# Patient Record
Sex: FEMALE | Race: WHITE | Employment: UNEMPLOYED | URBAN - METROPOLITAN AREA
[De-identification: names, ages, dates, MRNs, and addresses within clinical notes are randomized per-mention and may not be internally consistent; named-entity substitution may affect disease eponyms.]

---

## 2018-06-08 ENCOUNTER — HOSPITAL ENCOUNTER (EMERGENCY)
Age: 48
Discharge: HOME | End: 2018-06-08
Attending: EMERGENCY MEDICINE
Payer: MEDICAID

## 2018-06-08 ENCOUNTER — APPOINTMENT (OUTPATIENT)
Dept: CT IMAGING | Age: 48
End: 2018-06-08
Payer: MEDICAID

## 2018-06-08 VITALS
DIASTOLIC BLOOD PRESSURE: 78 MMHG | BODY MASS INDEX: 30.24 KG/M2 | SYSTOLIC BLOOD PRESSURE: 114 MMHG | HEART RATE: 76 BPM | TEMPERATURE: 97.3 F | WEIGHT: 150 LBS | RESPIRATION RATE: 18 BRPM | HEIGHT: 59 IN | OXYGEN SATURATION: 98 %

## 2018-06-08 DIAGNOSIS — A59.01 TRICHOMONAL VAGINITIS: Primary | ICD-10-CM

## 2018-06-08 DIAGNOSIS — R10.31 RIGHT LOWER QUADRANT ABDOMINAL PAIN: ICD-10-CM

## 2018-06-08 LAB
-: ABNORMAL
ALBUMIN SERPL-MCNC: 4.2 G/DL (ref 3.5–5.2)
ALBUMIN/GLOBULIN RATIO: 1.3 (ref 1–2.5)
ALP BLD-CCNC: 56 U/L (ref 35–104)
ALT SERPL-CCNC: 17 U/L (ref 5–33)
AMORPHOUS: ABNORMAL
ANION GAP SERPL CALCULATED.3IONS-SCNC: 12 MMOL/L (ref 9–17)
AST SERPL-CCNC: 14 U/L
BACTERIA: ABNORMAL
BILIRUB SERPL-MCNC: 0.33 MG/DL (ref 0.3–1.2)
BILIRUBIN DIRECT: 0.09 MG/DL
BILIRUBIN URINE: NEGATIVE
BILIRUBIN, INDIRECT: 0.24 MG/DL (ref 0–1)
BUN BLDV-MCNC: 13 MG/DL (ref 6–20)
BUN/CREAT BLD: NORMAL (ref 9–20)
CALCIUM SERPL-MCNC: 9.5 MG/DL (ref 8.6–10.4)
CASTS UA: ABNORMAL /LPF (ref 0–2)
CHLORIDE BLD-SCNC: 104 MMOL/L (ref 98–107)
CO2: 24 MMOL/L (ref 20–31)
COLOR: YELLOW
CREAT SERPL-MCNC: 0.75 MG/DL (ref 0.5–0.9)
CRYSTALS, UA: ABNORMAL /HPF
DIRECT EXAM: ABNORMAL
EPITHELIAL CELLS UA: ABNORMAL /HPF (ref 0–5)
GFR AFRICAN AMERICAN: >60 ML/MIN
GFR NON-AFRICAN AMERICAN: >60 ML/MIN
GFR SERPL CREATININE-BSD FRML MDRD: NORMAL ML/MIN/{1.73_M2}
GFR SERPL CREATININE-BSD FRML MDRD: NORMAL ML/MIN/{1.73_M2}
GLOBULIN: NORMAL G/DL (ref 1.5–3.8)
GLUCOSE BLD-MCNC: 92 MG/DL (ref 70–99)
GLUCOSE URINE: NEGATIVE
HCG QUALITATIVE: NEGATIVE
HCT VFR BLD CALC: 46.2 % (ref 36.3–47.1)
HEMOGLOBIN: 15.2 G/DL (ref 11.9–15.1)
KETONES, URINE: NEGATIVE
LEUKOCYTE ESTERASE, URINE: ABNORMAL
LIPASE: 20 U/L (ref 13–60)
Lab: ABNORMAL
MCH RBC QN AUTO: 29.1 PG (ref 25.2–33.5)
MCHC RBC AUTO-ENTMCNC: 32.9 G/DL (ref 28.4–34.8)
MCV RBC AUTO: 88.5 FL (ref 82.6–102.9)
MUCUS: ABNORMAL
NITRITE, URINE: NEGATIVE
NRBC AUTOMATED: 0 PER 100 WBC
OTHER OBSERVATIONS UA: ABNORMAL
PDW BLD-RTO: 12 % (ref 11.8–14.4)
PH UA: 6 (ref 5–8)
PLATELET # BLD: 300 K/UL (ref 138–453)
PMV BLD AUTO: 9.3 FL (ref 8.1–13.5)
POTASSIUM SERPL-SCNC: 4.5 MMOL/L (ref 3.7–5.3)
PROTEIN UA: NEGATIVE
RBC # BLD: 5.22 M/UL (ref 3.95–5.11)
RBC UA: ABNORMAL /HPF (ref 0–2)
RENAL EPITHELIAL, UA: ABNORMAL /HPF
SODIUM BLD-SCNC: 140 MMOL/L (ref 135–144)
SPECIFIC GRAVITY UA: 1.02 (ref 1–1.03)
SPECIMEN DESCRIPTION: ABNORMAL
STATUS: ABNORMAL
TOTAL PROTEIN: 7.4 G/DL (ref 6.4–8.3)
TRICHOMONAS: ABNORMAL
TURBIDITY: CLEAR
URINE HGB: ABNORMAL
UROBILINOGEN, URINE: NORMAL
WBC # BLD: 8 K/UL (ref 3.5–11.3)
WBC UA: ABNORMAL /HPF (ref 0–5)
YEAST: ABNORMAL

## 2018-06-08 PROCEDURE — 81001 URINALYSIS AUTO W/SCOPE: CPT

## 2018-06-08 PROCEDURE — 83690 ASSAY OF LIPASE: CPT

## 2018-06-08 PROCEDURE — 96376 TX/PRO/DX INJ SAME DRUG ADON: CPT

## 2018-06-08 PROCEDURE — 6360000002 HC RX W HCPCS: Performed by: EMERGENCY MEDICINE

## 2018-06-08 PROCEDURE — 87480 CANDIDA DNA DIR PROBE: CPT

## 2018-06-08 PROCEDURE — 96372 THER/PROPH/DIAG INJ SC/IM: CPT

## 2018-06-08 PROCEDURE — 84703 CHORIONIC GONADOTROPIN ASSAY: CPT

## 2018-06-08 PROCEDURE — 6360000004 HC RX CONTRAST MEDICATION: Performed by: EMERGENCY MEDICINE

## 2018-06-08 PROCEDURE — 2580000003 HC RX 258: Performed by: EMERGENCY MEDICINE

## 2018-06-08 PROCEDURE — 74177 CT ABD & PELVIS W/CONTRAST: CPT

## 2018-06-08 PROCEDURE — 87510 GARDNER VAG DNA DIR PROBE: CPT

## 2018-06-08 PROCEDURE — 80076 HEPATIC FUNCTION PANEL: CPT

## 2018-06-08 PROCEDURE — 96375 TX/PRO/DX INJ NEW DRUG ADDON: CPT

## 2018-06-08 PROCEDURE — 80048 BASIC METABOLIC PNL TOTAL CA: CPT

## 2018-06-08 PROCEDURE — 87660 TRICHOMONAS VAGIN DIR PROBE: CPT

## 2018-06-08 PROCEDURE — G0383 LEV 4 HOSP TYPE B ED VISIT: HCPCS

## 2018-06-08 PROCEDURE — 6370000000 HC RX 637 (ALT 250 FOR IP): Performed by: EMERGENCY MEDICINE

## 2018-06-08 PROCEDURE — 87491 CHLMYD TRACH DNA AMP PROBE: CPT

## 2018-06-08 PROCEDURE — 85027 COMPLETE CBC AUTOMATED: CPT

## 2018-06-08 PROCEDURE — 96374 THER/PROPH/DIAG INJ IV PUSH: CPT

## 2018-06-08 PROCEDURE — 87086 URINE CULTURE/COLONY COUNT: CPT

## 2018-06-08 PROCEDURE — 87591 N.GONORRHOEAE DNA AMP PROB: CPT

## 2018-06-08 RX ORDER — 0.9 % SODIUM CHLORIDE 0.9 %
1000 INTRAVENOUS SOLUTION INTRAVENOUS ONCE
Status: COMPLETED | OUTPATIENT
Start: 2018-06-08 | End: 2018-06-08

## 2018-06-08 RX ORDER — FENTANYL CITRATE 50 UG/ML
25 INJECTION, SOLUTION INTRAMUSCULAR; INTRAVENOUS ONCE
Status: COMPLETED | OUTPATIENT
Start: 2018-06-08 | End: 2018-06-08

## 2018-06-08 RX ORDER — METRONIDAZOLE 500 MG/1
2000 TABLET ORAL ONCE
Status: COMPLETED | OUTPATIENT
Start: 2018-06-08 | End: 2018-06-08

## 2018-06-08 RX ORDER — AZITHROMYCIN 250 MG/1
1000 TABLET, FILM COATED ORAL ONCE
Status: COMPLETED | OUTPATIENT
Start: 2018-06-08 | End: 2018-06-08

## 2018-06-08 RX ORDER — CEFTRIAXONE SODIUM 250 MG/1
250 INJECTION, POWDER, FOR SOLUTION INTRAMUSCULAR; INTRAVENOUS ONCE
Status: COMPLETED | OUTPATIENT
Start: 2018-06-08 | End: 2018-06-08

## 2018-06-08 RX ORDER — ONDANSETRON 2 MG/ML
4 INJECTION INTRAMUSCULAR; INTRAVENOUS ONCE
Status: COMPLETED | OUTPATIENT
Start: 2018-06-08 | End: 2018-06-08

## 2018-06-08 RX ADMIN — FENTANYL CITRATE 25 MCG: 50 INJECTION INTRAMUSCULAR; INTRAVENOUS at 13:07

## 2018-06-08 RX ADMIN — FENTANYL CITRATE 50 MCG: 50 INJECTION INTRAMUSCULAR; INTRAVENOUS at 10:36

## 2018-06-08 RX ADMIN — CEFTRIAXONE SODIUM 250 MG: 250 INJECTION, POWDER, FOR SOLUTION INTRAMUSCULAR; INTRAVENOUS at 13:06

## 2018-06-08 RX ADMIN — SODIUM CHLORIDE 1000 ML: 9 INJECTION, SOLUTION INTRAVENOUS at 10:36

## 2018-06-08 RX ADMIN — IOPAMIDOL 75 ML: 755 INJECTION, SOLUTION INTRAVENOUS at 12:38

## 2018-06-08 RX ADMIN — AZITHROMYCIN 1000 MG: 250 TABLET, FILM COATED ORAL at 13:06

## 2018-06-08 RX ADMIN — ONDANSETRON 4 MG: 2 INJECTION INTRAMUSCULAR; INTRAVENOUS at 10:36

## 2018-06-08 RX ADMIN — METRONIDAZOLE 2000 MG: 500 TABLET ORAL at 13:06

## 2018-06-08 ASSESSMENT — ENCOUNTER SYMPTOMS
BLOOD IN STOOL: 0
ABDOMINAL DISTENTION: 0
DIARRHEA: 0
SHORTNESS OF BREATH: 0
CONSTIPATION: 0
VOMITING: 0
NAUSEA: 1
ANAL BLEEDING: 0
ABDOMINAL PAIN: 1
BACK PAIN: 0

## 2018-06-08 ASSESSMENT — PAIN SCALES - GENERAL
PAINLEVEL_OUTOF10: 8
PAINLEVEL_OUTOF10: 9

## 2018-06-09 LAB
CULTURE: NORMAL
CULTURE: NORMAL
Lab: NORMAL
SPECIMEN DESCRIPTION: NORMAL
STATUS: NORMAL

## 2018-06-11 LAB
C TRACH DNA GENITAL QL NAA+PROBE: NEGATIVE
N. GONORRHOEAE DNA: NEGATIVE

## 2019-03-07 ENCOUNTER — HOSPITAL ENCOUNTER (EMERGENCY)
Age: 49
Discharge: HOME | End: 2019-03-07
Attending: EMERGENCY MEDICINE
Payer: COMMERCIAL

## 2019-03-07 VITALS
RESPIRATION RATE: 19 BRPM | HEART RATE: 94 BPM | TEMPERATURE: 97.2 F | SYSTOLIC BLOOD PRESSURE: 136 MMHG | DIASTOLIC BLOOD PRESSURE: 84 MMHG | OXYGEN SATURATION: 99 %

## 2019-03-07 DIAGNOSIS — M54.9 MUSCULOSKELETAL BACK PAIN: Primary | ICD-10-CM

## 2019-03-07 PROCEDURE — 99283 EMERGENCY DEPT VISIT LOW MDM: CPT

## 2019-03-07 PROCEDURE — 6370000000 HC RX 637 (ALT 250 FOR IP): Performed by: INTERNAL MEDICINE

## 2019-03-07 RX ORDER — CYCLOBENZAPRINE HCL 10 MG
5 TABLET ORAL ONCE
Status: COMPLETED | OUTPATIENT
Start: 2019-03-07 | End: 2019-03-07

## 2019-03-07 RX ORDER — CYCLOBENZAPRINE HCL 5 MG
5 TABLET ORAL NIGHTLY PRN
Qty: 10 TABLET | Refills: 0 | Status: SHIPPED | OUTPATIENT
Start: 2019-03-07 | End: 2019-03-17

## 2019-03-07 RX ADMIN — CYCLOBENZAPRINE HYDROCHLORIDE 5 MG: 10 TABLET, FILM COATED ORAL at 12:31

## 2019-03-07 ASSESSMENT — ENCOUNTER SYMPTOMS
DIARRHEA: 0
WHEEZING: 0
VOMITING: 0
VOICE CHANGE: 0
SORE THROAT: 0
ABDOMINAL PAIN: 0
SHORTNESS OF BREATH: 0
BACK PAIN: 1
NAUSEA: 0
CHEST TIGHTNESS: 0
COUGH: 1
CONSTIPATION: 0

## 2019-03-07 ASSESSMENT — PAIN DESCRIPTION - LOCATION: LOCATION: BACK

## 2019-03-07 ASSESSMENT — PAIN DESCRIPTION - DESCRIPTORS: DESCRIPTORS: SHOOTING

## 2019-03-07 ASSESSMENT — PAIN SCALES - GENERAL: PAINLEVEL_OUTOF10: 8

## 2019-03-07 ASSESSMENT — PAIN DESCRIPTION - PAIN TYPE: TYPE: ACUTE PAIN

## 2019-03-19 ENCOUNTER — APPOINTMENT (OUTPATIENT)
Dept: MRI IMAGING | Age: 49
End: 2019-03-19
Payer: COMMERCIAL

## 2019-03-19 ENCOUNTER — HOSPITAL ENCOUNTER (OUTPATIENT)
Age: 49
Setting detail: OBSERVATION
Discharge: HOME | End: 2019-03-20
Attending: EMERGENCY MEDICINE | Admitting: EMERGENCY MEDICINE
Payer: COMMERCIAL

## 2019-03-19 DIAGNOSIS — M48.02 CERVICAL SPINAL STENOSIS: Primary | ICD-10-CM

## 2019-03-19 DIAGNOSIS — R29.898 WEAKNESS OF LEFT FOOT: ICD-10-CM

## 2019-03-19 PROBLEM — M48.00 SPINAL STENOSIS: Status: ACTIVE | Noted: 2019-03-19

## 2019-03-19 PROCEDURE — 6360000004 HC RX CONTRAST MEDICATION: Performed by: STUDENT IN AN ORGANIZED HEALTH CARE EDUCATION/TRAINING PROGRAM

## 2019-03-19 PROCEDURE — 72157 MRI CHEST SPINE W/O & W/DYE: CPT

## 2019-03-19 PROCEDURE — 6370000000 HC RX 637 (ALT 250 FOR IP): Performed by: STUDENT IN AN ORGANIZED HEALTH CARE EDUCATION/TRAINING PROGRAM

## 2019-03-19 PROCEDURE — 72158 MRI LUMBAR SPINE W/O & W/DYE: CPT

## 2019-03-19 PROCEDURE — 99285 EMERGENCY DEPT VISIT HI MDM: CPT

## 2019-03-19 PROCEDURE — 96372 THER/PROPH/DIAG INJ SC/IM: CPT

## 2019-03-19 PROCEDURE — 72141 MRI NECK SPINE W/O DYE: CPT

## 2019-03-19 PROCEDURE — 6360000002 HC RX W HCPCS: Performed by: EMERGENCY MEDICINE

## 2019-03-19 PROCEDURE — G0378 HOSPITAL OBSERVATION PER HR: HCPCS

## 2019-03-19 PROCEDURE — A9576 INJ PROHANCE MULTIPACK: HCPCS | Performed by: STUDENT IN AN ORGANIZED HEALTH CARE EDUCATION/TRAINING PROGRAM

## 2019-03-19 RX ORDER — OXYCODONE HYDROCHLORIDE AND ACETAMINOPHEN 5; 325 MG/1; MG/1
1 TABLET ORAL ONCE
Status: COMPLETED | OUTPATIENT
Start: 2019-03-19 | End: 2019-03-19

## 2019-03-19 RX ORDER — SODIUM CHLORIDE 0.9 % (FLUSH) 0.9 %
10 SYRINGE (ML) INJECTION 2 TIMES DAILY
Status: DISCONTINUED | OUTPATIENT
Start: 2019-03-19 | End: 2019-03-20 | Stop reason: HOSPADM

## 2019-03-19 RX ORDER — ACETAMINOPHEN 500 MG
1000 TABLET ORAL EVERY 6 HOURS PRN
Status: DISCONTINUED | OUTPATIENT
Start: 2019-03-19 | End: 2019-03-20 | Stop reason: HOSPADM

## 2019-03-19 RX ORDER — CYCLOBENZAPRINE HCL 10 MG
10 TABLET ORAL 3 TIMES DAILY PRN
COMMUNITY
End: 2019-03-25 | Stop reason: ALTCHOICE

## 2019-03-19 RX ORDER — MORPHINE SULFATE 4 MG/ML
4 INJECTION, SOLUTION INTRAMUSCULAR; INTRAVENOUS ONCE
Status: COMPLETED | OUTPATIENT
Start: 2019-03-19 | End: 2019-03-19

## 2019-03-19 RX ADMIN — ACETAMINOPHEN 1000 MG: 500 TABLET ORAL at 21:44

## 2019-03-19 RX ADMIN — MORPHINE SULFATE 4 MG: 4 INJECTION INTRAVENOUS at 14:06

## 2019-03-19 RX ADMIN — GADOTERIDOL 14 ML: 279.3 INJECTION, SOLUTION INTRAVENOUS at 19:58

## 2019-03-19 RX ADMIN — OXYCODONE HYDROCHLORIDE AND ACETAMINOPHEN 1 TABLET: 5; 325 TABLET ORAL at 17:02

## 2019-03-19 ASSESSMENT — PAIN DESCRIPTION - PROGRESSION: CLINICAL_PROGRESSION: GRADUALLY IMPROVING

## 2019-03-19 ASSESSMENT — PAIN DESCRIPTION - PAIN TYPE: TYPE: ACUTE PAIN

## 2019-03-19 ASSESSMENT — PAIN DESCRIPTION - DESCRIPTORS: DESCRIPTORS: CONSTANT

## 2019-03-19 ASSESSMENT — PAIN SCALES - GENERAL
PAINLEVEL_OUTOF10: 5
PAINLEVEL_OUTOF10: 8
PAINLEVEL_OUTOF10: 8
PAINLEVEL_OUTOF10: 9

## 2019-03-19 ASSESSMENT — PAIN DESCRIPTION - ORIENTATION: ORIENTATION: LEFT;UPPER

## 2019-03-20 VITALS
DIASTOLIC BLOOD PRESSURE: 59 MMHG | TEMPERATURE: 98.1 F | BODY MASS INDEX: 23.39 KG/M2 | HEIGHT: 59 IN | WEIGHT: 116 LBS | HEART RATE: 75 BPM | SYSTOLIC BLOOD PRESSURE: 96 MMHG | RESPIRATION RATE: 18 BRPM | OXYGEN SATURATION: 95 %

## 2019-03-20 PROBLEM — M48.02 CERVICAL SPINAL STENOSIS: Status: ACTIVE | Noted: 2019-03-19

## 2019-03-20 PROCEDURE — APPSS45 APP SPLIT SHARED TIME 31-45 MINUTES: Performed by: NURSE PRACTITIONER

## 2019-03-20 PROCEDURE — 6370000000 HC RX 637 (ALT 250 FOR IP): Performed by: STUDENT IN AN ORGANIZED HEALTH CARE EDUCATION/TRAINING PROGRAM

## 2019-03-20 PROCEDURE — 96372 THER/PROPH/DIAG INJ SC/IM: CPT

## 2019-03-20 PROCEDURE — 6370000000 HC RX 637 (ALT 250 FOR IP): Performed by: EMERGENCY MEDICINE

## 2019-03-20 PROCEDURE — 96374 THER/PROPH/DIAG INJ IV PUSH: CPT

## 2019-03-20 PROCEDURE — 2580000003 HC RX 258: Performed by: EMERGENCY MEDICINE

## 2019-03-20 PROCEDURE — G0378 HOSPITAL OBSERVATION PER HR: HCPCS

## 2019-03-20 PROCEDURE — 6360000002 HC RX W HCPCS: Performed by: PSYCHIATRY & NEUROLOGY

## 2019-03-20 PROCEDURE — 99219 PR INITIAL OBSERVATION CARE/DAY 50 MINUTES: CPT | Performed by: PSYCHIATRY & NEUROLOGY

## 2019-03-20 PROCEDURE — 6360000002 HC RX W HCPCS: Performed by: EMERGENCY MEDICINE

## 2019-03-20 RX ORDER — CYCLOBENZAPRINE HCL 10 MG
10 TABLET ORAL 3 TIMES DAILY PRN
Status: DISCONTINUED | OUTPATIENT
Start: 2019-03-20 | End: 2019-03-20 | Stop reason: HOSPADM

## 2019-03-20 RX ORDER — METHYLPREDNISOLONE SODIUM SUCCINATE 40 MG/ML
40 INJECTION, POWDER, LYOPHILIZED, FOR SOLUTION INTRAMUSCULAR; INTRAVENOUS DAILY
Status: COMPLETED | OUTPATIENT
Start: 2019-03-20 | End: 2019-03-20

## 2019-03-20 RX ORDER — SODIUM CHLORIDE 9 MG/ML
INJECTION, SOLUTION INTRAVENOUS CONTINUOUS
Status: DISCONTINUED | OUTPATIENT
Start: 2019-03-20 | End: 2019-03-20 | Stop reason: HOSPADM

## 2019-03-20 RX ORDER — METHYLPREDNISOLONE 4 MG/1
TABLET ORAL
Qty: 1 KIT | Refills: 0 | Status: SHIPPED | OUTPATIENT
Start: 2019-03-20 | End: 2019-03-26

## 2019-03-20 RX ORDER — SODIUM CHLORIDE 0.9 % (FLUSH) 0.9 %
10 SYRINGE (ML) INJECTION EVERY 12 HOURS SCHEDULED
Status: DISCONTINUED | OUTPATIENT
Start: 2019-03-20 | End: 2019-03-20 | Stop reason: HOSPADM

## 2019-03-20 RX ORDER — SODIUM CHLORIDE 0.9 % (FLUSH) 0.9 %
10 SYRINGE (ML) INJECTION PRN
Status: DISCONTINUED | OUTPATIENT
Start: 2019-03-20 | End: 2019-03-20 | Stop reason: HOSPADM

## 2019-03-20 RX ORDER — ACETAMINOPHEN 325 MG/1
650 TABLET ORAL EVERY 4 HOURS PRN
Status: DISCONTINUED | OUTPATIENT
Start: 2019-03-20 | End: 2019-03-20 | Stop reason: SDUPTHER

## 2019-03-20 RX ADMIN — CYCLOBENZAPRINE 10 MG: 10 TABLET, FILM COATED ORAL at 08:00

## 2019-03-20 RX ADMIN — ENOXAPARIN SODIUM 40 MG: 40 INJECTION SUBCUTANEOUS at 08:00

## 2019-03-20 RX ADMIN — METHYLPREDNISOLONE SODIUM SUCCINATE 40 MG: 40 INJECTION, POWDER, FOR SOLUTION INTRAMUSCULAR; INTRAVENOUS at 15:04

## 2019-03-20 RX ADMIN — SODIUM CHLORIDE: 9 INJECTION, SOLUTION INTRAVENOUS at 01:21

## 2019-03-20 RX ADMIN — ACETAMINOPHEN 1000 MG: 500 TABLET ORAL at 08:00

## 2019-03-20 ASSESSMENT — PAIN DESCRIPTION - ORIENTATION: ORIENTATION: MID

## 2019-03-20 ASSESSMENT — PAIN DESCRIPTION - PAIN TYPE: TYPE: ACUTE PAIN

## 2019-03-20 ASSESSMENT — PAIN DESCRIPTION - LOCATION: LOCATION: HEAD;NECK

## 2019-03-20 ASSESSMENT — PAIN SCALES - GENERAL
PAINLEVEL_OUTOF10: 8
PAINLEVEL_OUTOF10: 8

## 2019-03-25 ENCOUNTER — HOSPITAL ENCOUNTER (OUTPATIENT)
Age: 49
Setting detail: SPECIMEN
Discharge: HOME | End: 2019-03-25
Payer: COMMERCIAL

## 2019-03-25 ENCOUNTER — OFFICE VISIT (OUTPATIENT)
Dept: FAMILY MEDICINE CLINIC | Age: 49
End: 2019-03-25
Payer: COMMERCIAL

## 2019-03-25 VITALS
TEMPERATURE: 97.7 F | OXYGEN SATURATION: 98 % | HEIGHT: 59 IN | HEART RATE: 76 BPM | WEIGHT: 151.4 LBS | DIASTOLIC BLOOD PRESSURE: 70 MMHG | BODY MASS INDEX: 30.52 KG/M2 | SYSTOLIC BLOOD PRESSURE: 104 MMHG

## 2019-03-25 DIAGNOSIS — Z13.220 SCREENING FOR HYPERLIPIDEMIA: ICD-10-CM

## 2019-03-25 DIAGNOSIS — Z76.89 ENCOUNTER TO ESTABLISH CARE: ICD-10-CM

## 2019-03-25 DIAGNOSIS — R91.1 PULMONARY NODULE: ICD-10-CM

## 2019-03-25 DIAGNOSIS — F17.210 CIGARETTE NICOTINE DEPENDENCE WITHOUT COMPLICATION: ICD-10-CM

## 2019-03-25 DIAGNOSIS — R20.2 NUMBNESS AND TINGLING IN BOTH HANDS: ICD-10-CM

## 2019-03-25 DIAGNOSIS — Z79.899 MEDICATION MANAGEMENT: ICD-10-CM

## 2019-03-25 DIAGNOSIS — R20.0 NUMBNESS AND TINGLING IN BOTH HANDS: ICD-10-CM

## 2019-03-25 DIAGNOSIS — M54.2 NECK PAIN: Primary | ICD-10-CM

## 2019-03-25 DIAGNOSIS — Z23 NEED FOR PNEUMOCOCCAL VACCINATION: ICD-10-CM

## 2019-03-25 LAB
ALBUMIN SERPL-MCNC: 4.6 G/DL (ref 3.5–5.2)
ALBUMIN/GLOBULIN RATIO: 1.7 (ref 1–2.5)
ALP BLD-CCNC: 52 U/L (ref 35–104)
ALT SERPL-CCNC: 14 U/L (ref 5–33)
ANION GAP SERPL CALCULATED.3IONS-SCNC: 19 MMOL/L (ref 9–17)
AST SERPL-CCNC: 13 U/L
BILIRUB SERPL-MCNC: 0.43 MG/DL (ref 0.3–1.2)
BUN BLDV-MCNC: 19 MG/DL (ref 6–20)
BUN/CREAT BLD: ABNORMAL (ref 9–20)
CALCIUM SERPL-MCNC: 9.1 MG/DL (ref 8.6–10.4)
CHLORIDE BLD-SCNC: 104 MMOL/L (ref 98–107)
CHOLESTEROL/HDL RATIO: 4.2
CHOLESTEROL: 180 MG/DL
CO2: 21 MMOL/L (ref 20–31)
CREAT SERPL-MCNC: 0.74 MG/DL (ref 0.5–0.9)
GFR AFRICAN AMERICAN: >60 ML/MIN
GFR NON-AFRICAN AMERICAN: >60 ML/MIN
GFR SERPL CREATININE-BSD FRML MDRD: ABNORMAL ML/MIN/{1.73_M2}
GFR SERPL CREATININE-BSD FRML MDRD: ABNORMAL ML/MIN/{1.73_M2}
GLUCOSE BLD-MCNC: 83 MG/DL (ref 70–99)
HCT VFR BLD CALC: 45.5 % (ref 36.3–47.1)
HDLC SERPL-MCNC: 43 MG/DL
HEMOGLOBIN: 15.4 G/DL (ref 11.9–15.1)
LDL CHOLESTEROL: 110 MG/DL (ref 0–130)
MCH RBC QN AUTO: 30.2 PG (ref 25.2–33.5)
MCHC RBC AUTO-ENTMCNC: 33.8 G/DL (ref 28.4–34.8)
MCV RBC AUTO: 89.2 FL (ref 82.6–102.9)
NRBC AUTOMATED: 0 PER 100 WBC
PDW BLD-RTO: 11.7 % (ref 11.8–14.4)
PLATELET # BLD: 316 K/UL (ref 138–453)
PMV BLD AUTO: 9.8 FL (ref 8.1–13.5)
POTASSIUM SERPL-SCNC: 4.2 MMOL/L (ref 3.7–5.3)
RBC # BLD: 5.1 M/UL (ref 3.95–5.11)
SODIUM BLD-SCNC: 144 MMOL/L (ref 135–144)
TOTAL PROTEIN: 7.3 G/DL (ref 6.4–8.3)
TRIGL SERPL-MCNC: 135 MG/DL
VLDLC SERPL CALC-MCNC: NORMAL MG/DL (ref 1–30)
WBC # BLD: 12 K/UL (ref 3.5–11.3)

## 2019-03-25 PROCEDURE — 4004F PT TOBACCO SCREEN RCVD TLK: CPT | Performed by: NURSE PRACTITIONER

## 2019-03-25 PROCEDURE — 90732 PPSV23 VACC 2 YRS+ SUBQ/IM: CPT | Performed by: NURSE PRACTITIONER

## 2019-03-25 PROCEDURE — G8427 DOCREV CUR MEDS BY ELIG CLIN: HCPCS | Performed by: NURSE PRACTITIONER

## 2019-03-25 PROCEDURE — 90471 IMMUNIZATION ADMIN: CPT | Performed by: NURSE PRACTITIONER

## 2019-03-25 PROCEDURE — G8417 CALC BMI ABV UP PARAM F/U: HCPCS | Performed by: NURSE PRACTITIONER

## 2019-03-25 PROCEDURE — G8484 FLU IMMUNIZE NO ADMIN: HCPCS | Performed by: NURSE PRACTITIONER

## 2019-03-25 PROCEDURE — 99203 OFFICE O/P NEW LOW 30 MIN: CPT | Performed by: NURSE PRACTITIONER

## 2019-03-25 ASSESSMENT — ENCOUNTER SYMPTOMS
COUGH: 0
RESPIRATORY NEGATIVE: 1

## 2021-10-26 ENCOUNTER — HOSPITAL ENCOUNTER (OUTPATIENT)
Age: 51
Setting detail: SPECIMEN
Discharge: HOME | End: 2021-10-26

## 2021-10-27 LAB
CHLAMYDIA BY THIN PREP: NEGATIVE
N. GONORRHOEAE DNA, THIN PREP: NEGATIVE
SPECIMEN DESCRIPTION: NORMAL

## 2021-10-28 LAB
HPV SAMPLE: NORMAL
HPV, GENOTYPE 16: NOT DETECTED
HPV, GENOTYPE 18: NOT DETECTED
HPV, HIGH RISK OTHER: NOT DETECTED
HPV, INTERPRETATION: NORMAL
SPECIMEN DESCRIPTION: NORMAL

## 2021-11-03 LAB — CYTOLOGY REPORT: NORMAL

## 2021-11-23 ENCOUNTER — HOSPITAL ENCOUNTER (OUTPATIENT)
Dept: ULTRASOUND IMAGING | Age: 51
Discharge: HOME | End: 2021-11-23
Payer: MEDICAID

## 2021-11-23 DIAGNOSIS — K42.9 UMBILICAL HERNIA WITHOUT OBSTRUCTION OR GANGRENE: ICD-10-CM

## 2021-11-23 PROCEDURE — 76705 ECHO EXAM OF ABDOMEN: CPT

## 2022-04-20 ENCOUNTER — HOSPITAL ENCOUNTER (OUTPATIENT)
Dept: PHYSICAL THERAPY | Age: 52
Setting detail: THERAPIES SERIES
Discharge: HOME | End: 2022-04-20
Payer: MEDICAID

## 2022-04-20 PROCEDURE — 97163 PT EVAL HIGH COMPLEX 45 MIN: CPT

## 2022-04-20 PROCEDURE — 97110 THERAPEUTIC EXERCISES: CPT

## 2022-04-20 NOTE — PROGRESS NOTES
** PLEASE SIGN, DATE AND TIME CERTIFICATION BELOW AND RETURN TO [facility] OUTPATIENT REHABILITATION (FAX #: [phone]). ATTEST/CO-SIGN IF ACCESSING VIA [employer]. THANK YOU.**    I certify that I have examined the patient below and determined that Physical Medicine and Rehabilitation service is necessary and that I approve the established plan of care for up to 90 days or as specifically noted. Attestation, signature or co-signature of physician indicates approval of certification requirements.    ________________________ ____________ __________  Physician Signature   Date   Time  [address] [facility]  PHYSICAL THERAPY  [x] EVALUATION  [] DAILY NOTE (LAND) [] DAILY NOTE (AQUATIC ) [] PROGRESS NOTE [] DISCHARGE NOTE    [x] [address] [facility]   [] [place] [address]    [] [address] [facility]   [] Criselda Steven    Date: 2022  Patient Name:  Phoebe Mejía  : 1970  MRN: [id number]  CSN: [id number]    Referring Practitioner Radha Bacon MD   Diagnosis Other intervertebral disc degeneration, lumbosacral region [M51.37]  Spondylosis without myelopathy or radiculopathy, lumbosacral region [M47.817]  Other cervical disc degeneration, high cervical region [M50.31]  Other cervical disc degeneration at C4-C5 level [M50.321]  Other cervical disc degeneration at C5-C6 level [M50.322]    Treatment Diagnosis Pain in low back, pain in neck, decreased trunk ROM, decreased cervical ROM, weakness in core, weakness in hips, poor posture, abnormal gait    Date of Evaluation 22    Additional Pertinent History Arthritis, SOB       Functional Outcome Measure Used Modified Oswestry    Functional Outcome Score 1850 (22)       Insurance: Primary: Payor: [employer] /  /  / ,   Secondary:    Authorization Information: INSURANCE THERAPY BENEFIT: No precert until after 21ZE visit.   Visit Limit Based on Precert  AQUATIC THERAPY COVERED: Yes  MODALITIES COVERED:  Yes except for Iontophoresis and Hot/Cold Packs. TELEHEALTH COVERED: Yes   Visit # 1, 1/10 for progress note   Visits Allowed: 30 visits    Recertification Date: 5/65/86   Physician Follow-Up:    Physician Orders:    History of Present Illness: Patient reports that she has had neck and back pain since 2019. Patient reports that she did not have any specific injury. Patient reports that she does have numbness/tingling in her arms that goes into her hands. Patient reports that she does have numbness/tingling in L leg also that goes to her feet/toes. Patient reports that her neck pain radiates in her shoulder and her back pain radiates into her hips. Patient reports pain with walking, standing, bending, carrying. Patient reports that she did have x-ray done recently. SUBJECTIVE: Previous MRI showed: Mild bilateral degenerative neural foraminal stenosis at L4-5. Degenerative disc disease with associated degenerative uncovertebral and facet hypertrophy most pronounced at the C2-3, C3-4, and C4-5 levels. Moderate right foraminal narrowing at C4-5. Patient reports that laying down and massage and heat help her pain. Social/Functional History and Current Status:  Medications and Allergies have been reviewed and are listed on Medical History Questionnaire. Fabi Kerr lives with significant other in a multiple floor home with stairs and a handrail to enter.     Task Previous Current   ADLs  Independent Modified Independent   IADL's Independent Modified Independent   Ambulation Independent Modified Independent   Transfers Independent Modified Independent   Recreation Independent Dependent/Unable walking   Community Integration Independent Independent   Driving Does not drive Does not drive   Work Unemployed  Unemployed       Objective:    GENERAL   Pain 3/10 pain in neck and back; 10/10 pain in neck and back at most in the past week    Palpation Tenderness over suboccipitals, B upper trap, B levator scap, lumbar spinous process, B PSIS, B piriformis L > R. Sensation Light touch intact B UEs, decreased light touch L LE from knee to ankle/feet. Observation    Edema No edema noted    Accessory Motion Normal up and down glides        POSTURE     Comments   Forward Head x    Rounded Shoulders x    Kyphosis     Lordosis     Lateral Shift     Scoliosis         NECK RANGE OF MOTION   Flexion 26   Extension 36   Rotation Right 65   Rotation Left 55   Sidebending Right 29   Sidebending Left 21   Retraction Restricted, pain on R side    Protraction WFL   [facility]  []     UPPER EXTREMITY RANGE OF MOTION    Left Right Comments   Shoulder Flexion      Shoulder Extension      Shoulder Abduction      Shoulder Adduction      Shoulder External Rotation      Shoulder Internal Rotation      Shoulder Range of Motion is [facility]  [x]      Elbow Flexion      Elbow Extension      Elbow Range of Motion is [facility]  [x]     UPPER EXTREMITY STRENGTH    Left Right Comments   Shoulder Flexion 5 5    Shoulder Extension 5 5    Shoulder Abduction 5 5    Shoulder Adduction 5 5    Shoulder External Rotation 5 5    Shoulder Internal Rotation 5 5    []  Shoulder Strength is grossly WFL. Elbow Flexion 5 5    Elbow Extension 5 5    [] Elbow Strength is grossly WFL.  Strength L weaker          SPECIAL TESTS (+/-)    Left Right Comments   Cerv. Compression + +    Cerv. Distraction - - Symptoms the same or worse with distraction    Vertebral Artery - -    Spurling's + +        OBSERVATION   Bed Mobility Mod I    Transfers Mod I    Ambulation Patient ambulates with decreased quirino, decreased step length and height, decreased trunk rotation, no AD.        POSTURE    No Deficit Deficit Comments   Forward Head  x    Rounded Shoulders  x    Kyphosis x     Lordosis x     Lateral Shift x     Scoliosis x     Iliac Crest x     PSIS x     ASIS x     Leg Length Discrp x     Slumped Sitting  x        TRUNK RANGE OF MOTION   Flexion: WFL, pain   Extension: WFL, pain    Lateral Flexion Left: 25% limited   Lateral Flexion Right: 25% limited   Rotation Left: 25% limited   Rotation Right: 25% limited    Trunk Range of Motion is [facility]  []     LOWER EXTREMITY RANGE OF MOTION    Left Right Comments   Hip Flexion knee to chest      Hip Extension      Hip ABDuction      Hip ADDuction      Hip Internal Rotation      Hip External Rotation      Hip Range of Motion is [facility]  [x] except IR and ER ROM      Knee Flexion      Knee Extension      Knee Range of Motion is [facility]  [x]       LOWER EXTREMITY STRENGTH    Left Right Comments   Hip Flexion 4- 4    Hip Abduction 4 4+    Hip Extension 4 4+    Knee Flexion 5 5    Knee Extension 5 5    Ankle DF 5 5    Ankle PF 5 5    LE strength is WFL []      CORE STRENGTH   B SLR TEST:     15 seconds demonstrating decreased core strength       SPECIAL TESTS (+/-)    Left Right Comments   SLR  - -    90/90 Hamstring length 35 deg (+) 28 deg (+)    SKTC + + pain   Slump + -    ERICA + +    FADIR + +    Ela - -    Iron + +    Ely - -          TREATMENT   Precautions:    Pain: 3/10 pain in neck and back     X in shaded column indicates activity completed today   Modalities Parameters/  Location  Notes                     Manual Therapy Time/Technique  Notes                     Exercise/Intervention   Notes   BACK    Patient educated on the following for HEP. Handout provided. Ab brace    x    Piriformis stretch into IR and ER    x    Modified cross body LE stretch    x    HS stretch   x    LTR   x    NECK       Upper trap/levator scap stretch   x    Chin tuck   x    scap retraction    x             Specific Interventions Next Treatment: NECK: upper trap/levator scap/pec stretch, chin tucks, scapular retraction, posture/cervical stability exercises; BACK: core strength, piriformis/HS stretching/hip flexor stretch, hip IR and ER ROM, modalities as needed.      Activity/Treatment Tolerance:  [x]  Patient tolerated treatment well  []  Patient limited by fatigue  []  Patient limited by pain   []  Patient limited by medical complications  []  Other:     Assessment: 46year old female presents with neck and back pain secondary to disc degeneration. Patient has pain in neck, pain in back, decreased trunk ROM, decreased cervical ROM, weakness in core, weakness in hips, gait deviations, tightness throughout LEs and neck and poor posture that limits her ability to perform daily tasks. Patient would benefit from skilled PT to improve pain, ROM, tissue extensibility, strength, gait and posture to allow improved functional mobility. Patient educated on benefit of PT and PT POC with patient in agreement. Body Structures/Functions/Activity Limitations: impaired activity tolerance, impaired ROM, impaired strength, pain, abnormal gait and abnormal posture  Prognosis: good      Goals    Patient Goal: improve pain     Short Term Goals: 4 weeks  1. Patient will report decrease in pain to 5/10 at most to allow ease of ADLs and household tasks. 2. Patient will improve B cervical lateral flexion AROM to 35 degrees to allow decreased pain with household tasks. 3. Patient will improve B cervical rotation AROM to 70 degrees to allow decreased pain with turning head during driving. 4. Patient will improve trunk AROM to [facility] to allow ease of bending and lifting tasks. 5. Patient will improve B hip strength to 5/5 to allow ease of walking and daily tasks. 6. Patient will perform B SLR for 25 seconds to improve core strength needed for ease of standing tasks. 7. Patient will improve 90/90 hamstring length to 20 degrees B to allow decreased pain with standing/walking. Long Term Goals: 8 weeks  1. Patient will improve Modified Oswestry score from 18/50 to 9/50 to allow decrease in disability and improved functional mobility.   2. Patient will be independent with HEP in order to prevent re-injury and improve functional abilities. Patient Education:   [x]  HEP/Education Completed: Plan of Care, Goals, benefit of PT, HEP handout    [employer] Access Code: [email or web address]  []  No new Education completed  []  Reviewed Prior HEP      [x]  Patient verbalized and/or demonstrated understanding of education provided. []  Patient unable to verbalize and/or demonstrate understanding of education provided. Will continue education. []  Barriers to learning:     PLAN:  Treatment Recommendations: Strengthening, Range of Motion, Gait Training, Stair Training, Neuromuscular Re-education, Manual Therapy - Soft Tissue Mobilization, Manual Therapy - Joint Manipulation, Pain Management, Home Exercise Program, Patient Education, Integrative Dry Needling, Aquatics and Modalities    [x]  Plan of care initiated. Plan to see patient 2 times per week for 8 weeks to address the treatment planned outlined above.   []  Continue with current plan of care  []  Modify plan of care as follows:    []  Hold pending physician visit  []  Discharge    Time In 1305   Time Out 1350   Timed Code Minutes: 8 min   Total Treatment Time: 45 min     Electronically Signed by: Ashley Tobar, PT

## 2022-04-25 ENCOUNTER — HOSPITAL ENCOUNTER (OUTPATIENT)
Dept: PHYSICAL THERAPY | Age: 52
Setting detail: THERAPIES SERIES
Discharge: HOME | End: 2022-04-25
Payer: MEDICAID

## 2022-04-25 PROCEDURE — 97110 THERAPEUTIC EXERCISES: CPT

## 2022-04-25 NOTE — PROGRESS NOTES
[place]  PHYSICAL THERAPY  [] EVALUATION  [x] DAILY NOTE (LAND) [] DAILY NOTE (AQUATIC ) [] PROGRESS NOTE [] DISCHARGE NOTE    [x] OUTPATIENT REHABILITATION [facility]   [] [place] [address]    [] [facility]   [] Laura Carrera    Date: 2022  Patient Name:  Jay Jay Carolina  : 1970  MRN: [id number]  CSN: [id number]    Referring Practitioner Shayne Epps MD   Diagnosis Other intervertebral disc degeneration, lumbosacral region [M51.37]  Spondylosis without myelopathy or radiculopathy, lumbosacral region [M47.817]  Other cervical disc degeneration, high cervical region [M50.31]  Other cervical disc degeneration at C4-C5 level [M50.321]  Other cervical disc degeneration at C5-C6 level [M50.322]    Treatment Diagnosis Pain in low back, pain in neck, decreased trunk ROM, decreased cervical ROM, weakness in core, weakness in hips, poor posture, abnormal gait    Date of Evaluation 22    Additional Pertinent History Arthritis, SOB       Functional Outcome Measure Used Modified Oswestry    Functional Outcome Score  (22)       Insurance: Primary: Payor: [employer] /  /  / ,   Secondary:    Authorization Information: INSURANCE THERAPY BENEFIT: No precert until after 74XZ visit. Visit Limit Based on Precert  AQUATIC THERAPY COVERED:   Yes  MODALITIES COVERED:  Yes except for Iontophoresis and Hot/Cold Packs. TELEHEALTH COVERED: Yes   Visit # 2, 2/10 for progress note   Visits Allowed: 30 visits    Recertification Date:    Physician Follow-Up:    Physician Orders:    History of Present Illness: Patient reports that she has had neck and back pain since 2019. Patient reports that she did not have any specific injury. Patient reports that she does have numbness/tingling in her arms that goes into her hands. Patient reports that she does have numbness/tingling in L leg also that goes to her feet/toes.  Patient reports that her neck pain radiates in her shoulder and her back pain radiates into her hips. Patient reports pain with walking, standing, bending, carrying. Patient reports that she did have x-ray done recently. SUBJECTIVE: Patient states she is doing home exercises twice a day and does feel like it is helping. States she is more painful and sore today but she feels like the pain is related to the weather. TREATMENT   Precautions:    Pain: 4/10 Neck (R>L) and Back (L>R)     X in shaded column indicates activity completed today   Modalities Parameters/  Location  Notes                     Manual Therapy Time/Technique  Notes                     Exercise/Intervention   Notes   BACK       Ab brace  10x 5 sec  X    Ab brace with marching 10x  X    Piriformis stretch into IR and ER  3x 20 sec  X    Modified cross body LE stretch  3x 20 sec  X    HS stretch 3x 20 sec  X    LTR 5x 10 sec  X           NECK       Upper trap/levator scap stretch 3x 20 sec  X    Chin tuck 10x 3 sec  X    Scap retraction  10x 5 sec  X    Posterior shoulder rolls 10x  X           Educated on pillow support and sleeping positions   X             Specific Interventions Next Treatment: NECK: upper trap/levator scap/pec stretch, chin tucks, scapular retraction, posture/cervical stability exercises; BACK: core strength, piriformis/HS stretching/hip flexor stretch, hip IR and ER ROM, modalities as needed. Activity/Treatment Tolerance:  [x]  Patient tolerated treatment well []  Patient limited by fatigue  []  Patient limited by pain   []  Patient limited by medical complications  []  Other:     Assessment: Review of HEP exercises today with patient tolerating fairly well. Cued patient for abdominal bracing technique and correct breathing. No change noted in pain levels with pain rated 4/10 at completion of therapy session. Patient needed occasional cues for gentle stretching.      Body Structures/Functions/Activity Limitations: impaired activity tolerance, impaired ROM, impaired strength, pain, abnormal gait and abnormal posture  Prognosis: good      Goals    Patient Goal: improve pain     Short Term Goals: 4 weeks  1. Patient will report decrease in pain to 5/10 at most to allow ease of ADLs and household tasks. 2. Patient will improve B cervical lateral flexion AROM to 35 degrees to allow decreased pain with household tasks. 3. Patient will improve B cervical rotation AROM to 70 degrees to allow decreased pain with turning head during driving. 4. Patient will improve trunk AROM to [facility] to allow ease of bending and lifting tasks. 5. Patient will improve B hip strength to 5/5 to allow ease of walking and daily tasks. 6. Patient will perform B SLR for 25 seconds to improve core strength needed for ease of standing tasks. 7. Patient will improve 90/90 hamstring length to 20 degrees B to allow decreased pain with standing/walking. Long Term Goals: 8 weeks  1. Patient will improve Modified Oswestry score from 18/50 to 9/50 to allow decrease in disability and improved functional mobility. 2. Patient will be independent with HEP in order to prevent re-injury and improve functional abilities. Patient Education:   [x]  HEP/Education Completed: Reviewed HEP, monitor pain, use of heat, Abdominal bracing with daily activities   [employer] Access Code: [email or web address]  []  No new Education completed  []  Reviewed Prior HEP      [x]  Patient verbalized and/or demonstrated understanding of education provided. []  Patient unable to verbalize and/or demonstrate understanding of education provided. Will continue education.   []  Barriers to learning:     PLAN:  Treatment Recommendations: Strengthening, Range of Motion, Gait Training, Stair Training, Neuromuscular Re-education, Manual Therapy - Soft Tissue Mobilization, Manual Therapy - Joint Manipulation, Pain Management, Home Exercise Program, Patient Education, Integrative Dry Needling, Aquatics and Modalities    []  Plan of care initiated. Plan to see patient 2 times per week for 8 weeks to address the treatment planned outlined above.   [x]  Continue with current plan of care  []  Modify plan of care as follows:    []  Hold pending physician visit  []  Discharge    Time In 0915   Time Out 0945   Timed Code Minutes: 30 min   Total Treatment Time: 30 min     Electronically Signed by: Marah Parra PTA

## 2022-04-27 ENCOUNTER — HOSPITAL ENCOUNTER (OUTPATIENT)
Dept: PHYSICAL THERAPY | Age: 52
Setting detail: THERAPIES SERIES
Discharge: HOME | End: 2022-04-27
Payer: MEDICAID

## 2022-04-27 PROCEDURE — 97110 THERAPEUTIC EXERCISES: CPT

## 2022-04-27 NOTE — PROGRESS NOTES
[address] [facility]  PHYSICAL THERAPY  [] EVALUATION  [x] DAILY NOTE (LAND) [] DAILY NOTE (AQUATIC ) [] PROGRESS NOTE [] DISCHARGE NOTE    [x] OUTPATIENT REHABILITATION [facility]   [] [place] [address]    [] [facility]   [] [facility]    Date: 2022  Patient Name:  Lory Tinajero  : 1970  MRN: [id number]  CSN: [id number]    Referring Practitioner Skinny Mcgarry MD   Diagnosis Other intervertebral disc degeneration, lumbosacral region [M51.37]  Spondylosis without myelopathy or radiculopathy, lumbosacral region [M47.817]  Other cervical disc degeneration, high cervical region [M50.31]  Other cervical disc degeneration at C4-C5 level [M50.321]  Other cervical disc degeneration at C5-C6 level [M50.322]    Treatment Diagnosis Pain in low back, pain in neck, decreased trunk ROM, decreased cervical ROM, weakness in core, weakness in hips, poor posture, abnormal gait    Date of Evaluation 22    Additional Pertinent History Arthritis, SOB       Functional Outcome Measure Used Modified Oswestry    Functional Outcome Score 18/50 (22)       Insurance: Primary: Payor: [employer]p /  /  / ,   Secondary:    Authorization Information: INSURANCE THERAPY BENEFIT: No precert until after 69ZH visit. Visit Limit Based on Precert  AQUATIC THERAPY COVERED:   Yes  MODALITIES COVERED:  Yes except for Iontophoresis and Hot/Cold Packs. TELEHEALTH COVERED: Yes   Visit # 3, 3/10 for progress note   Visits Allowed: 30 visits    Recertification Date:    Physician Follow-Up:    Physician Orders:    History of Present Illness: Patient reports that she has had neck and back pain since 2019. Patient reports that she did not have any specific injury. Patient reports that she does have numbness/tingling in her arms that goes into her hands. Patient reports that she does have numbness/tingling in L leg also that goes to her feet/toes.  Patient reports that her neck pain radiates in her shoulder and her back pain radiates into her hips. Patient reports pain with walking, standing, bending, carrying. Patient reports that she did have x-ray done recently. SUBJECTIVE: Patient states she is more sore today due to the exercises. Rates current pain 5/10 in both neck and back. Reports compliance with HEP. TREATMENT   Precautions:    Pain: 5/10 Neck (R>L) and Back (L>R)     X in shaded column indicates activity completed today   Modalities Parameters/  Location  Notes                     Manual Therapy Time/Technique  Notes                     Exercise/Intervention   Notes   BACK       Ab brace  10x 5 sec  X    Ab brace with marching 10x  X    Ab brace with ball and band 10x 5 sec  x Added today   Piriformis stretch into IR and ER  3x 20 sec  X    Modified cross body LE stretch  3x 20 sec  X    HS stretch supine with towel behind knee 3x 20 sec  X    LTR 5x 10 sec  X           NECK       Upper trap/levator scap stretch 3x 20 sec  X    Dive stretch 3x 20 sec  x    Chin tuck 10x 3 sec  X    Scap retraction  10x 5 sec  X    Posterior shoulder rolls 10x  X                                Educated on pillow support and sleeping positions       Educated on postural awareness   x      Specific Interventions Next Treatment: NECK: upper trap/levator scap/pec stretch, chin tucks, scapular retraction, posture/cervical stability exercises; BACK: core strength, piriformis/HS stretching/hip flexor stretch, hip IR and ER ROM, modalities as needed. Activity/Treatment Tolerance:  [x]  Patient tolerated treatment well []  Patient limited by fatigue  []  Patient limited by pain   []  Patient limited by medical complications  []  Other:     Assessment: Patient completed exercises as listed above working on flexibility, core stability and strength. Added ab brace with ball and band and dive stretch. Also discussed postural awareness with patient.  Provided cues for abdominal bracing technique with proper breathing. Will continue to progress as tolerated by patient. Goals    Patient Goal: improve pain     Short Term Goals: 4 weeks  1. Patient will report decrease in pain to 5/10 at most to allow ease of ADLs and household tasks. 2. Patient will improve B cervical lateral flexion AROM to 35 degrees to allow decreased pain with household tasks. 3. Patient will improve B cervical rotation AROM to 70 degrees to allow decreased pain with turning head during driving. 4. Patient will improve trunk AROM to [facility] to allow ease of bending and lifting tasks. 5. Patient will improve B hip strength to 5/5 to allow ease of walking and daily tasks. 6. Patient will perform B SLR for 25 seconds to improve core strength needed for ease of standing tasks. 7. Patient will improve 90/90 hamstring length to 20 degrees B to allow decreased pain with standing/walking. Long Term Goals: 8 weeks  1. Patient will improve Modified Oswestry score from 18/50 to 9/50 to allow decrease in disability and improved functional mobility. 2. Patient will be independent with HEP in order to prevent re-injury and improve functional abilities. Patient Education:    [x]  HEP/Education Completed: Patient given update HO for HEP. [employer] Access Code: [email or web address]  []  No new Education completed  []  Reviewed Prior HEP      [x]  Patient verbalized and/or demonstrated understanding of education provided. []  Patient unable to verbalize and/or demonstrate understanding of education provided. Will continue education. []  Barriers to learning:     PLAN:  Treatment Recommendations: Strengthening, Range of Motion, Gait Training, Stair Training, Neuromuscular Re-education, Manual Therapy - Soft Tissue Mobilization, Manual Therapy - Joint Manipulation, Pain Management, Home Exercise Program, Patient Education, Integrative Dry Needling, Aquatics and Modalities    []  Plan of care initiated.   Plan to see patient 2 times per week for 8 weeks to address the treatment planned outlined above.   [x]  Continue with current plan of care  []  Modify plan of care as follows:    []  Hold pending physician visit  []  Discharge    Time In 1135   Time Out 1215   Timed Code Minutes: 40 min   Total Treatment Time: 40 min     Electronically Signed by: Caroline Dueñas, PTA

## 2022-05-02 ENCOUNTER — HOSPITAL ENCOUNTER (OUTPATIENT)
Dept: PHYSICAL THERAPY | Age: 52
Setting detail: THERAPIES SERIES
Discharge: HOME | End: 2022-05-02
Payer: MEDICAID

## 2022-05-02 PROCEDURE — 97110 THERAPEUTIC EXERCISES: CPT

## 2022-05-02 NOTE — PROGRESS NOTES
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.817]  Other cervical disc degeneration, high cervical region [M50.31]  Other cervical disc degeneration at C4-C5 level [M50.321]  Other cervical disc degeneration at C5-C6 level [M50.322]    Treatment Diagnosis Pain in low back, pain in neck, decreased trunk ROM, decreased cervical ROM, weakness in core, weakness in hips, poor posture, abnormal gait    Date of Evaluation 22    Additional Pertinent History Arthritis, SOB       Functional Outcome Measure Used Modified Oswestry    Functional Outcome Score 18/50 (22)       Insurance: Primary: Payor: Mary Grace Rodríguez /  /  / ,   Secondary:    Authorization Information: INSURANCE THERAPY BENEFIT: No precert until after 30FX visit. Visit Limit Based on Precert  AQUATIC THERAPY COVERED:   Yes  MODALITIES COVERED:  Yes except for Iontophoresis and Hot/Cold Packs. TELEHEALTH COVERED: Yes   Visit # 4, 4/10 for progress note   Visits Allowed: 30 visits    Recertification Date:    Physician Follow-Up:    Physician Orders:    History of Present Illness: Patient reports that she has had neck and back pain since 2019. Patient reports that she did not have any specific injury. Patient reports that she does have numbness/tingling in her arms that goes into her hands. Patient reports that she does have numbness/tingling in L leg also that goes to her feet/toes.  Patient reports that her neck pain radiates in her shoulder and her back pain radiates into her hips. Patient reports pain with walking, standing, bending, carrying. Patient reports that she did have x-ray done recently. SUBJECTIVE: Patient reports that she was getting headaches and she had to stop doing the exercises for her neck for a couple days. Patient was not sure if she was doing the exercises too much. Patient reports that therapy does help on some days. Patient did have hip cramping with theraband exercise also. Patient reports that she has 2/10 pain in back and neck upon arrival.     TREATMENT   Precautions:    Pain: 2/10 Neck (R>L) and Back (L>R)     X in shaded column indicates activity completed today   Modalities Parameters/  Location  Notes                     Manual Therapy Time/Technique  Notes                     Exercise/Intervention   Notes   BACK       Ab brace  15x 5 sec  X    Ab brace with marching 15x  X    Ab brace with hip adduction ball squeeze and hip abduction with band unilateral  15x 5 sec  x Hip abduction one leg at a time due to cramping with bilateral.   Piriformis stretch into IR and ER  3x 20 sec  X    Modified cross body LE stretch  3x 20 sec  X    HS stretch supine with towel behind knee 3x 20 sec  X    LTR 5x 10 sec  X    EOB hip flexor stretch  3 x 20 sec  X           NECK       Upper trap/levator scap stretch 3x 20 sec  X    Dive stretch 3x 20 sec  x    Chin tuck 10x 3 sec  X    Scap retraction  10x 5 sec  X    Posterior shoulder rolls 10x  X                                Educated on pillow support and sleeping positions       Educated on postural awareness         Specific Interventions Next Treatment: NECK: upper trap/levator scap/pec stretch, chin tucks, scapular retraction, posture/cervical stability exercises; BACK: core strength, piriformis/HS stretching/hip flexor stretch, hip IR and ER ROM, modalities as needed.      Activity/Treatment Tolerance:  [x]  Patient tolerated treatment well  []  Patient limited by fatigue  []  Patient limited by pain   []  Patient limited by medical complications  []  Other:     Assessment: Patient did have some mild tingling and numbness in R arm after neck exercises today but it does resolve quickly after the exercise is stopped. Patient requiring verbal and visual instruction for exercise technique. Patient educated on avoiding holding her breath with abdominal bracing. Patient reports pain about the same at 2/10 after the session. Will progress patient as able to improve functional mobility. Goals    Patient Goal: improve pain     Short Term Goals: 4 weeks  1. Patient will report decrease in pain to 5/10 at most to allow ease of ADLs and household tasks. 2. Patient will improve B cervical lateral flexion AROM to 35 degrees to allow decreased pain with household tasks. 3. Patient will improve B cervical rotation AROM to 70 degrees to allow decreased pain with turning head during driving. 4. Patient will improve trunk AROM to [facility] to allow ease of bending and lifting tasks. 5. Patient will improve B hip strength to 5/5 to allow ease of walking and daily tasks. 6. Patient will perform B SLR for 25 seconds to improve core strength needed for ease of standing tasks. 7. Patient will improve 90/90 hamstring length to 20 degrees B to allow decreased pain with standing/walking. Long Term Goals: 8 weeks  1. Patient will improve Modified Oswestry score from 18/50 to 9/50 to allow decrease in disability and improved functional mobility. 2. Patient will be independent with HEP in order to prevent re-injury and improve functional abilities.     Patient Education:   [x]  HEP/Education Completed: continue HEP, correct exercise technique, avoid holding her breath with abdominal bracing  [email or web address] Access Code: [email or web address]  []  No new Education completed  []  Reviewed Prior HEP      [x]  Patient verbalized and/or demonstrated understanding of education provided. []  Patient unable to verbalize and/or demonstrate understanding of education provided. Will continue education. []  Barriers to learning:     PLAN:  Treatment Recommendations: Strengthening, Range of Motion, Gait Training, Stair Training, Neuromuscular Re-education, Manual Therapy - Soft Tissue Mobilization, Manual Therapy - Joint Manipulation, Pain Management, Home Exercise Program, Patient Education, Integrative Dry Needling, Aquatics and Modalities    []  Plan of care initiated. Plan to see patient 2 times per week for 8 weeks to address the treatment planned outlined above.   [x]  Continue with current plan of care  []  Modify plan of care as follows:    []  Hold pending physician visit  []  Discharge    Time In 1001   Time Out 10   Timed Code Minutes: 40 min   Total Treatment Time: 40 min     Electronically Signed by: Francesco Tolentino PT

## 2022-05-04 ENCOUNTER — APPOINTMENT (OUTPATIENT)
Dept: PHYSICAL THERAPY | Age: 52
End: 2022-05-04
Payer: MEDICAID

## 2022-05-09 ENCOUNTER — HOSPITAL ENCOUNTER (OUTPATIENT)
Dept: PHYSICAL THERAPY | Age: 52
Setting detail: THERAPIES SERIES
Discharge: HOME | End: 2022-05-09
Payer: MEDICAID

## 2022-05-09 PROCEDURE — 97110 THERAPEUTIC EXERCISES: CPT

## 2022-05-09 NOTE — PROGRESS NOTES
[address] [facility]  PHYSICAL THERAPY  [] EVALUATION  [x] DAILY NOTE (LAND) [] DAILY NOTE (AQUATIC ) [] PROGRESS NOTE [] DISCHARGE NOTE    [x] OUTPATIENT REHABILITATION [facility]   [] [place] [address]    [] [facility]   [] Guru Damon    Date: 2022  Patient Name:  Reji Walters  : 1970  MRN: [id number]  CSN: [id number]    Referring Practitioner Richardson Raines MD   Diagnosis Other intervertebral disc degeneration, lumbosacral region [M51.37]  Spondylosis without myelopathy or radiculopathy, lumbosacral region [M47.817]  Other cervical disc degeneration, high cervical region [M50.31]  Other cervical disc degeneration at C4-C5 level [M50.321]  Other cervical disc degeneration at C5-C6 level [M50.322]    Treatment Diagnosis Pain in low back, pain in neck, decreased trunk ROM, decreased cervical ROM, weakness in core, weakness in hips, poor posture, abnormal gait    Date of Evaluation 22    Additional Pertinent History Arthritis, SOB       Functional Outcome Measure Used Modified Oswestry    Functional Outcome Score 18/50 (22)       Insurance: Primary: Payor: [employer] /  /  / ,   Secondary:    Authorization Information: INSURANCE THERAPY BENEFIT: No precert until after 91BW visit. Visit Limit Based on Precert  AQUATIC THERAPY COVERED:   Yes  MODALITIES COVERED:  Yes except for Iontophoresis and Hot/Cold Packs. TELEHEALTH COVERED: Yes   Visit # 5, 5/10 for progress note   Visits Allowed: 30 visits    Recertification Date:    Physician Follow-Up:    Physician Orders:    History of Present Illness: Patient reports that she has had neck and back pain since 2019. Patient reports that she did not have any specific injury. Patient reports that she does have numbness/tingling in her arms that goes into her hands. Patient reports that she does have numbness/tingling in L leg also that goes to her feet/toes.  Patient reports that her neck pain radiates in her shoulder and her back pain radiates into her hips. Patient reports pain with walking, standing, bending, carrying. Patient reports that she did have x-ray done recently. MCKENZIE SIFUENTESECTIVE: Patient states she has more pain and rates current pain 5/10 in back and neck upon arrival. Patient states she is the only one able at home to do housework and carry groceries. Has more pain performing these ADL's. Reports the exercises have been helping to give some relief. TREATMENT   Precautions:    Pain: 5/10 Neck (R>L) and Back (L>R)     X in shaded column indicates activity completed today   Modalities Parameters/  Location  Notes   MHP to lower back  x With exercise               Manual Therapy Time/Technique  Notes                     Exercise/Intervention   Notes   BACK       Ab brace  15x 5 sec  X    Ab brace with marching 15x  X    Ab brace with hip adduction ball squeeze and hip abduction with band unilateral  15x 5 sec  x Hip abduction one leg at a time due to cramping with bilateral.   Piriformis stretch into IR and ER  3x 20 sec  X    Modified cross body LE stretch  3x 20 sec      HS stretch supine with towel behind knee 3x 20 sec  X    LTR 5x 10 sec  X    EOB hip flexor stretch  3 x 20 sec  X           NECK       Upper trap/levator scap stretch 3x 20 sec  X    Dive stretch 3x 20 sec  x    Chin tuck 10x 3 sec  X    Scap retraction  10x 5 sec  X    Posterior shoulder rolls 10x  X                                Educated on pillow support and sleeping positions       Educated on postural awareness         Specific Interventions Next Treatment: NECK: upper trap/levator scap/pec stretch, chin tucks, scapular retraction, posture/cervical stability exercises; BACK: core strength, piriformis/HS stretching/hip flexor stretch, hip IR and ER ROM, modalities as needed.      Activity/Treatment Tolerance:  [x]  Patient tolerated treatment well  []  Patient limited by fatigue  []  Patient limited by pain   []  Patient limited by medical complications  []  Other:     Assessment: Patient completed exercises as listed above working on flexibility, core stability and strength. Added MHP to lower back during treatment. No progressions due to increased pain today and focused on stretches. Provided cues for proper technique. Will continue to progress to improve functional mobility. Goals    Patient Goal: improve pain     Short Term Goals: 4 weeks  1. Patient will report decrease in pain to 5/10 at most to allow ease of ADLs and household tasks. 2. Patient will improve B cervical lateral flexion AROM to 35 degrees to allow decreased pain with household tasks. 3. Patient will improve B cervical rotation AROM to 70 degrees to allow decreased pain with turning head during driving. 4. Patient will improve trunk AROM to [facility] to allow ease of bending and lifting tasks. 5. Patient will improve B hip strength to 5/5 to allow ease of walking and daily tasks. 6. Patient will perform B SLR for 25 seconds to improve core strength needed for ease of standing tasks. 7. Patient will improve 90/90 hamstring length to 20 degrees B to allow decreased pain with standing/walking. Long Term Goals: 8 weeks  1. Patient will improve Modified Oswestry score from 18/50 to 9/50 to allow decrease in disability and improved functional mobility. 2. Patient will be independent with HEP in order to prevent re-injury and improve functional abilities. Patient Education:   [x]  HEP/Education Completed: continue HEP, correct exercise technique, avoid holding her breath with abdominal bracing  [email or web address] Access Code: [email or web address]  []  No new Education completed  []  Reviewed Prior HEP      [x]  Patient verbalized and/or demonstrated understanding of education provided. []  Patient unable to verbalize and/or demonstrate understanding of education provided. Will continue education.   []  Barriers to learning:     PLAN:  Treatment Recommendations: Strengthening, Range of Motion, Gait Training, Stair Training, Neuromuscular Re-education, Manual Therapy - Soft Tissue Mobilization, Manual Therapy - Joint Manipulation, Pain Management, Home Exercise Program, Patient Education, Integrative Dry Needling, Aquatics and Modalities    []  Plan of care initiated. Plan to see patient 2 times per week for 8 weeks to address the treatment planned outlined above.   [x]  Continue with current plan of care  []  Modify plan of care as follows:    []  Hold pending physician visit  []  Discharge    Time In 1045   Time Out 1125   Timed Code Minutes: 40 min   Total Treatment Time: 40 min     Electronically Signed by: Manny Berumen PTA

## 2022-05-16 ENCOUNTER — HOSPITAL ENCOUNTER (OUTPATIENT)
Dept: PHYSICAL THERAPY | Age: 52
Setting detail: THERAPIES SERIES
Discharge: HOME | End: 2022-05-16
Payer: MEDICAID

## 2022-05-16 PROCEDURE — 97032 APPL MODALITY 1+ESTIM EA 15: CPT

## 2022-05-16 PROCEDURE — 97110 THERAPEUTIC EXERCISES: CPT

## 2022-05-16 NOTE — PROGRESS NOTES
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]  Other cervical disc degeneration, high cervical region [M50.31]  Other cervical disc degeneration at C4-C5 level [M50.321]  Other cervical disc degeneration at C5-C6 level [M50.322]    Treatment Diagnosis Pain in low back, pain in neck, decreased trunk ROM, decreased cervical ROM, weakness in core, weakness in hips, poor posture, abnormal gait    Date of Evaluation 22    Additional Pertinent History Arthritis, SOB       Functional Outcome Measure Used Modified Oswestry    Functional Outcome Score 18/50 (22)       Insurance: Primary: Payor: [employer] /  /  / ,   Secondary:    Authorization Information: INSURANCE THERAPY BENEFIT: No precert until after 00KF visit. Visit Limit Based on Precert  AQUATIC THERAPY COVERED:   Yes  MODALITIES COVERED:  Yes except for Iontophoresis and Hot/Cold Packs. TELEHEALTH COVERED: Yes   Visit # 6, 6/10 for progress note   Visits Allowed: 30 visits    Recertification Date:    Physician Follow-Up:    Physician Orders:    History of Present Illness: Patient reports that she has had neck and back pain since 2019. Patient reports that she did not have any specific injury. Patient reports that she does have numbness/tingling in her arms that goes into her hands. Patient reports that she does have numbness/tingling in L leg also that goes to her feet/toes.  Patient reports that her neck pain radiates in her shoulder and her back pain radiates into her hips. Patient reports pain with walking, standing, bending, carrying. Patient reports that she did have x-ray done recently. MCKENZIE SIFUENTESECTIVE:  Patient reports she felt a little better after last therapy session but pain increased because of her 10 minute walk home. Patient has been trying to use some heat at home and working on exercises once a day. TREATMENT   Precautions:    Pain: 7/10 Neck (R>L) and Lower Back (L>R), Bilateral hips    X in shaded column indicates activity completed today   Modalities Parameters/  Location  Notes   IFC to lower back with MHP to neck and lower back 4 electrodes, IFC, Intensity 7.5, 20 minutes X Concurrent with exercise               Manual Therapy Time/Technique  Notes                     Exercise/Intervention   Notes   BACK       Ab brace  15x 5 sec  X    Ab brace with marching 15x  X    Ab brace with hip adduction ball squeeze   15x 5 sec  X    and hip abduction with band unilateral 15x 3 sec  X    Piriformis stretch into IR and ER  3x 20 sec  X    Modified cross body LE stretch  3x 20 sec      HS stretch supine with towel behind knee 3x 20 sec  X    LTR 5x 10 sec  X    EOB hip flexor stretch  3x 20 sec             NECK       Upper trap/levator scap stretch 3x 20 sec  X    Dive stretch 3x 20 sec  X    Chin tuck 10x 3 sec  X    Scap retraction  10x 5 sec  X    Posterior shoulder rolls 10x  X                                Educated on pillow support and sleeping positions       Educated on postural awareness         Specific Interventions Next Treatment: NECK: upper trap/levator scap/pec stretch, chin tucks, scapular retraction, posture/cervical stability exercises; BACK: core strength, piriformis/HS stretching/hip flexor stretch, hip IR and ER ROM, modalities as needed.      Activity/Treatment Tolerance:  [x]  Patient tolerated treatment well []  Patient limited by fatigue  []  Patient limited by pain   []  Patient limited by medical complications  []  Other:     Assessment: Trial of IFC e-stim with moist heat during exercises to possibly get longer term relief. Patient tolerated activities well. Pain in lower back decreased to 3/10 and not hip pain after e-stim and exercises. Patient felt a \"pop\" in her neck when completing levator stretch and noted increased tingling in bilateral hands. Goals    Patient Goal: improve pain     Short Term Goals: 4 weeks  1. Patient will report decrease in pain to 5/10 at most to allow ease of ADLs and household tasks. 2. Patient will improve B cervical lateral flexion AROM to 35 degrees to allow decreased pain with household tasks. 3. Patient will improve B cervical rotation AROM to 70 degrees to allow decreased pain with turning head during driving. 4. Patient will improve trunk AROM to [facility] to allow ease of bending and lifting tasks. 5. Patient will improve B hip strength to 5/5 to allow ease of walking and daily tasks. 6. Patient will perform B SLR for 25 seconds to improve core strength needed for ease of standing tasks. 7. Patient will improve 90/90 hamstring length to 20 degrees B to allow decreased pain with standing/walking. Long Term Goals: 8 weeks  1. Patient will improve Modified Oswestry score from 18/50 to 9/50 to allow decrease in disability and improved functional mobility. 2. Patient will be independent with HEP in order to prevent re-injury and improve functional abilities. Patient Education:   [x]  HEP/Education Completed: IFC E-stim, use of heat, Continue HEP, correct exercise technique, avoid holding her breath with abdominal bracing   [employer] Access Code: [email or web address]  []  No new Education completed  []  Reviewed Prior HEP      [x]  Patient verbalized and/or demonstrated understanding of education provided. []  Patient unable to verbalize and/or demonstrate understanding of education provided. Will continue education.   []  Barriers to learning:     PLAN:  Treatment Recommendations: Strengthening, Range of Motion, Gait Training, Stair Training, Neuromuscular Re-education, Manual Therapy - Soft Tissue Mobilization, Manual Therapy - Joint Manipulation, Pain Management, Home Exercise Program, Patient Education, Integrative Dry Needling, Aquatics and Modalities    []  Plan of care initiated. Plan to see patient 2 times per week for 8 weeks to address the treatment planned outlined above.   [x]  Continue with current plan of care  []  Modify plan of care as follows:    []  Hold pending physician visit  []  Discharge    Time In 1000   Time Out 1045   Timed Code Minutes: 45 min   Total Treatment Time: 45 min     Electronically Signed by: Nuris Narvaez PTA

## 2022-05-18 ENCOUNTER — HOSPITAL ENCOUNTER (OUTPATIENT)
Dept: PHYSICAL THERAPY | Age: 52
Setting detail: THERAPIES SERIES
Discharge: HOME | End: 2022-05-18
Payer: MEDICAID

## 2022-05-18 PROCEDURE — 97110 THERAPEUTIC EXERCISES: CPT

## 2022-05-18 NOTE — DISCHARGE SUMMARY
[facility]  PHYSICAL THERAPY  [] EVALUATION  [] DAILY NOTE (LAND) [] DAILY NOTE (AQUATIC ) [] PROGRESS NOTE [x] DISCHARGE NOTE    [x] OUTPATIENT REHABILITATION [facility]   [] [place] [address]    [] [facility]   [] Eugene Rivera    Date: 2022  Patient Name:  Charanjit Montanez  : 1970  MRN: [id number]  CSN: [id number]    Referring Practitioner Deisy Crabtree MD   Diagnosis Other intervertebral disc degeneration, lumbosacral region [M51.37]  Spondylosis without myelopathy or radiculopathy, lumbosacral region [M47.817]  Other cervical disc degeneration, high cervical region [M50.31]  Other cervical disc degeneration at C4-C5 level [M50.321]  Other cervical disc degeneration at C5-C6 level [M50.322]    Treatment Diagnosis Pain in low back, pain in neck, decreased trunk ROM, decreased cervical ROM, weakness in core, weakness in hips, poor posture, abnormal gait    Date of Evaluation 22    Additional Pertinent History Arthritis, SOB       Functional Outcome Measure Used Modified Oswestry    Functional Outcome Score 18/50 (22) 18/50 (22)      Insurance: Primary: Payor: Gamal Baez /  /  / ,   Secondary:    Authorization Information: INSURANCE THERAPY BENEFIT: No precert until after 25RG visit. Visit Limit Based on Precert  AQUATIC THERAPY COVERED:   Yes  MODALITIES COVERED:  Yes except for Iontophoresis and Hot/Cold Packs. TELEHEALTH COVERED: Yes   Visit # 7, 0/10 for progress note   Visits Allowed: 30 visits    Recertification Date: 3/55/94   Physician Follow-Up:    Physician Orders:    History of Present Illness: Patient reports that she has had neck and back pain since 2019. Patient reports that she did not have any specific injury. Patient reports that she does have numbness/tingling in her arms that goes into her hands. Patient reports that she does have numbness/tingling in L leg also that goes to her feet/toes. Patient reports that her neck pain radiates in her shoulder and her back pain radiates into her hips. Patient reports pain with walking, standing, bending, carrying. Patient reports that she did have x-ray done recently. MCKENZIE SIFUENTESECTIVE:  Patient reports that she is not sure if therapy is helping. Patient reports her pain is still there. Patient reports that she has to walk here and back from her house. Patient reports that her hips are worse with her having to walk to appointments. Patient reports 5/10 pain in back, hips, and neck upon arrival. Patient reports 7/10 pain at most in the past week. TREATMENT   Precautions:    Pain: 5/10 Neck (R>L) and Lower Back (L>R), Bilateral hips    X in shaded column indicates activity completed today   Modalities Parameters/  Location  Notes   IFC to lower back with MHP to neck and lower back 4 electrodes to low back L side, IFC, Intensity 10, 25 minutes X Concurrent with exercise. Skin intact pre and post treatment. No adverse response to treatment.                Manual Therapy Time/Technique  Notes                     Exercise/Intervention   Notes   BACK       Ab brace  15x 5 sec  X    Ab brace with marching 15x  X    Ab brace with hip adduction ball squeeze   15x 5 sec  X    hip abduction with band unilateral 15x 3 sec  X No band today   Piriformis stretch into IR and ER  3x 20 sec  X    Modified cross body LE stretch  3x 20 sec      HS stretch supine with towel behind knee 3x 20 sec  X    LTR 5x 10 sec  X    EOB hip flexor stretch  3x 20 sec             NECK       Upper trap/levator scap stretch 3x 20 sec      Dive stretch 3x 20 sec      Chin tuck 10x 3 sec      Scap retraction  10x 5 sec      Posterior shoulder rolls 10x                    Re-assessment measurements   x    Review of HEP   x    Educated on pillow support and sleeping positions       Educated on postural awareness         Specific Interventions Next Treatment: NECK: upper trap/levator scap/pec stretch, chin tucks, scapular retraction, posture/cervical stability exercises; BACK: core strength, piriformis/HS stretching/hip flexor stretch, hip IR and ER ROM, modalities as needed. Activity/Treatment Tolerance:  [x]  Patient tolerated treatment well  []  Patient limited by fatigue  []  Patient limited by pain   []  Patient limited by medical complications  []  Other:     Assessment: Patient seen for progress report. Patient has been seen for 7 visits at this time. Patient has made some progress toward goals. Patient improved cervical lateral flexion from 21-29 deg to 26-35 deg. Patients cervical rotation AROM was about the same. Patient improved core strength with B SLR 38 seconds today compared to 15 seconds at eval. Patient improved B hip strength as seen below. Patient improved 90/90 hamstring length to 15-20 deg. Patient improved trunk rotation AROM. Patient scoring the same on Modified Oswestry at 18/50. Patient does continue to have pain in neck and back, decreased trunk and cervical ROM, weakness in hips that limits her ability to perform daily tasks. Patient would benefit from continuing to perform HEP to maintain gains from therapy. Patient would like to follow up with MD at this time with possible additional testing due to her continued high levels of pain. Patient requesting to be discharged to conserve visits if she were to have a surgery. Goals    Patient Goal: improve pain     Short Term Goals: 4 weeks  1. Patient will report decrease in pain to 5/10 at most to allow ease of ADLs and household tasks. GOAL NOT MET: Patient reports 5/10 pain in back, hips, and neck upon arrival. Patient reports 7/10 pain at most in the past week. .  Discontinue Goal  2. Patient will improve B cervical lateral flexion AROM to 35 degrees to allow decreased pain with household tasks. GOAL NOT MET: R 35 deg L 26 deg. Discontinue Goal  3.  Patient will improve B cervical rotation AROM to 70 degrees to allow decreased pain with turning head during driving. GOAL NOT MET: R 60 deg and L 55 deg. Discontinue Goal  4. Patient will improve trunk AROM to [facility] to allow ease of bending and lifting tasks. GOAL NOT MET: Trunk flexion WFL, extension 25% limited, B lateral flexion 25% limited, B rotation WFLs. Discontinue Goal  5. Patient will improve B hip strength to 5/5 to allow ease of walking and daily tasks. GOAL NOT MET: R hip flexion 4+/5, L hip flexion 4/5; L hip extension 4+/5, R hip extension 4/5, B hip abduction 4+/5. Discontinue Goal  6. Patient will perform B SLR for 25 seconds to improve core strength needed for ease of standing tasks. GOAL MET:  B SLR 38 seconds . Discontinue Goal  7. Patient will improve 90/90 hamstring length to 20 degrees B to allow decreased pain with standing/walking. GOAL MET:  L 17 deg and R 20 deg. Discontinue Goal    Long Term Goals: 8 weeks  1. Patient will improve Modified Oswestry score from 18/50 to 9/50 to allow decrease in disability and improved functional mobility. GOAL NOT MET: BRYCE 18/50. Discontinue Goal  2. Patient will be independent with HEP in order to prevent re-injury and improve functional abilities. GOAL MET:  Patient is performing HEP. Discontinue Goal    Patient Education:   [x]  HEP/Education Completed: continue HEP   [facility] [email or web address]  []  No new Education completed  [x]  Reviewed Prior HEP      [x]  Patient verbalized and/or demonstrated understanding of education provided. []  Patient unable to verbalize and/or demonstrate understanding of education provided. Will continue education.   []  Barriers to learning:     PLAN:  Treatment Recommendations: Strengthening, Range of Motion, Gait Training, Stair Training, Neuromuscular Re-education, Manual Therapy - Soft Tissue Mobilization, Manual Therapy - Joint Manipulation, Pain Management, Home Exercise Program, Patient Education, Integrative Dry Needling, Aquatics and Modalities    []  Plan of care initiated. Plan to see patient 2 times per week for 8 weeks to address the treatment planned outlined above.   []  Continue with current plan of care  []  Modify plan of care as follows:    []  Hold pending physician visit  [x]  Discharge    Time In 1016   Time Out 1100   Timed Code Minutes: 44 min   Total Treatment Time: 44 min     Electronically Signed by: Yoana Gamble PT

## 2022-08-26 ENCOUNTER — HOSPITAL ENCOUNTER (OUTPATIENT)
Dept: MRI IMAGING | Age: 52
Discharge: HOME | End: 2022-08-26
Payer: MEDICAID

## 2022-08-26 DIAGNOSIS — M50.322 OTHER CERVICAL DISC DEGENERATION AT C5-C6 LEVEL: ICD-10-CM

## 2022-08-26 DIAGNOSIS — M70.61 TROCHANTERIC BURSITIS OF RIGHT HIP: ICD-10-CM

## 2022-08-26 DIAGNOSIS — M70.62 TROCHANTERIC BURSITIS OF LEFT HIP: ICD-10-CM

## 2022-08-26 DIAGNOSIS — M50.31 OTHER CERVICAL DISC DEGENERATION, HIGH CERVICAL REGION: ICD-10-CM

## 2022-08-26 DIAGNOSIS — M47.817 FACET ARTHROPATHY, LUMBOSACRAL: ICD-10-CM

## 2022-08-26 DIAGNOSIS — M51.37 OTHER INTERVERTEBRAL DISC DEGENERATION, LUMBOSACRAL REGION: ICD-10-CM

## 2022-08-26 DIAGNOSIS — M50.321 OTHER CERVICAL DISC DEGENERATION AT C4-C5 LEVEL: ICD-10-CM

## 2022-08-26 PROCEDURE — 72148 MRI LUMBAR SPINE W/O DYE: CPT

## 2022-08-26 PROCEDURE — 72141 MRI NECK SPINE W/O DYE: CPT

## 2022-09-20 ENCOUNTER — HOSPITAL ENCOUNTER (OUTPATIENT)
Age: 52
Setting detail: SPECIMEN
Discharge: HOME | End: 2022-09-20

## 2022-09-20 LAB
ABSOLUTE EOS #: 0.15 K/UL (ref 0–0.44)
ABSOLUTE IMMATURE GRANULOCYTE: 0.03 K/UL (ref 0–0.3)
ABSOLUTE LYMPH #: 3.01 K/UL (ref 1.1–3.7)
ABSOLUTE MONO #: 0.72 K/UL (ref 0.1–1.2)
ALBUMIN SERPL-MCNC: 4.5 G/DL (ref 3.5–5.2)
ALBUMIN/GLOBULIN RATIO: 1.5 (ref 1–2.5)
ALP BLD-CCNC: 67 U/L (ref 35–104)
ALT SERPL-CCNC: 21 U/L (ref 5–33)
ANION GAP SERPL CALCULATED.3IONS-SCNC: 11 MMOL/L (ref 9–17)
AST SERPL-CCNC: 19 U/L
BASOPHILS # BLD: 1 % (ref 0–2)
BASOPHILS ABSOLUTE: 0.08 K/UL (ref 0–0.2)
BILIRUB SERPL-MCNC: 0.3 MG/DL (ref 0.3–1.2)
BUN BLDV-MCNC: 15 MG/DL (ref 6–20)
CALCIUM SERPL-MCNC: 9.9 MG/DL (ref 8.6–10.4)
CHLORIDE BLD-SCNC: 104 MMOL/L (ref 98–107)
CHOLESTEROL/HDL RATIO: 5.3
CHOLESTEROL: 185 MG/DL
CO2: 28 MMOL/L (ref 20–31)
CREAT SERPL-MCNC: 0.9 MG/DL (ref 0.5–0.9)
EOSINOPHILS RELATIVE PERCENT: 2 % (ref 1–4)
GFR AFRICAN AMERICAN: >60 ML/MIN
GFR NON-AFRICAN AMERICAN: >60 ML/MIN
GFR SERPL CREATININE-BSD FRML MDRD: NORMAL ML/MIN/{1.73_M2}
GLUCOSE BLD-MCNC: 83 MG/DL (ref 70–99)
HCT VFR BLD CALC: 49.8 % (ref 36.3–47.1)
HDLC SERPL-MCNC: 35 MG/DL
HEMOGLOBIN: 15.7 G/DL (ref 11.9–15.1)
IMMATURE GRANULOCYTES: 0 %
LDL CHOLESTEROL: 89 MG/DL (ref 0–130)
LYMPHOCYTES # BLD: 32 % (ref 24–43)
MCH RBC QN AUTO: 28.3 PG (ref 25.2–33.5)
MCHC RBC AUTO-ENTMCNC: 31.5 G/DL (ref 28.4–34.8)
MCV RBC AUTO: 89.7 FL (ref 82.6–102.9)
MONOCYTES # BLD: 8 % (ref 3–12)
NRBC AUTOMATED: 0 PER 100 WBC
PDW BLD-RTO: 12.2 % (ref 11.8–14.4)
PLATELET # BLD: 312 K/UL (ref 138–453)
PMV BLD AUTO: 9.6 FL (ref 8.1–13.5)
POTASSIUM SERPL-SCNC: 4.2 MMOL/L (ref 3.7–5.3)
RBC # BLD: 5.55 M/UL (ref 3.95–5.11)
SEG NEUTROPHILS: 57 % (ref 36–65)
SEGMENTED NEUTROPHILS ABSOLUTE COUNT: 5.42 K/UL (ref 1.5–8.1)
SODIUM BLD-SCNC: 143 MMOL/L (ref 135–144)
TOTAL PROTEIN: 7.6 G/DL (ref 6.4–8.3)
TRIGL SERPL-MCNC: 303 MG/DL
TSH SERPL DL<=0.05 MIU/L-ACNC: 1.53 UIU/ML (ref 0.3–5)
WBC # BLD: 9.4 K/UL (ref 3.5–11.3)

## 2022-09-22 ENCOUNTER — HOSPITAL ENCOUNTER (OUTPATIENT)
Dept: MRI IMAGING | Age: 52
Discharge: HOME | End: 2022-09-22

## 2022-09-22 ENCOUNTER — HOSPITAL ENCOUNTER (OUTPATIENT)
Dept: CT IMAGING | Age: 52
Discharge: HOME | End: 2022-09-22

## 2022-09-22 ENCOUNTER — HOSPITAL ENCOUNTER (OUTPATIENT)
Dept: GENERAL RADIOLOGY | Age: 52
Discharge: HOME | End: 2022-09-22

## 2022-09-22 DIAGNOSIS — Z00.6 EXAMINATION FOR NORMAL COMPARISON FOR CLINICAL RESEARCH: ICD-10-CM

## 2022-09-28 ENCOUNTER — OFFICE VISIT (OUTPATIENT)
Dept: PULMONOLOGY | Age: 52
End: 2022-09-28
Payer: MEDICAID

## 2022-09-28 VITALS
WEIGHT: 167 LBS | BODY MASS INDEX: 33.67 KG/M2 | TEMPERATURE: 97.4 F | OXYGEN SATURATION: 98 % | HEIGHT: 59 IN | SYSTOLIC BLOOD PRESSURE: 126 MMHG | HEART RATE: 77 BPM | DIASTOLIC BLOOD PRESSURE: 70 MMHG

## 2022-09-28 DIAGNOSIS — R91.1 NODULE OF LOWER LOBE OF RIGHT LUNG: ICD-10-CM

## 2022-09-28 DIAGNOSIS — Z87.891 PERSONAL HISTORY OF TOBACCO USE, PRESENTING HAZARDS TO HEALTH: Primary | ICD-10-CM

## 2022-09-28 PROCEDURE — G8419 CALC BMI OUT NRM PARAM NOF/U: HCPCS | Performed by: INTERNAL MEDICINE

## 2022-09-28 PROCEDURE — G8427 DOCREV CUR MEDS BY ELIG CLIN: HCPCS | Performed by: INTERNAL MEDICINE

## 2022-09-28 PROCEDURE — 4004F PT TOBACCO SCREEN RCVD TLK: CPT | Performed by: INTERNAL MEDICINE

## 2022-09-28 PROCEDURE — 99204 OFFICE O/P NEW MOD 45 MIN: CPT | Performed by: INTERNAL MEDICINE

## 2022-09-28 PROCEDURE — 3017F COLORECTAL CA SCREEN DOC REV: CPT | Performed by: INTERNAL MEDICINE

## 2022-09-28 RX ORDER — PRAZOSIN HYDROCHLORIDE 1 MG/1
1 CAPSULE ORAL NIGHTLY
COMMUNITY

## 2022-09-28 RX ORDER — CITALOPRAM 10 MG/1
10 TABLET ORAL DAILY
COMMUNITY

## 2022-09-28 RX ORDER — BUPRENORPHINE AND NALOXONE 8; 2 MG/1; MG/1
1 FILM, SOLUBLE BUCCAL; SUBLINGUAL DAILY
COMMUNITY

## 2022-09-28 NOTE — PROGRESS NOTES
Neck Circumference -   15 inches   Mallampati - 4    Lung Nodule Screening     [x] Qualifies    [] Does not qualify   [] Declined    [] Completed

## 2022-09-28 NOTE — PATIENT INSTRUCTIONS
Recommendations/Plan:  -Patient educated to quit smoking as soon as possible. Patient verbalizes understanding of adverse consequences of tobacco smoking.  -The patient advised to obtain CTA scan of chest films from [facility] performed on 2 July 2018 and a CT and submit to [facility] radiology department to load into PACS system for future comparison.  -Patient advised to obtain her CT scan of her abdomen pelvis official radiological report performed in 2018 from [facility] in [facility], [address] [place] [address] along with the films on a CD to submit to [facility] radiological department to load into PACS system for future comparisons. Patient verbalized understanding.  - Schedule patient for CT scan of chest with IV contrast in 1 to 2 weeks to evaluate her lung fields to follow her ?right lower lobe lung nodule noted on her a CT scan of chest performed in 2018 at [facility] in [address]. The radiological report or films after the above CT scan of chest were not available for me to review or confirm this diagnosis of right lower lobe lung nodule.   -Schedule patient for full pulmonary function tests before clinic visit.  -Santi Reeves was advised to make early appointment with my clinic if she develops any constitutional symptoms including loss of weight, poor appetite or hemoptysis. She verbalizes under standing.  - Schedule patient for follow up with my clinic in 1 to 2weeks with recommended test/s CT scan of chest with IV contrast and full PFTs. Patient advised to make early appointment if needed. - Patient educated about my impression and plan. Patient verbalizes understanding.

## 2022-09-29 ENCOUNTER — HOSPITAL ENCOUNTER (OUTPATIENT)
Dept: GENERAL RADIOLOGY | Age: 52
Discharge: HOME | End: 2022-09-29

## 2022-09-29 ENCOUNTER — HOSPITAL ENCOUNTER (OUTPATIENT)
Dept: CT IMAGING | Age: 52
Discharge: HOME | End: 2022-09-29

## 2022-09-29 DIAGNOSIS — Z00.6 ENCOUNTER FOR EXAMINATION FOR NORMAL COMPARISON OR CONTROL IN CLINICAL RESEARCH PROGRAM: ICD-10-CM

## 2022-10-06 ENCOUNTER — OFFICE VISIT (OUTPATIENT)
Dept: PHYSICAL MEDICINE AND REHAB | Age: 52
End: 2022-10-06
Payer: MEDICAID

## 2022-10-06 VITALS
SYSTOLIC BLOOD PRESSURE: 118 MMHG | HEIGHT: 59 IN | BODY MASS INDEX: 33.67 KG/M2 | HEART RATE: 76 BPM | DIASTOLIC BLOOD PRESSURE: 72 MMHG | WEIGHT: 167 LBS

## 2022-10-06 DIAGNOSIS — M48.061 SPINAL STENOSIS OF LUMBAR REGION WITHOUT NEUROGENIC CLAUDICATION: ICD-10-CM

## 2022-10-06 DIAGNOSIS — M46.1 SI (SACROILIAC) JOINT INFLAMMATION (HCC): ICD-10-CM

## 2022-10-06 DIAGNOSIS — M47.816 SPONDYLOSIS OF LUMBAR REGION WITHOUT MYELOPATHY OR RADICULOPATHY: Primary | ICD-10-CM

## 2022-10-06 DIAGNOSIS — G89.4 CHRONIC PAIN SYNDROME: ICD-10-CM

## 2022-10-06 PROCEDURE — G8484 FLU IMMUNIZE NO ADMIN: HCPCS | Performed by: NURSE PRACTITIONER

## 2022-10-06 PROCEDURE — G8427 DOCREV CUR MEDS BY ELIG CLIN: HCPCS | Performed by: NURSE PRACTITIONER

## 2022-10-06 PROCEDURE — G8417 CALC BMI ABV UP PARAM F/U: HCPCS | Performed by: NURSE PRACTITIONER

## 2022-10-06 PROCEDURE — 99244 OFF/OP CNSLTJ NEW/EST MOD 40: CPT | Performed by: NURSE PRACTITIONER

## 2022-10-06 ASSESSMENT — ENCOUNTER SYMPTOMS
SINUS PRESSURE: 0
CHEST TIGHTNESS: 0
COUGH: 0
VOMITING: 0
CONSTIPATION: 0
EYE PAIN: 0
ABDOMINAL PAIN: 0
SORE THROAT: 0
COLOR CHANGE: 0
WHEEZING: 0
SHORTNESS OF BREATH: 0
BACK PAIN: 1
NAUSEA: 0
PHOTOPHOBIA: 0
DIARRHEA: 0
RHINORRHEA: 0

## 2022-10-06 NOTE — PROGRESS NOTES
HPI:     ChiefComplaint: Lumbar back pain, Left leg pain, and SI pain    New pt here for c.o low back pain mostly all axial  prior but more recently  pain radiates down back of  buttocks thigh into Left Si and LLE  stops above knee. She has had low back pain for  a few years. She works as  and [employer] work. She has had a fall but many years prior to  pain starting. Denies any other injuries falls or trauma. She went to see Dr Glendy Rhodes this year in Spring 2022 due to increased low back pain. She had PT no relief. She  denies any prior lumbar surgery or injections in the past.     She  has h/o of opoid abuse, she is on Suboxone with [facility] in [facility] on [address]       She has constant low back mostly axial facet mediated aching pain 80-90% of the time with walking standing bending, the pain increases  to more severe  sharp stabbing with increased activity and long term walking standing cleaning cooking  and working daily ADLs . She has some  pinching  numbness tingling pain  in left SI  left buttock down LLE at times. 10-20%  She has back stiffness with spasms. She has LLE weakness at times with long term standing walking. She has limited ROM, antalgic gait. She denies any bowel or baldder dysfunction. Patient pain increases with bending, lifting, twisting , reaching, pushing, pulling, walking, standing, stairs, getting up and down, and housework or working at job. Treatments tried PT/HEP, ICE/HEAT, NSAIDS, narcotics, muscle relaxer, OTC rubs creams patches, and massage or TPI  Pain description sharp, shooting, stabbing/jabbing, burning, aching, numbness/tingling, and pins and needles  Pain rating  scale 1-10 highest  7  lowest  3  average   5    Radiology:  FINDINGS:   There is anatomic vertebral body height and alignment. There are couple of foci of hyperintense T1 and T2 signal in the T12 and L4 vertebral bodies as evidence for hemangiomas.  Marrow signal is otherwise within normal limits. The conus terminates in a    normal fashion at the L1 level. The cauda equina is normal in appearance without nerve root thickening or clumping identified. Paraspinal soft tissues are unremarkable. At T12-L1 through L3-4 intervertebral discs are normal in appearance. There is no significant spinal canal or neuroforaminal stenosis. At L4-5 there is a minimal disc bulge which contributes to mild spinal canal stenosis and mild bilateral foraminal stenosis in association with facet hypertrophy and ligament flavum thickening. At L5-S1 there is no significant spinal canal or neuroforaminal stenosis. Impression    Mild degenerative changes at L4-5 where a minimal disc bulge and facet hypertrophy and ligamentum flavum thickening cause mild spinal canal stenosis and mild bilateral neural foraminal stenosis. FINDINGS:   There is anatomic vertebral body height and alignment. Marrow signal is within normal limits. The craniocervical junction appears intact. The cervical spinal cord is normal in caliber without focal area of abnormal T2 signal. Paraspinal soft tissues are    unremarkable. At C2-3 there is no significant spinal canal or neuroforaminal stenosis. At C3-4 there is mild uncovertebral joint degenerative change on the right which minimally narrows the right lateral recess. No significant neural foraminal stenosis is present. At C4-5 there is no significant spinal canal stenosis. There is mild left and moderate right neuroforaminal stenosis in association with uncovertebral joint degenerative change. At C5-6 there is no significant spinal canal or neuroforaminal stenosis. At C6-7 there is a minimal disc bulge which contributes to mild spinal canal stenosis in association with posterior ligament flavum thickening. There is mild bilateral foraminal stenosis in association with mild uncovertebral joint degenerative change.    At C7-T1 there is no significant spinal canal or neuroforaminal stenosis. Impression    Overall mild degenerative changes of the cervical spine more focally pronounced at C4-5 where there is no significant neural canal stenosis but mild left and moderate right neural foraminal stenosis in association with uncovertebral joint degenerative    change. The patient is allergic to ibuprofen and naproxen. Subjective:      Review of Systems   Constitutional:  Positive for activity change. Negative for appetite change, chills, diaphoresis, fatigue, fever and unexpected weight change. HENT:  Negative for congestion, ear pain, hearing loss, mouth sores, nosebleeds, rhinorrhea, sinus pressure and sore throat. Eyes:  Negative for photophobia, pain and visual disturbance. Respiratory:  Negative for cough, chest tightness, shortness of breath and wheezing. Pending PFT  Nodule on right lung   Cardiovascular:  Negative for chest pain and palpitations. Gastrointestinal:  Negative for abdominal pain, constipation, diarrhea, nausea and vomiting. Endocrine: Negative for cold intolerance, heat intolerance, polydipsia, polyphagia and polyuria. Genitourinary:  Negative for decreased urine volume, difficulty urinating, frequency and hematuria. Musculoskeletal:  Positive for arthralgias, back pain, gait problem and myalgias. Negative for joint swelling, neck pain and neck stiffness. Skin:  Negative for color change and rash. Allergic/Immunologic: Negative for food allergies and immunocompromised state. Neurological:  Negative for dizziness, tremors, seizures, syncope, facial asymmetry, speech difficulty, weakness, light-headedness, numbness and headaches. Hematological:  Does not bruise/bleed easily. Psychiatric/Behavioral:  Negative for agitation, behavioral problems, confusion, decreased concentration, dysphoric mood, hallucinations, self-injury, sleep disturbance and suicidal ideas. The patient is nervous/anxious.  The patient is not hyperactive. Depression      Objective:     Vitals:    10/06/22 0944   BP: 118/72   Site: Left Upper Arm   Position: Sitting   Cuff Size: Large Adult   Pulse: 76   Weight: 167 lb (75.8 kg)   Height: 4' 11\" (1.499 m)       Physical Exam  Vitals and nursing note reviewed. Constitutional:       General: She is not in acute distress. Appearance: She is well-developed. She is not diaphoretic. HENT:      Head: Normocephalic and atraumatic. Right Ear: External ear normal.      Left Ear: External ear normal.      Nose: Nose normal.      Mouth/Throat:      Pharynx: No oropharyngeal exudate. Eyes:      General: No scleral icterus. Right eye: No discharge. Left eye: No discharge. Conjunctiva/sclera: Conjunctivae normal.      Pupils: Pupils are equal, round, and reactive to light. Neck:      Thyroid: No thyromegaly. Cardiovascular:      Rate and Rhythm: Normal rate and regular rhythm. Heart sounds: Normal heart sounds. No murmur heard. No friction rub. No gallop. Pulmonary:      Effort: Pulmonary effort is normal. No respiratory distress. Breath sounds: Normal breath sounds. No wheezing or rales. Chest:      Chest wall: No tenderness. Abdominal:      General: Bowel sounds are normal. There is no distension. Palpations: Abdomen is soft. Tenderness: There is no abdominal tenderness. There is no guarding or rebound. Musculoskeletal:         General: Tenderness present. Right shoulder: Normal.      Left shoulder: Normal.      Cervical back: Neck supple. Spasms, tenderness and bony tenderness present. No edema, erythema or rigidity. Pain with movement present. No muscular tenderness. Decreased range of motion. Lumbar back: Spasms, tenderness and bony tenderness present. Decreased range of motion. Back:       Right hip: Bony tenderness present. Left hip: Bony tenderness present. Decreased strength.       Right knee: Normal.      Left knee: Normal.   Skin:     General: Skin is warm. Coloration: Skin is not pale. Findings: No erythema or rash. Neurological:      Mental Status: She is alert and oriented to person, place, and time. She is not disoriented. Cranial Nerves: No cranial nerve deficit. Sensory: No sensory deficit. Motor: Weakness present. No atrophy or abnormal muscle tone. Coordination: Coordination normal.      Gait: Gait abnormal.      Deep Tendon Reflexes: Babinski sign absent on the right side. Reflex Scores:       Tricep reflexes are 1+ on the right side and 1+ on the left side. Bicep reflexes are 1+ on the right side and 1+ on the left side. Brachioradialis reflexes are 1+ on the right side and 1+ on the left side. Patellar reflexes are 1+ on the right side and 1+ on the left side. Achilles reflexes are 1+ on the right side and 1+ on the left side. Psychiatric:         Attention and Perception: Attention normal. She is attentive. Mood and Affect: Mood and affect normal. Mood is not anxious or depressed. Affect is not labile, blunt, angry or inappropriate. Speech: Speech normal. She is communicative. Speech is not rapid and pressured, delayed, slurred or tangential.         Behavior: Behavior normal. Behavior is not agitated, slowed, aggressive, withdrawn, hyperactive or combative. Behavior is cooperative. Thought Content: Thought content normal. Thought content is not paranoid or delusional. Thought content does not include homicidal or suicidal ideation. Thought content does not include homicidal or suicidal plan. Cognition and Memory: Cognition and memory normal. Memory is not impaired. She does not exhibit impaired recent memory or impaired remote memory. Judgment: Judgment normal. Judgment is not impulsive or inappropriate.       Comments: H/o depression      ERICA  Patricks test  positive  Yeoman's or Gaenslen's  positive  Kemps  positive  Spurlings  positive  Walker'sna         Assessment:     1. Spondylosis of lumbar region without myelopathy or radiculopathy    2. Spinal stenosis of lumbar region without neurogenic claudication    3. SI (sacroiliac) joint inflammation (HCC)    4. Chronic pain syndrome            Plan:      Patient read and signed orientation and opioid agreement if prescribing  OARRS reviewed. Current MED: 240 on Suboxone   Patient was not offered naloxone for home. Discussed long term side effects of medications, tolerance, dependency and addiction. UDS possibly preformed today if needed  Patient told can not receive any pain medications from any other source. No evidence of abuse, diversion or aberrant behavior. Medications and/or procedures to improve function and quality of life- patient understanding with this and that may not be pain free  Discussed possible weaning of medication dosing dependent on treatment/procedure results. Discussed with patient about safe storage of medications at home  Testing, Labs or Radiology Reviewed: Lumbar Cervical MRI   Procedures: Lumbar Facet MBB#1 @ L4-5,5-S1 bilateral   Discussed with patient about risks with procedure including infection, reaction to medication, increased pain, or bleeding. Medications:pt follows [facility]  If patient is on blood thinners will need approval to hold: yes or no:N/A  Does patient have implanted device Stimulator, AICD or Pacemaker etc? N/A          Meds. Prescribed:   No orders of the defined types were placed in this encounter. Return for Lumbar Facet MBB @L4-5, 5-S1 bilateral #1, Follow up after procedure.          Electronically signed by NATASHA Mueller CNP on 10/6/2022 at 10:40 AM

## 2022-10-06 NOTE — PROGRESS NOTES
[facility] [address]  Dept: [phone]  Dept Fax: [phone]: [phone]    Visit Date: 10/6/2022    Herve Mock is a 46 y.o. female who is referred for pain management evaluation and treatment per Dr. Yogesh Cardoso. CAGE and CAGE-AID Questions   1. In the last three months, have you felt you should cut down or stop drinking or using drugs? Yes []        No [x]     2. In the last three months, has anyone annoyed you or gotten on your nerves by telling you to cut down or stop drinking or using drugs? Yes []        No [x]     3. In the last three months, have you felt guilty or bad about how much you drink or use drugs? Yes []        No [x]     4. In the last three months, have you been waking up wanting to have an alcoholic drink or use drugs? Yes []        No [x]        Opioid Risk Tool:  Clinician Form       1. Family History of Substance Abuse: Female Male    Alcohol   []1   []3    Illegal drugs   []2   []3    Prescription drugs     []4   []4   2. Personal History of Substance Abuse:          Alcohol   []3   []3    Illegal drugs   []4   []4    Prescription drugs     [x]5   []5   3. Age (caro box if between 12 and 39):     []1   []1   4. History of Preadolescent Sexual Abuse:     []3   []0   5. Psychological Disease:      Attention deficit disorder, obsessive-compulsive disorder, bipolar, schizophrenia   []2   []2      Depression     [x]1   []1    Scoring Totals 6      Total Score  Low Risk  Moderate Risk  High Risk   Risk Category   0 - 3   4 - 7   8 or Above      Patient states symptoms interfere with:  A.  General Activity:  yes   B. Mood: yes    C. Walking Ability:   yes   D. Normal Work (Includes both work outside the home and housework):   yes    E.  Relations with Other People:  yes   F. Sleep:   yes   G.  Enjoyment of Life:  yes

## 2022-10-10 NOTE — PROGRESS NOTES
[facility]  Pulmonary medicine clinic follow up note. Patient: Anderson Cheney  : 1970      Chief complaint/Alutiiq:     Anderson Cheney is a 46 y.o. old female came for further evaluation regarding her 9 mm Right minor fissure pulmonary nodule noted on her CT scan of abdomen pelvis with contrast performed at [facility] in 2018 after having CT scan of chest with contrast and full PFTs before clinic visit. She was initially referred from NATASHA Díaz NP. She underwent chest CT scan on: Patient told me that she had a right lower lobe lung nodule diagnosed incidentally on a CT scan of abdomen and pelvis performed at [facility] in [place]. Patient never had a follow-up regarding this lung nodule. However, patient told me that she was advised to have a follow-up of this nodule. The above CT was performed at: [facility] in [place]. On today's questioning:  She admits to occasional cough with no expectoration. She denies hemoptysis. She denies fever or chills. She denies recent hospitalizations or emergency room visits. She is not using any inhalers     She denies recent loss of weight or appetite changes. She denies recent decline in functional status.     She was ever diagnosed with lung cancer:NO  She ever diagnosed with any extrapulmonary cancers I.e Breast,Colon etc:NO  She was ever diagnosed with COVID19 infection/Pneumonia:NO  She was ever diagnosed with Pneumonia:NO    She was ever diagnosed with following pulmonary diseases:  Bronchial asthma: NO  COPD:NO  Pulmonary fibrosis:NO  Sarcoidosis:NO  Pulmonary embolism: NO  History of DVT in the past: NO     Pulmonary hypertension:NO  Pleural effusion/s in the past:NO    She ever diagnosed with pulmonary tuberculosis in the past:NO  She ever recently exposed to any patients with tuberculosis:NO  She ever recently travelled to Citizen of [place] places of tuberculosis:NO  She ever tested for PPD: Negative in -she underwent PPD testing at a hospital facility in [place], [place]. She ever diagnosed with COVID-19 infection in the past:No.    She never received COVID-19 vaccines so far. She ever diagnosed with connective tissue diseases including Systemic lupus Erythematosus, Rheumatoid arthritis etc:NO  Any of her first-degree family relative ever diagnosed with Lung cancer: Her father  of lung cancer at the age of 54 years. Her uncle was diagnosed with lung cancer at the age of 45. years and he  from it. She ever exposed to radon, or uranium in the past: NO    She is currently using any oxygen supplementation at rest, exercise or during sleep/at night time:NO    She ever had a Colonoscopy performed: Never had colonoscopy so far. She is undergoing Cologuard screening by her family physician. She ever had a Mammogram performed: She is still waiting to have her first mammogram.  So far she never had a mammograms. Her previously scheduled mammogram was canceled due to family emergency. 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                  Social History     Tobacco Use    Smoking status: Every Day     Packs/day: 1.50     Years: 38.00     Pack years: 57.00     Types: Cigarettes     Start date: 1984    Smokeless tobacco: Never    Tobacco comments:     22 pts been down to have a back since 10/22   Substance Use Topics    Alcohol use: No    Drug use: No     She gave a history of chronic tobacco smoking for 38 years. She started smoking at the age of 15 years. She used to smoke 1 to 2 packs of cigarettes per day in the past occasionally. On average she smoked 1 pack of cigarettes per day. She still smoking 0.5 pack of cigarettes per day.       History of recreational or IV drug use in the past:NO  History of Alcohol use: No. History of exposure to coal mines/coal dust: NO  History of exposure to foundry dust/welding: NO  History of exposure to quarry/silica/sandblasting: NO  History of exposure to asbestos/working with breaks/ships: She was exposed to black mold and asbestos at her workplace in the past.  History of exposure to farm dust: NO  History of recent travel to long distances: NO  History of exposure to birds, pigeons, or chickens in the past:NO  Pet animals at home:Yes  Dogs: 0  Cats: 1    History of pulmonary embolism in the past: No            History of DVT in the past:No                        Review of Systems:   General/Constitutional: she gained 3lbs of weight from the last visit. No fever or chills. HENT: Negative. Eyes: Negative. Upper respiratory tract: Occasional nasal stuffiness with post nasal drip. She is currently not using any nasal spray  Lower respiratory tract/ lungs: See HPI  Cardiovascular: No palpitations or chest pain. Gastrointestinal: Occasional nausea with no vomiting. Neurological: No focal neurologiacal weakness. Extremities: No edema. Musculoskeletal: No complaints. Genitourinary: No complaints. Hematological: Negative. Psychiatric/Behavioral: Negative. Skin: No itching.       Current Medications:      Past Medical History:   Diagnosis Date    Abnormal Pap smear of cervix     Chronic back pain     Endometriosis     Hip pain     MRSA (methicillin resistant staph aureus) culture positive 5/21/2014    urine    Obesity 4/16/2015       Past Surgical History:   Procedure Laterality Date    BUNIONECTOMY      HEMORRHOID SURGERY      LEEP      PILONIDAL CYST DRAINAGE      TONSILLECTOMY      TUBAL LIGATION         Allergies   Allergen Reactions    Ibuprofen     Naproxen Rash       Current Outpatient Medications   Medication Sig Dispense Refill    citalopram (CELEXA) 10 MG tablet Take 10 mg by mouth daily      prazosin (MINIPRESS) 1 MG capsule Take 1 mg by mouth nightly buprenorphine-naloxone (SUBOXONE) 8-2 MG FILM SL film Place 1 Film under the tongue daily. No current facility-administered medications for this visit. Family History   Problem Relation Age of Onset    Lung Cancer Father     Arthritis Neg Hx     Asthma Neg Hx     Birth Defects Neg Hx     Cancer Neg Hx     Depression Neg Hx     Diabetes Neg Hx     Early Death Neg Hx     Hearing Loss Neg Hx     Heart Disease Neg Hx     High Blood Pressure Neg Hx     High Cholesterol Neg Hx     Kidney Disease Neg Hx     Learning Disabilities Neg Hx     Mental Illness Neg Hx     Mental Retardation Neg Hx     Miscarriages / Stillbirths Neg Hx     Stroke Neg Hx     Substance Abuse Neg Hx     Vision Loss Neg Hx     Other Neg Hx            Physical Exam     VITALS:  /68 (Site: Left Upper Arm, Position: Sitting, Cuff Size: Medium Adult)   Pulse 80   Temp 97.6 °F (36.4 °C)   Ht 4' 11\" (1.499 m)   Wt 170 lb 6.4 oz (77.3 kg)   SpO2 97% Comment: room air at rest  BMI 34.42 kg/m²   Nursing note and vitals reviewed. Constitutional: Patient appears moderately built and moderately nourished. No distress. Patient is oriented to person, place, and time. HENT:   Head: Normocephalic and atraumatic. Right Ear: External ear normal.   Left Ear: External ear normal.   Mouth/Throat: Oropharynx is clear and moist.  No oral thrush. Eyes: Conjunctivae are normal. Pupils are equal, round, and reactive to light. No scleral icterus. Neck: Neck supple. No JVD present. No tracheal deviation present. Cardiovascular: Normal rate, regular rhythm, normal heart sounds. No murmur heard. Pulmonary/Chest: Effort normal and breath sounds normal. No stridor. No respiratory distress. No wheezes. No rales. Patient exhibits no tenderness. Abdominal: Soft. Patient exhibits no distension. No tenderness. Musculoskeletal: Normal range of motion. Extremities: Patient exhibits no edema and no tenderness.    Lymphadenopathy:  No cervical adenopathy. Neurological: Patient is alert and oriented to person, place, and time. Skin: Skin is warm and dry. Patient is not diaphoretic. Psychiatric: Patient  has a normal mood and affect. Patient behavior is normal.         Diagnostic Data:    Radiological Data:    CT angiogram of chest with IV contrast performed on 2 July 2018          No radiological films were available for me to review. CT scan of abdomen pelvis with contrast performed on 15 April 2021: This was performed at [facility].                                  No radiological films were available for me to review. CT scan of abdomen pelvis with contrast performed at [facility] on 8 June 2018 in [place]- this report was obtained from patient smart phone through [id number] Official report:  Right minor fissure pulmonary nodule measuring up to 9 mm  Recommend follow-up per guidelines  Normal appendix  Small left ovarian/left adnexal cyst measuring 1.1 cm  Diffuse fatty infiltration of the liver  No hydronephrosis  Small midline fat-containing periumbilical hernia  Mild scattered chronic diverticulosis. Official radiological films were not available for me to review. CT Chest with Contrast: Performed on 26 October 2022. PROCEDURE: CT CHEST W CONTRAST   CLINICAL INFORMATION: Personal history of tobacco use, presenting hazards to health, Nodule of lower lobe of right lung   COMPARISON: CT chest 7/2/2018   1. A few stable sub-6 mm pulmonary nodules are seen bilaterally. Pulmonary function tests: Performed on 25 October 2022            Assessment:  -9 mm Right minor fissure pulmonary nodule noted on her CT scan of abdomen pelvis with contrast performed at [facility] in 2018-stable for the last 4 years consistent with right minor fissure infra fissural lymph node per radiologist  -Chronic tobacco smoking for 38 years. On average she smoked 1 pack of cigarettes per day.   She is still smoking 0.5 pack of cigarettes per day. -History of endometriosis  -PTSD on treatment medications. -Depression on treatment with medication. Recommendations/Plan:  -Patient educated to quit smoking as soon as possible. Patient verbalizes understanding of adverse consequences of tobacco smoking.  -Send Serum for Antinuclear antibody (CARMEN), Rheumatoid factor (RA)  -Fungal serologies  -Urine for Histoplasma Antigen  -Send C-ANCA and P-ANCA levels. -Send serum for Quantiferon gold test to evaluate for ? Latent TB infection.  -Will schedule patient for CT scan of chest without IV contrast in 2 years from now to follow her 5mm lung nodule in the right lower lobe due to her history of tobacco smoking. She did not want to go for a screening annual low-dose CT scan of chest-she is worried that her insurance may not benefit for the low-dose CT scan of chest.    -Cathi Carter was advised to make early appointment with my clinic if she develops any constitutional symptoms including loss of weight, poor appetite or hemoptysis. She verbalizes under standing.  - Schedule patient for follow up with my clinic in 2 to 3 months to go over the above lab work and in 2 years with recommended test/s CT scan of chest without IV contrast and spirometry before clinic visit. Patient advised to make early appointment if needed. - Patient educated about my impression and plan. Patient verbalizes understanding.      -I personally reviewed updated the Past medical hx, Past surgical hx,Social hx, Family hx, Medications, Allergies in the discrete data section of the patient chart along with labs, Pulmonary medicine,Sleep medicine related, Pathological, Microbiological and Radiological investigations.

## 2022-10-19 ENCOUNTER — TELEPHONE (OUTPATIENT)
Dept: PHYSICAL MEDICINE AND REHAB | Age: 52
End: 2022-10-19

## 2022-10-25 ENCOUNTER — HOSPITAL ENCOUNTER (OUTPATIENT)
Dept: PULMONOLOGY | Age: 52
Discharge: HOME | End: 2022-10-25
Payer: MEDICAID

## 2022-10-25 ENCOUNTER — HOSPITAL ENCOUNTER (OUTPATIENT)
Dept: CT IMAGING | Age: 52
Discharge: HOME | End: 2022-10-25
Payer: MEDICAID

## 2022-10-25 DIAGNOSIS — R91.1 NODULE OF LOWER LOBE OF RIGHT LUNG: ICD-10-CM

## 2022-10-25 DIAGNOSIS — Z87.891 PERSONAL HISTORY OF TOBACCO USE, PRESENTING HAZARDS TO HEALTH: ICD-10-CM

## 2022-10-25 PROCEDURE — 94726 PLETHYSMOGRAPHY LUNG VOLUMES: CPT

## 2022-10-25 PROCEDURE — 71260 CT THORAX DX C+: CPT

## 2022-10-25 PROCEDURE — 6360000004 HC RX CONTRAST MEDICATION: Performed by: INTERNAL MEDICINE

## 2022-10-25 PROCEDURE — 94010 BREATHING CAPACITY TEST: CPT

## 2022-10-25 PROCEDURE — 94729 DIFFUSING CAPACITY: CPT

## 2022-10-25 RX ADMIN — IOPAMIDOL 80 ML: 755 INJECTION, SOLUTION INTRAVENOUS at 13:45

## 2022-11-09 ENCOUNTER — NURSE ONLY (OUTPATIENT)
Dept: LAB | Age: 52
End: 2022-11-09

## 2022-11-09 ENCOUNTER — OFFICE VISIT (OUTPATIENT)
Dept: PULMONOLOGY | Age: 52
End: 2022-11-09
Payer: MEDICAID

## 2022-11-09 VITALS
TEMPERATURE: 97.6 F | HEIGHT: 59 IN | BODY MASS INDEX: 34.35 KG/M2 | DIASTOLIC BLOOD PRESSURE: 68 MMHG | WEIGHT: 170.4 LBS | OXYGEN SATURATION: 97 % | HEART RATE: 80 BPM | SYSTOLIC BLOOD PRESSURE: 108 MMHG

## 2022-11-09 DIAGNOSIS — Z87.891 PERSONAL HISTORY OF TOBACCO USE, PRESENTING HAZARDS TO HEALTH: ICD-10-CM

## 2022-11-09 DIAGNOSIS — R91.8 MULTIPLE LUNG NODULES ON CT: ICD-10-CM

## 2022-11-09 DIAGNOSIS — R91.1 NODULE OF LOWER LOBE OF RIGHT LUNG: ICD-10-CM

## 2022-11-09 DIAGNOSIS — Z87.891 PERSONAL HISTORY OF TOBACCO USE, PRESENTING HAZARDS TO HEALTH: Primary | ICD-10-CM

## 2022-11-09 LAB — RHEUMATOID FACTOR: < 10 IU/ML (ref 0–13)

## 2022-11-09 PROCEDURE — G8417 CALC BMI ABV UP PARAM F/U: HCPCS | Performed by: INTERNAL MEDICINE

## 2022-11-09 PROCEDURE — 4004F PT TOBACCO SCREEN RCVD TLK: CPT | Performed by: INTERNAL MEDICINE

## 2022-11-09 PROCEDURE — 3017F COLORECTAL CA SCREEN DOC REV: CPT | Performed by: INTERNAL MEDICINE

## 2022-11-09 PROCEDURE — G8427 DOCREV CUR MEDS BY ELIG CLIN: HCPCS | Performed by: INTERNAL MEDICINE

## 2022-11-09 PROCEDURE — 99214 OFFICE O/P EST MOD 30 MIN: CPT | Performed by: INTERNAL MEDICINE

## 2022-11-09 PROCEDURE — G8484 FLU IMMUNIZE NO ADMIN: HCPCS | Performed by: INTERNAL MEDICINE

## 2022-11-09 NOTE — PATIENT INSTRUCTIONS
Recommendations/Plan:  -Patient educated to quit smoking as soon as possible. Patient verbalizes understanding of adverse consequences of tobacco smoking.  -Send Serum for Antinuclear antibody (CARMEN), Rheumatoid factor (RA)  -Fungal serologies  -Urine for Histoplasma Antigen  -Send C-ANCA and P-ANCA levels. -Send serum for Quantiferon gold test to evaluate for ? Latent TB infection.  -Will schedule patient for CT scan of chest without IV contrast in 2 years from now to follow her 5mm lung nodule in the right lower lobe due to her history of tobacco smoking. She did not want to go for a screening annual low-dose CT scan of chest-she is worried that her insurance may not benefit for the low-dose CT scan of chest.    -Jo Ann Espinal was advised to make early appointment with my clinic if she develops any constitutional symptoms including loss of weight, poor appetite or hemoptysis. She verbalizes under standing.  - Schedule patient for follow up with my clinic in 2 to 3 months to go over the above lab work and in 2 years with recommended test/s CT scan of chest without IV contrast and spirometry before clinic visit. Patient advised to make early appointment if needed. - Patient educated about my impression and plan. Patient verbalizes understanding.

## 2022-11-11 LAB
ANA SCREEN: NORMAL
HISTOPLASMA ANTIGEN URINE: NOT DETECTED
HISTOPLASMA ANTIGEN URINE: NOT DETECTED NG/ML

## 2022-11-12 LAB
QUANTI TB GOLD PLUS: NEGATIVE
QUANTI TB1 MINUS NIL: 0 IU/ML (ref 0–0.34)
QUANTI TB2 MINUS NIL: 0.01 IU/ML (ref 0–0.34)
QUANTIFERON MITOGEN MINUS NIL: > 10 IU/ML
QUANTIFERON NIL: 0.03 IU/ML

## 2022-11-13 LAB
ANCA IFA PATTERN: NORMAL
ANCA IFA TITER: NORMAL
ASPERGILLUS ANTIBODY CF: NORMAL
BLASTOMYCES ANTIBODY CF: 0.6 IV
COCCIDIODES AB CF: NORMAL
HISTOPLASMA ANTIBODY MYCELIAL CF: NORMAL
HISTOPLASMA ANTIBODY YEAST CF: NORMAL

## 2022-11-14 NOTE — PROGRESS NOTES
[facility]  Pulmonary medicine clinic initial consult note. Patient: Loreto Watson  : 1970      Chief complaint/Cachil DeHe:     Loreto Watson is a 46 y.o. old female came for further evaluation regarding her lung nodule/s with referral from NATASHA Salcedo NP. She underwent chest CT scan on: Patient told me that she had a right lower lobe lung nodule diagnosed incidentally on a CT scan of abdomen and pelvis performed at [facility] in [place]. Patient never had a follow-up regarding this lung nodule. However, patient told me that she was advised to have a follow-up of this nodule. The above CT was performed at: [facility] in [place]. She was ever seen by a pulmonologist in the past:NO  She was ever diagnosed with lung nodule in the past: Yes  She was ever diagnosed with lung cancer:NO  She ever diagnosed with any extrapulmonary cancers I.e Breast,Colon etc:NO  She was ever diagnosed with COVID19 infection/Pneumonia:NO  She was ever diagnosed with Pneumonia:NO    She was ever diagnosed with following pulmonary diseases:  Bronchial asthma: NO  COPD:NO  Pulmonary fibrosis:NO  Sarcoidosis:NO  Pulmonary embolism: NO  History of DVT in the past: NO     Pulmonary hypertension:NO  Pleural effusion/s in the past:NO    She ever diagnosed with pulmonary tuberculosis in the past:NO  She ever recently exposed to any patients with tuberculosis:NO  She ever recently travelled to endemic places of tuberculosis:NO  She ever tested for PPD: Negative in -she underwent PPD testing at a hospital facility in [place], [place]. She ever diagnosed with COVID-19 infection in the past:No.    She never received COVID-19 vaccines so far.     She ever diagnosed with connective tissue diseases including Systemic lupus Erythematosus, Rheumatoid arthritis etc:NO  Any of her first-degree family relative ever diagnosed with Spoke to pt. Pt stated she was going to call back to clinic to scheduled. Attempted to contact pt to scheduled, no answer, Vmail with callback # provided    Review of Systems:   General/Constitutional: She gained approximately 20 pounds of weight in the last 1 year with a normal appetite. No fever or chills. HENT: Negative. Eyes: Negative. Upper respiratory tract: Occasional nasal stuffiness with post nasal drip. Lower respiratory tract/ lungs: Occasional cough with light yellow sputum production. No hemoptysis. Cardiovascular: No palpitations or chest pain. Gastrointestinal: Occasional nausea with no vomiting. Neurological: No focal neurologiacal weakness. Extremities: No edema. Musculoskeletal: No complaints. Genitourinary: No complaints. Hematological: Negative. Psychiatric/Behavioral: Negative. Skin: No itching. Current Medications:      Past Medical History:   Diagnosis Date    Abnormal Pap smear of cervix     Chronic back pain     Endometriosis     Hip pain     MRSA (methicillin resistant staph aureus) culture positive 5/21/2014    urine    Obesity 4/16/2015       Past Surgical History:   Procedure Laterality Date    BUNIONECTOMY      HEMORRHOID SURGERY      LEEP      PILONIDAL CYST DRAINAGE      TONSILLECTOMY      TUBAL LIGATION         Allergies   Allergen Reactions    Ibuprofen     Naproxen Rash       Current Outpatient Medications   Medication Sig Dispense Refill    citalopram (CELEXA) 10 MG tablet Take 10 mg by mouth daily      prazosin (MINIPRESS) 1 MG capsule Take 1 mg by mouth nightly      buprenorphine-naloxone (SUBOXONE) 8-2 MG FILM SL film Place 1 Film under the tongue daily. No current facility-administered medications for this visit.        Family History   Problem Relation Age of Onset    Lung Cancer Father     Arthritis Neg Hx     Asthma Neg Hx     Birth Defects Neg Hx     Cancer Neg Hx     Depression Neg Hx     Diabetes Neg Hx     Early Death Neg Hx     Hearing Loss Neg Hx     Heart Disease Neg Hx     High Blood Pressure Neg Hx     High Cholesterol Neg Hx     Kidney Disease Neg Hx     Learning Disabilities Neg Hx     Mental Illness Neg Hx     Mental Retardation Neg Hx     Miscarriages / Stillbirths Neg Hx     Stroke Neg Hx     Substance Abuse Neg Hx     Vision Loss Neg Hx     Other Neg Hx            Physical Exam     VITALS:  /70   Pulse 77   Temp 97.4 °F (36.3 °C)   Ht 4' 11\" (1.499 m)   Wt 167 lb (75.8 kg)   SpO2 98% Comment: r/a  BMI 33.73 kg/m²   Nursing note and vitals reviewed. Constitutional: Patient appears moderately built and moderately nourished. No distress. Patient is oriented to person, place, and time. HENT:   Head: Normocephalic and atraumatic. Right Ear: External ear normal.   Left Ear: External ear normal.   Mouth/Throat: Oropharynx is clear and moist.  No oral thrush. Eyes: Conjunctivae are normal. Pupils are equal, round, and reactive to light. No scleral icterus. Neck: Neck supple. No JVD present. No tracheal deviation present. Cardiovascular: Normal rate, regular rhythm, normal heart sounds. No murmur heard. Pulmonary/Chest: Effort normal and breath sounds normal. No stridor. No respiratory distress. No wheezes. No rales. Patient exhibits no tenderness. Abdominal: Soft. Patient exhibits no distension. No tenderness. Musculoskeletal: Normal range of motion. Extremities: Patient exhibits no edema and no tenderness. Lymphadenopathy:  No cervical adenopathy. Neurological: Patient is alert and oriented to person, place, and time. Skin: Skin is warm and dry. Patient is not diaphoretic. Psychiatric: Patient  has a normal mood and affect. Patient behavior is normal.       Diagnostic Data:    Radiological Data:    CT angiogram of chest with IV contrast performed on 2 July 2018          No radiological films were available for me to review. CT scan of abdomen pelvis with contrast performed on 15 April 2021: This was performed at [facility].                                  No radiological films were available for me to review.     CT scan of abdomen pelvis with contrast performed at [facility] on 8 June 2018 in [place]- this report was obtained from patient smart phone through [address] Official report:  Right minor fissure pulmonary nodule measuring up to 9 mm  Recommend follow-up per guidelines  Normal appendix  Small left ovarian/left adnexal cyst measuring 1.1 cm  Diffuse fatty infiltration of the liver  No hydronephrosis  Small midline fat-containing periumbilical hernia  Mild scattered chronic diverticulosis. Official radiological films were not available for me to review. Pulmonary function tests:  None in epic      Assessment:  -9 mm Right minor fissure pulmonary nodule noted on her CT scan of abdomen pelvis with contrast performed at [facility] in 2018-need follow-up.  -Chronic tobacco smoking for 38 years. She started smoking at the age of 15 years. She used to smoke 1 to 2 packs of cigarettes per day in the past occasionally. On average she smoked 1 pack of cigarettes per day. She still smoking 0.5 to 1 pack of cigarettes per day. -History of endometriosis  -PTSD on treatment medications. -Depression on treatment with medication. Recommendations/Plan:  -Patient educated to quit smoking as soon as possible. Patient verbalizes understanding of adverse consequences of tobacco smoking.  -The patient advised to obtain CTA scan of chest films from [facility] performed on 2 July 2018 and a CT and submit to [address] [id number] radiology department to load into PACS system for future comparison.  -Patient advised to obtain her CT scan of her abdomen pelvis official radiological report performed in 2018 from [facility] in [address] along with the films on a CD to submit to [facility] radiological department to load into PACS system for future comparisons.   Patient verbalized understanding.  - Schedule patient for CT scan of chest with IV contrast in 1 to 2 weeks to evaluate her lung fields to follow her ?right lower lobe lung nodule noted on her a CT scan of chest performed in 2018 at [facility] in [place]. The radiological report or films after the above CT scan of chest were not available for me to review or confirm this diagnosis of right lower lobe lung nodule.   -Schedule patient for full pulmonary function tests before clinic visit.  -Leisa Plummer was advised to make early appointment with my clinic if she develops any constitutional symptoms including loss of weight, poor appetite or hemoptysis. She verbalizes under standing.  - Schedule patient for follow up with my clinic in 1 to 2weeks with recommended test/s CT scan of chest with IV contrast and full PFTs. Patient advised to make early appointment if needed. - Patient educated about my impression and plan. Patient verbalizes understanding.      -I personally reviewed updated the Past medical hx, Past surgical hx,Social hx, Family hx, Medications, Allergies in the discrete data section of the patient chart along with labs, Pulmonary medicine,Sleep medicine related, Pathological, Microbiological and Radiological investigations.

## 2022-11-16 ENCOUNTER — NURSE ONLY (OUTPATIENT)
Dept: LAB | Age: 52
End: 2022-11-16

## 2022-11-22 ENCOUNTER — TELEPHONE (OUTPATIENT)
Dept: PHYSICAL MEDICINE AND REHAB | Age: 52
End: 2022-11-22

## 2022-11-23 NOTE — TELEPHONE ENCOUNTER
Pt. Called the office. She went to [facility] for an appointment. Dr. Justin Yin ()  is concerned about her being on the Suboxone and the sedation. She wont fill her Suboxone until we verify that we are not giving any pain medications with the anesthesia.   Please advise, Thanks

## 2022-11-23 NOTE — TELEPHONE ENCOUNTER
Spoke with Harpreet Barajas RN-[facility]. Informed of procedure and that Dr. Tonia Moreno will only be using Propofol for procedure. Verbalized understanding and she will reach out to pt.

## 2022-12-01 ENCOUNTER — PREP FOR PROCEDURE (OUTPATIENT)
Dept: PHYSICAL MEDICINE AND REHAB | Age: 52
End: 2022-12-01

## 2022-12-02 NOTE — DISCHARGE INSTRUCTIONS
ANESTHESIA INSTRUCTIONS FOLLOWING SURGERY        Since you may experience some intermittent light-headedness for the next several hours, we suggest you plan on bed rest or quiet relaxation this evening. You must have a friend or relative stay with you tonight. Because of the sedation you have received, it is recommended that you do not drive a motor vehicle, operate any kind of machinery, or sign any contractual agreement for 24 hours following the procedure. You should not take alcoholic beverages tonight and only take sleeping medication that has been specifically prescribed for you by your physician. Call office [phone] if you have:  Temperature greater than 100.4  Persistent nausea and vomiting  Severe uncontrolled pain  Redness, tenderness, or signs of infection (pain, swelling, redness, odor or green/yellow discharge around the site)  Difficulty breathing, headache or visual disturbances  Hives  Persistent dizziness or light-headedness  Extreme fatigue  Any other questions or concerns you may have after discharge    In an emergency, call 911 or go to an Emergency Department at a nearby hospital    It is important to bring a complete, current list of your medications to any medical appointments or hospitalizations. REMINDER:   Carry a list of your medications and allergies with you at all times  Call your pharmacy at least 1 week in advance to refill prescriptions    Diet: Resume your usual diet. Good nutrition promotes healing. Increase fluid intake. Activity: Rest for 24 hrs then resume normal activity. HOME MEDICATIONS:      If on blood thinning products such as; Aspirin, NSAIDS, Plavix, Coumadin, Xarelto, Fish Oil, Multi-Vitamins or Herbal Supplements restart in 24 hours      Restart Metformin in 48 hours if you had procedure with dye. Restart Metformin in 24 hours if no dye used during procedure.         Education Materials Received: {yes/no:901759}  Belongings Returned: {yes/no:825385}          I understand and acknowledge receipt of the above instructions. Patient or Guardian Signature                                                         Date/Time                                                                                                                                            Physician's or R.N.'s Signature                                                                  Date/Time      The discharge instructions have been reviewed with the patient and/or Guardian. Patient and/or Guardian signed and retained a printed copy. Call office [phone] if you have:  Temperature greater than 100.4  Persistent nausea and vomiting  Severe uncontrolled pain  Redness, tenderness, or signs of infection (pain, swelling, redness, odor or green/yellow discharge around the site)  Difficulty breathing, headache or visual disturbances  Hives  Persistent dizziness or light-headedness  Extreme fatigue  Any other questions or concerns you may have after discharge    In an emergency, call 911 or go to an Emergency Department at a nearby hospital    It is important to bring a complete, current list of your medications to any medical appointments or hospitalizations. REMINDER:   Carry a list of your medications and allergies with you at all times  Call your pharmacy at least 1 week in advance to refill prescriptions    Diet: Resume your usual diet. Good nutrition promotes healing. Increase fluid intake. Activity: Rest for 24 hrs then resume normal activity. Education Materials Received: {yes/no:813010}  Belongings Returned: {yes/no:139610}          I understand and acknowledge receipt of the above instructions. Patient or Guardian Signature                                                         Date/Time                                                                                                                                            Physician's or R.N.'s Signature                                                                  Date/Time      The discharge instructions have been reviewed with the patient and/or Guardian. Patient and/or Guardian signed and retained a printed copy.

## 2022-12-06 ENCOUNTER — ANESTHESIA (OUTPATIENT)
Dept: OPERATING ROOM | Age: 52
End: 2022-12-06
Payer: MEDICAID

## 2022-12-06 ENCOUNTER — ANESTHESIA EVENT (OUTPATIENT)
Dept: OPERATING ROOM | Age: 52
End: 2022-12-06
Payer: MEDICAID

## 2022-12-06 ENCOUNTER — HOSPITAL ENCOUNTER (OUTPATIENT)
Age: 52
Setting detail: OUTPATIENT SURGERY
Discharge: HOME | End: 2022-12-06
Attending: PAIN MEDICINE | Admitting: PAIN MEDICINE
Payer: MEDICAID

## 2022-12-06 ENCOUNTER — APPOINTMENT (OUTPATIENT)
Dept: GENERAL RADIOLOGY | Age: 52
End: 2022-12-06
Attending: PAIN MEDICINE
Payer: MEDICAID

## 2022-12-06 VITALS
BODY MASS INDEX: 34.31 KG/M2 | HEIGHT: 59 IN | HEART RATE: 79 BPM | TEMPERATURE: 96.9 F | SYSTOLIC BLOOD PRESSURE: 107 MMHG | RESPIRATION RATE: 16 BRPM | DIASTOLIC BLOOD PRESSURE: 50 MMHG | WEIGHT: 170.2 LBS | OXYGEN SATURATION: 93 %

## 2022-12-06 PROBLEM — M47.816 LUMBAR SPONDYLOSIS: Status: ACTIVE | Noted: 2022-12-06

## 2022-12-06 PROCEDURE — 2500000003 HC RX 250 WO HCPCS: Performed by: PAIN MEDICINE

## 2022-12-06 PROCEDURE — 3600000057 HC PAIN LEVEL 4 ADDL 15 MIN: Performed by: PAIN MEDICINE

## 2022-12-06 PROCEDURE — 3209999900 FLUORO FOR SURGICAL PROCEDURES

## 2022-12-06 PROCEDURE — 6360000002 HC RX W HCPCS: Performed by: NURSE ANESTHETIST, CERTIFIED REGISTERED

## 2022-12-06 PROCEDURE — 64494 INJ PARAVERT F JNT L/S 2 LEV: CPT | Performed by: PAIN MEDICINE

## 2022-12-06 PROCEDURE — 3700000000 HC ANESTHESIA ATTENDED CARE: Performed by: PAIN MEDICINE

## 2022-12-06 PROCEDURE — 6360000002 HC RX W HCPCS: Performed by: PAIN MEDICINE

## 2022-12-06 PROCEDURE — 3700000001 HC ADD 15 MINUTES (ANESTHESIA): Performed by: PAIN MEDICINE

## 2022-12-06 PROCEDURE — 7100000010 HC PHASE II RECOVERY - FIRST 15 MIN: Performed by: PAIN MEDICINE

## 2022-12-06 PROCEDURE — 7100000011 HC PHASE II RECOVERY - ADDTL 15 MIN: Performed by: PAIN MEDICINE

## 2022-12-06 PROCEDURE — 2709999900 HC NON-CHARGEABLE SUPPLY: Performed by: PAIN MEDICINE

## 2022-12-06 PROCEDURE — 64493 INJ PARAVERT F JNT L/S 1 LEV: CPT | Performed by: PAIN MEDICINE

## 2022-12-06 PROCEDURE — 3600000056 HC PAIN LEVEL 4 BASE: Performed by: PAIN MEDICINE

## 2022-12-06 PROCEDURE — 2500000003 HC RX 250 WO HCPCS: Performed by: NURSE ANESTHETIST, CERTIFIED REGISTERED

## 2022-12-06 RX ORDER — PROPOFOL 10 MG/ML
INJECTION, EMULSION INTRAVENOUS PRN
Status: DISCONTINUED | OUTPATIENT
Start: 2022-12-06 | End: 2022-12-06 | Stop reason: SDUPTHER

## 2022-12-06 RX ORDER — LIDOCAINE HYDROCHLORIDE 10 MG/ML
INJECTION, SOLUTION INFILTRATION; PERINEURAL PRN
Status: DISCONTINUED | OUTPATIENT
Start: 2022-12-06 | End: 2022-12-06 | Stop reason: ALTCHOICE

## 2022-12-06 RX ORDER — BUPIVACAINE HYDROCHLORIDE 2.5 MG/ML
INJECTION, SOLUTION EPIDURAL; INFILTRATION; INTRACAUDAL PRN
Status: DISCONTINUED | OUTPATIENT
Start: 2022-12-06 | End: 2022-12-06 | Stop reason: ALTCHOICE

## 2022-12-06 RX ORDER — LIDOCAINE HYDROCHLORIDE 10 MG/ML
INJECTION, SOLUTION INFILTRATION; PERINEURAL PRN
Status: DISCONTINUED | OUTPATIENT
Start: 2022-12-06 | End: 2022-12-06 | Stop reason: SDUPTHER

## 2022-12-06 RX ORDER — METHYLPREDNISOLONE ACETATE 80 MG/ML
INJECTION, SUSPENSION INTRA-ARTICULAR; INTRALESIONAL; INTRAMUSCULAR; SOFT TISSUE PRN
Status: DISCONTINUED | OUTPATIENT
Start: 2022-12-06 | End: 2022-12-06 | Stop reason: ALTCHOICE

## 2022-12-06 RX ADMIN — LIDOCAINE HYDROCHLORIDE 50 MG: 10 INJECTION, SOLUTION INFILTRATION; PERINEURAL at 08:19

## 2022-12-06 RX ADMIN — PROPOFOL 50 MG: 10 INJECTION, EMULSION INTRAVENOUS at 08:19

## 2022-12-06 RX ADMIN — PROPOFOL 50 MG: 10 INJECTION, EMULSION INTRAVENOUS at 08:24

## 2022-12-06 ASSESSMENT — PAIN - FUNCTIONAL ASSESSMENT: PAIN_FUNCTIONAL_ASSESSMENT: 0-10

## 2022-12-06 ASSESSMENT — PAIN DESCRIPTION - DESCRIPTORS: DESCRIPTORS: ACHING

## 2022-12-06 ASSESSMENT — PAIN SCALES - GENERAL: PAINLEVEL_OUTOF10: 0

## 2022-12-06 NOTE — ANESTHESIA PRE PROCEDURE
Department of Anesthesiology  Preprocedure Note       Name:  Juana Machuca   Age:  46 y.o.  :  1970                                          MRN:  [id number]         Date:  2022      Surgeon: Eliud Sauer):  Radha Lloyd MD    Procedure: Procedure(s):  Lumbar Facet Medial branch block Lumbar 4-5, 5-Sacral 1 bilateral    Medications prior to admission:   Prior to Admission medications    Medication Sig Start Date End Date Taking? Authorizing Provider   citalopram (CELEXA) 10 MG tablet Take 10 mg by mouth daily    Historical Provider, MD   prazosin (MINIPRESS) 1 MG capsule Take 1 mg by mouth nightly    Historical Provider, MD   buprenorphine-naloxone (SUBOXONE) 8-2 MG FILM SL film Place 1 Film under the tongue daily. Historical Provider, MD       Current medications:    No current facility-administered medications for this encounter. Allergies:     Allergies   Allergen Reactions    Ibuprofen     Naproxen Rash       Problem List:    Patient Active Problem List   Diagnosis Code    History of MRSA infection Z86.14    Abscess L02.91    Abnormal Pap smear of cervix R87.619    MRSA (methicillin resistant Staphylococcus aureus) infection UTI A49.02    Hidradenitis suppurativa L73.2    Pain in right hip M25.551    Obesity E66.9    Cervical spinal stenosis M48.02    Intractable episodic tension-type headache G44.211       Past Medical History:        Diagnosis Date    Abnormal Pap smear of cervix     Chronic back pain     Endometriosis     Hip pain     MRSA (methicillin resistant staph aureus) culture positive 2014    urine    Obesity 2015       Past Surgical History:        Procedure Laterality Date    BUNIONECTOMY      HEMORRHOID SURGERY      LEEP      PILONIDAL CYST DRAINAGE      TONSILLECTOMY      TUBAL LIGATION         Social History:    Social History     Tobacco Use    Smoking status: Every Day     Packs/day: 0.50     Years: 38.00     Pack years: 19.00 Types: Cigarettes     Start date: 1/1/1984    Smokeless tobacco: Never   Substance Use Topics    Alcohol use: No                                Ready to quit: Not Answered  Counseling given: Not Answered      Vital Signs (Current):   Vitals:    12/06/22 0701   BP: 88/63   Pulse: 77   Resp: 16   Temp: 97 °F (36.1 °C)   TempSrc: Temporal   SpO2: 95%   Weight: 170 lb 3.2 oz (77.2 kg)   Height: 4' 11\" (1.499 m)                                              BP Readings from Last 3 Encounters:   12/06/22 88/63   11/09/22 108/68   10/06/22 118/72       NPO Status: Time of last liquid consumption: 1900                        Time of last solid consumption: 1900                        Date of last liquid consumption: 12/05/22                        Date of last solid food consumption: 12/05/22    BMI:   Wt Readings from Last 3 Encounters:   12/06/22 170 lb 3.2 oz (77.2 kg)   11/09/22 170 lb 6.4 oz (77.3 kg)   10/06/22 167 lb (75.8 kg)     Body mass index is 34.38 kg/m². CBC:   Lab Results   Component Value Date/Time    WBC 9.4 09/20/2022 04:22 PM    RBC 5.55 09/20/2022 04:22 PM    HGB 15.7 09/20/2022 04:22 PM    HCT 49.8 09/20/2022 04:22 PM    MCV 89.7 09/20/2022 04:22 PM    RDW 12.2 09/20/2022 04:22 PM     09/20/2022 04:22 PM       CMP:   Lab Results   Component Value Date/Time     09/20/2022 04:22 PM    K 4.2 09/20/2022 04:22 PM     09/20/2022 04:22 PM    CO2 28 09/20/2022 04:22 PM    BUN 15 09/20/2022 04:22 PM    CREATININE 0.90 09/20/2022 04:22 PM    GFRAA >60 09/20/2022 04:22 PM    LABGLOM >60 09/20/2022 04:22 PM    LABGLOM 77 03/07/2017 03:12 PM    GLUCOSE 83 09/20/2022 04:22 PM    PROT 7.6 09/20/2022 04:22 PM    CALCIUM 9.9 09/20/2022 04:22 PM    BILITOT 0.3 09/20/2022 04:22 PM    ALKPHOS 67 09/20/2022 04:22 PM    AST 19 09/20/2022 04:22 PM    ALT 21 09/20/2022 04:22 PM       POC Tests: No results for input(s): POCGLU, POCNA, POCK, POCCL, POCBUN, POCHEMO, POCHCT in the last 72 hours.     Coags: Lab Results   Component Value Date/Time    PROTIME 9.4 06/05/2014 12:06 PM    INR 0.9 06/05/2014 12:06 PM    APTT 24.4 06/05/2014 12:06 PM       HCG (If Applicable):   Lab Results   Component Value Date    PREGTESTUR negative 10/16/2015    HCG NEGATIVE 10/16/2015        ABGs: No results found for: PHART, PO2ART, EFC2YGW, WKL9HFL, BEART, T8NCCDII     Type & Screen (If Applicable):  No results found for: LABABO, LABRH    Drug/Infectious Status (If Applicable):  No results found for: HIV, HEPCAB    COVID-19 Screening (If Applicable): No results found for: COVID19        Anesthesia Evaluation    Airway: Mallampati: II          Dental:          Pulmonary:       (-) COPD                           Cardiovascular:                      Neuro/Psych:               GI/Hepatic/Renal:             Endo/Other:                     Abdominal:             Vascular: Other Findings:           Anesthesia Plan      MAC     ASA 2             Anesthetic plan and risks discussed with patient.                         Shruti Hernandez MD   12/6/2022

## 2022-12-06 NOTE — PROGRESS NOTES
0931 Patient to phase 2 from surgery. Report from [facility]. Patient resting comfortably in bed. Opens eyes to name. Vitals assessed stable on room air. Will leave pulse ox on until patient wakes up more. 1713 Patient alert, oriented, appropriate. Denies any pain at this time. 6340 IV taken out and patient getting dressed at this time. Patient states that her friend who came with her will have to call a cab for both of them to go. Azra Luna, friend, calling a cab and will let us know when it is here. 2890 Patient sitting in chair, waiting for . Ice pack given for patient comfort. 3451 Patient taken to discharge doors via wheelchair. Discharged with AVS and all belongings.

## 2022-12-06 NOTE — H&P
[facility]  History and Physical Update    Pt Name: Karen Zambrano  MRN: [id number]  YOB: 1970  Date of evaluation: 12/6/2022      I have examined the patient and reviewed the H&P/Consult and there are no changes to the patient or plans.         Electronically signed by Connie Palma MD on 12/6/2022 at 8:15 AM

## 2022-12-06 NOTE — H&P
H&P    New pt here for c.o low back pain mostly all axial  prior but more recently  pain radiates down back of  buttocks thigh into Left Si and LLE  stops above knee. She has had low back pain for  a few years. She works as  and [address] work. She has had a fall but many years prior to  pain starting. Denies any other injuries falls or trauma. She went to see Dr Asim Mullen this year in Spring 2022 due to increased low back pain. She had PT no relief. She  denies any prior lumbar surgery or injections in the past.      She  has h/o of opoid abuse, she is on Suboxone with [facility] in [place]         She has constant low back mostly axial facet mediated aching pain 80-90% of the time with walking standing bending, the pain increases  to more severe  sharp stabbing with increased activity and long term walking standing cleaning cooking  and working daily ADLs . She has some  pinching  numbness tingling pain  in left SI  left buttock down LLE at times. 10-20%  She has back stiffness with spasms. She has LLE weakness at times with long term standing walking. She has limited ROM, antalgic gait. She denies any bowel or baldder dysfunction. Patient pain increases with bending, lifting, twisting , reaching, pushing, pulling, walking, standing, stairs, getting up and down, and housework or working at job. Treatments tried PT/HEP, ICE/HEAT, NSAIDS, narcotics, muscle relaxer, OTC rubs creams patches, and massage or TPI  Pain description sharp, shooting, stabbing/jabbing, burning, aching, numbness/tingling, and pins and needles  Pain rating  scale 1-10 highest  7  lowest  3  average   5     Radiology:  FINDINGS:   There is anatomic vertebral body height and alignment. There are couple of foci of hyperintense T1 and T2 signal in the T12 and L4 vertebral bodies as evidence for hemangiomas. Marrow signal is otherwise within normal limits.  The conus terminates in a    normal fashion at the L1 level. The cauda equina is normal in appearance without nerve root thickening or clumping identified. Paraspinal soft tissues are unremarkable. At T12-L1 through L3-4 intervertebral discs are normal in appearance. There is no significant spinal canal or neuroforaminal stenosis. At L4-5 there is a minimal disc bulge which contributes to mild spinal canal stenosis and mild bilateral foraminal stenosis in association with facet hypertrophy and ligament flavum thickening. At L5-S1 there is no significant spinal canal or neuroforaminal stenosis. Impression    Mild degenerative changes at L4-5 where a minimal disc bulge and facet hypertrophy and ligamentum flavum thickening cause mild spinal canal stenosis and mild bilateral neural foraminal stenosis. FINDINGS:   There is anatomic vertebral body height and alignment. Marrow signal is within normal limits. The craniocervical junction appears intact. The cervical spinal cord is normal in caliber without focal area of abnormal T2 signal. Paraspinal soft tissues are    unremarkable. At C2-3 there is no significant spinal canal or neuroforaminal stenosis. At C3-4 there is mild uncovertebral joint degenerative change on the right which minimally narrows the right lateral recess. No significant neural foraminal stenosis is present. At C4-5 there is no significant spinal canal stenosis. There is mild left and moderate right neuroforaminal stenosis in association with uncovertebral joint degenerative change. At C5-6 there is no significant spinal canal or neuroforaminal stenosis. At C6-7 there is a minimal disc bulge which contributes to mild spinal canal stenosis in association with posterior ligament flavum thickening. There is mild bilateral foraminal stenosis in association with mild uncovertebral joint degenerative change. At C7-T1 there is no significant spinal canal or neuroforaminal stenosis.            Impression    Overall mild degenerative changes of the cervical spine more focally pronounced at C4-5 where there is no significant neural canal stenosis but mild left and moderate right neural foraminal stenosis in association with uncovertebral joint degenerative    change. The patient is allergic to ibuprofen and naproxen. Subjective:      Review of Systems   Constitutional:  Positive for activity change. Negative for appetite change, chills, diaphoresis, fatigue, fever and unexpected weight change. HENT:  Negative for congestion, ear pain, hearing loss, mouth sores, nosebleeds, rhinorrhea, sinus pressure and sore throat. Eyes:  Negative for photophobia, pain and visual disturbance. Respiratory:  Negative for cough, chest tightness, shortness of breath and wheezing. Pending PFT  Nodule on right lung   Cardiovascular:  Negative for chest pain and palpitations. Gastrointestinal:  Negative for abdominal pain, constipation, diarrhea, nausea and vomiting. Endocrine: Negative for cold intolerance, heat intolerance, polydipsia, polyphagia and polyuria. Genitourinary:  Negative for decreased urine volume, difficulty urinating, frequency and hematuria. Musculoskeletal:  Positive for arthralgias, back pain, gait problem and myalgias. Negative for joint swelling, neck pain and neck stiffness. Skin:  Negative for color change and rash. Allergic/Immunologic: Negative for food allergies and immunocompromised state. Neurological:  Negative for dizziness, tremors, seizures, syncope, facial asymmetry, speech difficulty, weakness, light-headedness, numbness and headaches. Hematological:  Does not bruise/bleed easily. Psychiatric/Behavioral:  Negative for agitation, behavioral problems, confusion, decreased concentration, dysphoric mood, hallucinations, self-injury, sleep disturbance and suicidal ideas. The patient is nervous/anxious. The patient is not hyperactive.          Depression Objective:      Vitals       Vitals:     10/06/22 0944   BP: 118/72   Site: Left Upper Arm   Position: Sitting   Cuff Size: Large Adult   Pulse: 76   Weight: 167 lb (75.8 kg)   Height: 4' 11\" (1.499 m)            Physical Exam  Vitals and nursing note reviewed. Constitutional:       General: She is not in acute distress. Appearance: She is well-developed. She is not diaphoretic. HENT:      Head: Normocephalic and atraumatic. Right Ear: External ear normal.      Left Ear: External ear normal.      Nose: Nose normal.      Mouth/Throat:      Pharynx: No oropharyngeal exudate. Eyes:      General: No scleral icterus. Right eye: No discharge. Left eye: No discharge. Conjunctiva/sclera: Conjunctivae normal.      Pupils: Pupils are equal, round, and reactive to light. Neck:      Thyroid: No thyromegaly. Cardiovascular:      Rate and Rhythm: Normal rate and regular rhythm. Heart sounds: Normal heart sounds. No murmur heard. No friction rub. No gallop. Pulmonary:      Effort: Pulmonary effort is normal. No respiratory distress. Breath sounds: Normal breath sounds. No wheezing or rales. Chest:      Chest wall: No tenderness. Abdominal:      General: Bowel sounds are normal. There is no distension. Palpations: Abdomen is soft. Tenderness: There is no abdominal tenderness. There is no guarding or rebound. Musculoskeletal:         General: Tenderness present. Right shoulder: Normal.      Left shoulder: Normal.      Cervical back: Neck supple. Spasms, tenderness and bony tenderness present. No edema, erythema or rigidity. Pain with movement present. No muscular tenderness. Decreased range of motion. Lumbar back: Spasms, tenderness and bony tenderness present. Decreased range of motion. Back:       Right hip: Bony tenderness present. Left hip: Bony tenderness present. Decreased strength.       Right knee: Normal.      Left knee: Normal. Skin:     General: Skin is warm. Coloration: Skin is not pale. Findings: No erythema or rash. Neurological:      Mental Status: She is alert and oriented to person, place, and time. She is not disoriented. Cranial Nerves: No cranial nerve deficit. Sensory: No sensory deficit. Motor: Weakness present. No atrophy or abnormal muscle tone. Coordination: Coordination normal.      Gait: Gait abnormal.      Deep Tendon Reflexes: Babinski sign absent on the right side. Reflex Scores:       Tricep reflexes are 1+ on the right side and 1+ on the left side. Bicep reflexes are 1+ on the right side and 1+ on the left side. Brachioradialis reflexes are 1+ on the right side and 1+ on the left side. Patellar reflexes are 1+ on the right side and 1+ on the left side. Achilles reflexes are 1+ on the right side and 1+ on the left side. Psychiatric:         Attention and Perception: Attention normal. She is attentive. Mood and Affect: Mood and affect normal. Mood is not anxious or depressed. Affect is not labile, blunt, angry or inappropriate. Speech: Speech normal. She is communicative. Speech is not rapid and pressured, delayed, slurred or tangential.         Behavior: Behavior normal. Behavior is not agitated, slowed, aggressive, withdrawn, hyperactive or combative. Behavior is cooperative. Thought Content: Thought content normal. Thought content is not paranoid or delusional. Thought content does not include homicidal or suicidal ideation. Thought content does not include homicidal or suicidal plan. Cognition and Memory: Cognition and memory normal. Memory is not impaired. She does not exhibit impaired recent memory or impaired remote memory. Judgment: Judgment normal. Judgment is not impulsive or inappropriate.       Comments: H/o depression       ERICA  Patricks test  positive  Yeoman's or Gaenslen's  positive  Kemps positive  Spurlings  positive  Walker'sna        Assessment:      1. Spondylosis of lumbar region without myelopathy or radiculopathy    2. Spinal stenosis of lumbar region without neurogenic claudication    3. SI (sacroiliac) joint inflammation (HCC)    4. Chronic pain syndrome       Plan:      Patient read and signed orientation and opioid agreement if prescribing  OARRS reviewed. Current MED: 240 on Suboxone   Patient was not offered naloxone for home. Discussed long term side effects of medications, tolerance, dependency and addiction. UDS possibly preformed today if needed  Patient told can not receive any pain medications from any other source. No evidence of abuse, diversion or aberrant behavior. Medications and/or procedures to improve function and quality of life- patient understanding with this and that may not be pain free  Discussed possible weaning of medication dosing dependent on treatment/procedure results. Discussed with patient about safe storage of medications at home  Testing, Labs or Radiology Reviewed: Lumbar Cervical MRI   Procedures: Lumbar Facet MBB#1 @ L4-5,5-S1 bilateral   Discussed with patient about risks with procedure including infection, reaction to medication, increased pain, or bleeding. Medications:[facility]  If patient is on blood thinners will need approval to hold: yes or no:N/A  Does patient have implanted device Stimulator, AICD or Pacemaker etc? N/A              Return for Lumbar Facet MBB @L4-5, 5-S1 bilateral #1, Follow up after procedure.

## 2022-12-06 NOTE — ANESTHESIA POSTPROCEDURE EVALUATION
Department of Anesthesiology  Postprocedure Note    Patient: Karina Pugh  MRN: [id number]  YOB: 1970  Date of evaluation: 12/6/2022      Procedure Summary     Date: 12/06/22 Room / Location: [facility] OR 03 / [facility]    Anesthesia Start: 7582 Anesthesia Stop: 0830    Procedure: Lumbar Facet Medial branch block Lumbar 4-5, 5-Sacral 1 bilateral (Bilateral: Back) Diagnosis:       Spondylosis of lumbar region without myelopathy or radiculopathy      (Spondylosis of lumbar region without myelopathy or radiculopathy)    Surgeons: Meghan Bush MD Responsible Provider: Amadou Mandel MD    Anesthesia Type: MAC ASA Status: 2          Anesthesia Type: MAC    Marlin Phase I:      Marlin Phase II: Marlin Score: 9      Anesthesia Post Evaluation    Complications: no

## 2022-12-06 NOTE — OP NOTE
Pre-Procedure Note    Patient Name: Cass Medina   YOB: 1970  Room/Bed: [address] [place] [address] [facility]  Medical Record Number: [id number]  Date: 12/6/22      Indication:  Lower back pain    Consent: On file. Vital Signs:   Vitals:    12/06/22 0701   BP: 88/63   Pulse: 77   Resp: 16   Temp: 97 °F (36.1 °C)   SpO2: 95%       Pre-Sedation Documentation and Exam:   Vital signs have been reviewed (see flow sheet for vitals). Sedation/ Anesthesia Plan:   MAC    Patient is an appropriate candidate for plan of sedation: yes    Preoperative Diagnosis:   L-spondylosis      Postoperative Diagnosis:  as above    Procedure Performed:  Diagnostic/Confirmatory median branch blocks at the levels of L4-5 and L5-S1 bilateral under fluoroscopic guidance #1. Indication for the Procedure: The patient has a history of chronic low back pain that is not responding well to the conservative treatment. The patient's pain is mostly axial in nature. Pain is interfering with the activities of daily living. Physical examination revealed facet tenderness and facet loading is positive. The procedure and risks  were discussed with the patient and an informed consent was obtained. Procedure:     Patient is placed in prone position and skin over  the back was prepped and draped in sterile manner. Then using fluoroscopy the junction of the transverse process of the vertebra with the superior process of the facet joint was observed and the view was optimized. The skin and deep tissues posterior were infiltrated with  20  ml of 1% lidocaine. Then a #22-gauge 3-1/2  inch spinal needle was introduced through the skin wheal under fluoroscopy guidance such that the tip of the needle lies at the junction of the transverse process  L3/L4/L5 with the superior processes of the facet joint. Then after negative aspiration a total of 12 ml of 0.25% Marcaine and depomedrol (total 80 mg) was injected through the needles in divided doses.         For L5 Median branch block the junction of the ala of  the sacrum with the superior articular process of the facet joint was taken as a reference point and L4 median branch the junction of the transverse process the L5 with the superolateral possible facet joint was taken to the point and healthy median branch the junction of the transverse process of L4 with the superior lateral process of the facet joint was taken as a reference point. For S1 median branch the most lateral and superior aspect of S1 foramina was taken as a reference point. EBL-0  Patient's vital signs and neurological status remained stable through  the procedure and post procedural  Period. Patient was instructed to keep track of pain for next 24 hours. every hour and bring it with next visit. Patient tollerated the procedure well and was discharged home in stable condition.     Electronically signed by Ashkan Reyes MD on 12/6/22 at 8:19 AM EST

## 2023-02-09 ENCOUNTER — TELEPHONE (OUTPATIENT)
Dept: CARDIOLOGY CLINIC | Age: 53
End: 2023-02-09

## 2023-02-09 ENCOUNTER — OFFICE VISIT (OUTPATIENT)
Dept: PULMONOLOGY | Age: 53
End: 2023-02-09

## 2023-02-09 VITALS
HEART RATE: 84 BPM | WEIGHT: 168.4 LBS | DIASTOLIC BLOOD PRESSURE: 68 MMHG | TEMPERATURE: 97.6 F | SYSTOLIC BLOOD PRESSURE: 108 MMHG | HEIGHT: 59 IN | OXYGEN SATURATION: 97 % | BODY MASS INDEX: 33.95 KG/M2

## 2023-02-09 DIAGNOSIS — Z87.891 PERSONAL HISTORY OF TOBACCO USE: ICD-10-CM

## 2023-02-09 DIAGNOSIS — R91.1 NODULE OF LOWER LOBE OF RIGHT LUNG: Primary | ICD-10-CM

## 2023-02-09 DIAGNOSIS — Z87.891 PERSONAL HISTORY OF TOBACCO USE, PRESENTING HAZARDS TO HEALTH: ICD-10-CM

## 2023-02-09 DIAGNOSIS — R06.2 WHEEZING: ICD-10-CM

## 2023-02-09 DIAGNOSIS — R22.43 LOCALIZED SWELLING OF BOTH LOWER LEGS: ICD-10-CM

## 2023-02-09 RX ORDER — ALBUTEROL SULFATE 90 UG/1
2 AEROSOL, METERED RESPIRATORY (INHALATION) EVERY 4 HOURS PRN
Qty: 1 EACH | Refills: 3 | Status: SHIPPED | OUTPATIENT
Start: 2023-02-09

## 2023-02-09 ASSESSMENT — ENCOUNTER SYMPTOMS
WHEEZING: 0
ALLERGIC/IMMUNOLOGIC NEGATIVE: 1
GASTROINTESTINAL NEGATIVE: 1
SHORTNESS OF BREATH: 1
COUGH: 0
EYES NEGATIVE: 1

## 2023-02-09 NOTE — PROGRESS NOTES
[facility]    Patient: Godfrey Morales, 46 y.o.   : 1970    Pt of Dr. Yaw Arias   Patient presents with    Follow-up     3 months with labs on 2023     BENITA Noble is here for follow up for pulmonary nodule w/connective tissue testing. Current smoker- smoking < 1/2 PPD now  PFT no obstruction seen   No emphysema seen on imaging   No home oxygen uses   No current inhaler use   A lot of wheezing per patient with family hx of asthma - NIOX today in office  C/O leg swelling R>L has been using her boyfriends water pills at times sees no Cardiology     Progress History:   Since last visit any new medical issues? No  New ER or hospital visits? No  Any new or changes in medicines? No  Using inhalers? No  Are they helpful? NA  Flu vaccine NA  Pneumonia vaccine- series completed   Covid vaccine?  NA    Past Medical hx   PMH:  Past Medical History:   Diagnosis Date    Abnormal Pap smear of cervix     Chronic back pain     Endometriosis     Hip pain     MRSA (methicillin resistant staph aureus) culture positive 2014    urine    Obesity 2015     SURGICAL HISTORY:  Past Surgical History:   Procedure Laterality Date    BUNIONECTOMY      HEMORRHOID SURGERY      LEEP      PAIN MANAGEMENT PROCEDURE Bilateral 2022    Lumbar Facet Medial branch block Lumbar 4-5, 5-Sacral 1 bilateral performed by Molly Walden MD at [facility]       SOCIAL HISTORY:  Social History     Tobacco Use    Smoking status: Every Day     Packs/day: 1.00     Years: 38.00     Pack years: 38.00     Types: Cigarettes     Start date: 1984    Smokeless tobacco: Never    Tobacco comments:     0.25 ppd 23   Substance Use Topics    Alcohol use: No    Drug use: No     ALLERGIES:  Allergies   Allergen Reactions    Ibuprofen     Naproxen Rash     FAMILY HISTORY:  Family History   Problem Relation Age of Onset    Lung Cancer Father     Arthritis Neg Hx     Asthma Neg Hx     Birth Defects Neg Hx     Cancer Neg Hx     Depression Neg Hx     Diabetes Neg Hx     Early Death Neg Hx     Hearing Loss Neg Hx     Heart Disease Neg Hx     High Blood Pressure Neg Hx     High Cholesterol Neg Hx     Kidney Disease Neg Hx     Learning Disabilities Neg Hx     Mental Illness Neg Hx     Mental Retardation Neg Hx     Miscarriages / Stillbirths Neg Hx     Stroke Neg Hx     Substance Abuse Neg Hx     Vision Loss Neg Hx     Other Neg Hx      CURRENT MEDICATIONS:  Current Outpatient Medications   Medication Sig Dispense Refill    albuterol sulfate HFA (VENTOLIN HFA) 108 (90 Base) MCG/ACT inhaler Inhale 2 puffs into the lungs every 4 hours as needed for Wheezing or Shortness of Breath 1 each 3    citalopram (CELEXA) 10 MG tablet Take 10 mg by mouth daily      prazosin (MINIPRESS) 1 MG capsule Take 1 mg by mouth nightly      buprenorphine-naloxone (SUBOXONE) 8-2 MG FILM SL film Place 1 Film under the tongue daily. No current facility-administered medications for this visit. ROS   Review of Systems   Constitutional: Negative. Negative for chills and fever. HENT:  Positive for postnasal drip. Negative for congestion. Eyes: Negative. Respiratory:  Positive for shortness of breath. Negative for cough and wheezing. Cardiovascular: Negative. Negative for chest pain and leg swelling. Gastrointestinal: Negative. Endocrine: Negative. Genitourinary: Negative. Musculoskeletal: Negative. Allergic/Immunologic: Negative. Neurological: Negative. Hematological: Negative. Psychiatric/Behavioral: Negative. Negative for sleep disturbance.        Physical exam   /68 (Site: Left Lower Arm, Position: Sitting, Cuff Size: Medium Adult)   Pulse 84   Temp 97.6 °F (36.4 °C) (Skin)   Ht 4' 11\" (1.499 m)   Wt 168 lb 6.4 oz (76.4 kg)   SpO2 97%   BMI 34.01 kg/m²    Wt Readings from Last 3 Encounters:   02/09/23 168 lb 6.4 oz (76.4 kg)   12/06/22 170 lb 3.2 oz (77.2 kg)   11/09/22 170 lb 6.4 oz (77.3 kg)       Physical Exam  Vitals and nursing note reviewed. Constitutional:       Appearance: She is well-developed. She is obese. HENT:      Head: Normocephalic and atraumatic. Eyes:      Conjunctiva/sclera: Conjunctivae normal.      Pupils: Pupils are equal, round, and reactive to light. Neck:      Vascular: No JVD. Cardiovascular:      Rate and Rhythm: Normal rate and regular rhythm. Heart sounds: Normal heart sounds. No murmur heard. No friction rub. No gallop. Pulmonary:      Effort: Pulmonary effort is normal. No respiratory distress. Breath sounds: Normal breath sounds. No wheezing or rales. Abdominal:      General: Bowel sounds are normal.      Palpations: Abdomen is soft. Musculoskeletal:         General: Normal range of motion. Cervical back: Normal range of motion and neck supple. Skin:     General: Skin is warm and dry. Capillary Refill: Capillary refill takes less than 2 seconds. Neurological:      Mental Status: She is alert and oriented to person, place, and time. Psychiatric:         Behavior: Behavior normal.         Thought Content: Thought content normal.         Judgment: Judgment normal.        Results   Lung Nodule Screening     [x] Qualifies    [] Does not qualify   [] Declined    [] Completed    The USPSTF recommends annual screening for lung cancer with low-dose computed tomography (LDCT) in adults aged 48 to [de-identified] years who have a 30 pack-year smoking history and currently smoke or have quit within the past 20 years. Screening should be discontinued once a person has not smoked for 20 years or develops a health problem that substantially limits life expectancy or the ability or willingness to have curative lung surgery.       Latest Reference Range & Units 11/9/22 10:40   Quantiferon TB Minus NIL Negative  Negative   Quantiferon TB1 Minus NIL 0.00 - 0.34 IU/mL 0.00   Quantiferon TB2 Minus NIL 0.00 - 0.34 IU/mL 0.01   QuantiFERON Nil IU/mL 0.03   Histoplasma Antigen Urine Not Detected   ng/mL Not Detected  Not Detected   Aspergillus Ab CF <1:8  < 1:8   Blastomyces Antibody CF <=0.9 IV 0.6   Histoplasma Ab Mycelial CF <1:8  < 1:8   Histoplasma Ab Yeast CF <1:8  < 1:8   COCCIDIODES AB CF <1:2  < 1:2   CARMEN SCREEN None Detected  None Detected   Rheumatoid Factor 0 - 13 IU/mL < 10         Assessment      Diagnosis Orders   1. Nodule of lower lobe of right lung        2. Personal history of tobacco use, presenting hazards to health  albuterol sulfate HFA (VENTOLIN HFA) 108 (90 Base) MCG/ACT inhaler      3. Personal history of tobacco use  [place] VISIT TO DISCUSS LUNG CA SCREEN W LDCT    CT Lung Screen (Annual/Baseline)      4. Wheezing  POCT Nitric Oxide      5. Localized swelling of both lower legs  ECHO Complete 2D W Doppler W Color    [facility]           -9 mm Right minor fissure pulmonary nodule noted on her CT scan of abdomen pelvis with contrast performed at [facility] in 2018-stable for the last 4 years consistent with right minor fissure infra fissural lymph node per radiologist  -Chronic tobacco smoking for 38 years. On average she smoked 1 pack of cigarettes per day. She is still smoking 0.5 pack of cigarettes per day. -History of endometriosis  -PTSD on treatment medications. -Depression on treatment with medication. Plan   -LDCT in 8 months   -NIOX today in the office 8 ppb no significant airway inflammation   -Recommend patient stop smoking,Smoking cessation discussed for 3-5 min. Educated patient on health hazards and negative effects of smoking. Counseled on ways to quit,  Offered cessation assistance with prescription mediations,  over the counter remedies, community resources/ phone APPs. Patient refuses additional assistance at this time.    Nicotine gum and lozenges  ¨ Nicotine inhaler  · Ask your doctor about bupropion (Wellbutrin) or varenicline (Chantix), which are prescription medicines. They do not contain nicotine. They help you by reducing withdrawal symptoms, such as stress and anxiety. · Some people find hypnosis, acupuncture, and massage helpful for ending the smoking habit. -Advised to maintain pneumonia vaccine with PCP and to take flu vaccine this coming season.  -Advised patient to call office with any changes, questions, or concerns regarding respiratory status  -ECHO ordered for BLE  -Cardiology referral due to bilateral leg swelling   -Weight loss encouraged     Will see Oscar Roberson back in: 8 months    Silvia Ramos CNP  2/9/2023    Discussed with the patient the current USPSTF guidelines released March 9, 2021 for screening for lung cancer. For adults aged 48 to [de-identified] years who have a 20 pack-year smoking history and currently smoke or have quit within the past 15 years the grade B recommendation is to:  Screen for lung cancer with low-dose computed tomography (LDCT) every year. Stop screening once a person has not smoked for 15 years or has a health problem that limits life expectancy or the ability to have lung surgery. The patient  reports that she has been smoking cigarettes. She started smoking about 39 years ago. She has a 38.00 pack-year smoking history. She has never used smokeless tobacco.. Discussed with patient the risks and benefits of screening, including over-diagnosis, false positive rate, and total radiation exposure. The patient currently exhibits no signs or symptoms suggestive of lung cancer. Discussed with patient the importance of compliance with yearly annual lung cancer screenings and willingness to undergo diagnosis and treatment if screening scan is positive. In addition, the patient was counseled regarding the importance of remaining smoke free and/or total smoking cessation.     Also reviewed the following if the patient has Medicare that as of February 10, 2022, Medicare only covers LDCT screening in patients aged 51-72 with at least a 20 pack-year smoking history who currently smoke or have quit in the last 15 years

## 2023-02-09 NOTE — PATIENT INSTRUCTIONS

## 2023-02-13 ENCOUNTER — TELEPHONE (OUTPATIENT)
Dept: PHYSICAL MEDICINE AND REHAB | Age: 53
End: 2023-02-13

## 2023-02-13 ENCOUNTER — OFFICE VISIT (OUTPATIENT)
Dept: PHYSICAL MEDICINE AND REHAB | Age: 53
End: 2023-02-13

## 2023-02-13 ENCOUNTER — HOSPITAL ENCOUNTER (OUTPATIENT)
Dept: NON INVASIVE DIAGNOSTICS | Age: 53
Discharge: HOME | End: 2023-02-13
Payer: MEDICAID

## 2023-02-13 VITALS
SYSTOLIC BLOOD PRESSURE: 116 MMHG | HEIGHT: 59 IN | WEIGHT: 168 LBS | DIASTOLIC BLOOD PRESSURE: 68 MMHG | BODY MASS INDEX: 33.87 KG/M2

## 2023-02-13 DIAGNOSIS — M79.18 MYOFASCIAL PAIN SYNDROME: ICD-10-CM

## 2023-02-13 DIAGNOSIS — M48.061 SPINAL STENOSIS OF LUMBAR REGION WITHOUT NEUROGENIC CLAUDICATION: ICD-10-CM

## 2023-02-13 DIAGNOSIS — G89.4 CHRONIC PAIN SYNDROME: ICD-10-CM

## 2023-02-13 DIAGNOSIS — M47.816 SPONDYLOSIS OF LUMBAR REGION WITHOUT MYELOPATHY OR RADICULOPATHY: Primary | ICD-10-CM

## 2023-02-13 DIAGNOSIS — M46.1 SI (SACROILIAC) JOINT INFLAMMATION (HCC): ICD-10-CM

## 2023-02-13 DIAGNOSIS — R22.43 LOCALIZED SWELLING OF BOTH LOWER LEGS: ICD-10-CM

## 2023-02-13 LAB
LV EF: 60 %
LVEF MODALITY: NORMAL

## 2023-02-13 PROCEDURE — 93306 TTE W/DOPPLER COMPLETE: CPT

## 2023-02-13 RX ORDER — TRIAMCINOLONE ACETONIDE 40 MG/ML
40 INJECTION, SUSPENSION INTRA-ARTICULAR; INTRAMUSCULAR ONCE
Status: COMPLETED | OUTPATIENT
Start: 2023-02-13 | End: 2023-02-13

## 2023-02-13 RX ADMIN — TRIAMCINOLONE ACETONIDE 40 MG: 40 INJECTION, SUSPENSION INTRA-ARTICULAR; INTRAMUSCULAR at 13:51

## 2023-02-13 ASSESSMENT — ENCOUNTER SYMPTOMS
SINUS PRESSURE: 0
RHINORRHEA: 0
DIARRHEA: 0
COLOR CHANGE: 0
NAUSEA: 0
ABDOMINAL PAIN: 0
SORE THROAT: 0
PHOTOPHOBIA: 0
BACK PAIN: 1
CONSTIPATION: 0
COUGH: 0
EYE PAIN: 0
VOMITING: 0
WHEEZING: 0
SHORTNESS OF BREATH: 0
CHEST TIGHTNESS: 0

## 2023-02-13 NOTE — TELEPHONE ENCOUNTER
Pt. denies taking any blood thinners prior to procedure scheduling but is having and ECHO  today @ 4pm and has a follow up with Dr. Analia Dunn due to increased swelling in bilateral legs. Will [facility]. Clearance faxed after follow up appointment. Pt. Will call office when completed.

## 2023-02-13 NOTE — PROGRESS NOTES
[facility] [address]  Dept: [phone]  Dept Fax: [phone]: [phone]    Visit Date: 2/13/2023    Functionality Assessment/Goals Worksheet     On a scale of 0 (Does not Interfere) to 10 (Completely Interferes)     1. Which number describes how during the past week pain has interfered with       the following:  A. General Activity:  7  B. Mood: 7  C. Walking Ability:  8  D. Normal Work (Includes both work outside the home and housework):  8  E. Relations with Other People:   8  F. Sleep:   8  G. Enjoyment of Life:   9    2. Patient Prefers to Take their Pain Medications:     []  On a regular basis   []  Only when necessary    [x]  Does not take pain medications    3. What are the Patient's Goals/Expectations for Visiting Pain Management? [x]  Learn about my pain    []  Receive Medication   []  Physical Therapy     []  Treat Depression   []  Receive Injections    []  Treat Sleep   []  Deal with Anxiety and Stress   []  Treat Opoid Dependence/Addiction   []  Other:    HPI:   Kevin Solano is a 46 y.o. female is here today for    Chief Complaint: Lumbar back pain, Left leg pain, and SI pain      F/U Lumbar Facet MBB L4-5,5-S1 Bilateral #1 2/9/2023 states she received about 80% pain relief or benefit  for 3 days. Then slowly wore off. She conitnues to have mostly all low back axial pain facet mediated. She has been having increased BLE swelling pending ECHO today. She has a right  more lateral  muscle knotted area that bothers her. HPI    New pt here for c.o low back pain mostly all axial  prior but more recently  pain radiates down back of  buttocks thigh into Left Si and LLE  stops above knee. She has had low back pain for  a few years. She works as  and [employer]. work. She has had a fall but many years prior to  pain starting.  Denies any other injuries falls or trauma. She went to see Dr Wendi Sam this year in Spring 2022 due to increased low back pain. She had PT no relief. She  denies any prior lumbar surgery or injections in the past.      She  has h/o of opoid abuse, [facility] [address]         She has constant low back mostly axial facet mediated aching pain 80-90% of the time with walking standing bending, the pain increases  to more severe  sharp stabbing with increased activity and long term walking standing cleaning cooking  and working daily ADLs . She has some  pinching  numbness tingling pain  in left SI  left buttock down LLE at times. 10-20%  She has back stiffness with spasms. She has LLE weakness at times with long term standing walking. She has limited ROM, antalgic gait. She denies any bowel or baldder dysfunction. Patient pain increases with bending, lifting, twisting , reaching, pushing, pulling, walking, standing, stairs, getting up and down, and housework or working at job. Treatments tried PT/HEP, ICE/HEAT, NSAIDS, narcotics, muscle relaxer, OTC rubs creams patches, and massage or TPI  Pain description sharp, shooting, stabbing/jabbing, burning, aching, numbness/tingling, and pins and needles    She denies any ER visits or new health issues since last visit. Pain scale with out pain medications or at its worst is 8/10. Pain scale with pain medications or at its best is 4/10. Prior Injections:  Lumbar Facet MBB L4-5,5-S1 Bilateral #1 2/9/2023 states she received about 80% pain relief or benefit  for 3 days. Then slowly wore off. Radiology:    FINDINGS:   There is anatomic vertebral body height and alignment. There are couple of foci of hyperintense T1 and T2 signal in the T12 and L4 vertebral bodies as evidence for hemangiomas. Marrow signal is otherwise within normal limits. The conus terminates in a    normal fashion at the L1 level.  The cauda equina is normal in appearance without nerve root thickening or clumping identified. Paraspinal soft tissues are unremarkable. At T12-L1 through L3-4 intervertebral discs are normal in appearance. There is no significant spinal canal or neuroforaminal stenosis. At L4-5 there is a minimal disc bulge which contributes to mild spinal canal stenosis and mild bilateral foraminal stenosis in association with facet hypertrophy and ligament flavum thickening. At L5-S1 there is no significant spinal canal or neuroforaminal stenosis. Impression    Mild degenerative changes at L4-5 where a minimal disc bulge and facet hypertrophy and ligamentum flavum thickening cause mild spinal canal stenosis and mild bilateral neural foraminal stenosis. FINDINGS:   There is anatomic vertebral body height and alignment. Marrow signal is within normal limits. The craniocervical junction appears intact. The cervical spinal cord is normal in caliber without focal area of abnormal T2 signal. Paraspinal soft tissues are    unremarkable. At C2-3 there is no significant spinal canal or neuroforaminal stenosis. At C3-4 there is mild uncovertebral joint degenerative change on the right which minimally narrows the right lateral recess. No significant neural foraminal stenosis is present. At C4-5 there is no significant spinal canal stenosis. There is mild left and moderate right neuroforaminal stenosis in association with uncovertebral joint degenerative change. At C5-6 there is no significant spinal canal or neuroforaminal stenosis. At C6-7 there is a minimal disc bulge which contributes to mild spinal canal stenosis in association with posterior ligament flavum thickening. There is mild bilateral foraminal stenosis in association with mild uncovertebral joint degenerative change. At C7-T1 there is no significant spinal canal or neuroforaminal stenosis.            Impression    Overall mild degenerative changes of the cervical spine more focally pronounced at C4-5 where there is no significant neural canal stenosis but mild left and moderate right neural foraminal stenosis in association with uncovertebral joint degenerative    change. The patientis allergic to ibuprofen and naproxen. Subjective:      Review of Systems   Constitutional:  Positive for activity change. Negative for appetite change, chills, diaphoresis, fatigue, fever and unexpected weight change. HENT:  Negative for congestion, ear pain, hearing loss, mouth sores, nosebleeds, rhinorrhea, sinus pressure and sore throat. Eyes:  Negative for photophobia, pain and visual disturbance. Respiratory:  Negative for cough, chest tightness, shortness of breath and wheezing. Pending PFT  Nodule on right lung   Cardiovascular:  Positive for leg swelling. Negative for chest pain and palpitations. Gastrointestinal:  Negative for abdominal pain, constipation, diarrhea, nausea and vomiting. Endocrine: Negative for cold intolerance, heat intolerance, polydipsia, polyphagia and polyuria. Genitourinary:  Negative for decreased urine volume, difficulty urinating, frequency and hematuria. Musculoskeletal:  Positive for arthralgias, back pain, gait problem and myalgias. Negative for joint swelling, neck pain and neck stiffness. Skin:  Negative for color change and rash. Allergic/Immunologic: Negative for food allergies and immunocompromised state. Neurological:  Negative for dizziness, tremors, seizures, syncope, facial asymmetry, speech difficulty, weakness, light-headedness, numbness and headaches. Hematological:  Does not bruise/bleed easily. Psychiatric/Behavioral:  Negative for agitation, behavioral problems, confusion, decreased concentration, dysphoric mood, hallucinations, self-injury, sleep disturbance and suicidal ideas. The patient is nervous/anxious. The patient is not hyperactive.          Depression      Objective:     Vitals: 02/13/23 1317   BP: 116/68   Site: Left Upper Arm   Position: Sitting   Cuff Size: Large Adult   Weight: 168 lb (76.2 kg)   Height: 4' 11\" (1.499 m)       Physical Exam  Vitals and nursing note reviewed. Constitutional:       General: She is not in acute distress. Appearance: She is well-developed. She is not diaphoretic. HENT:      Head: Normocephalic and atraumatic. Right Ear: External ear normal.      Left Ear: External ear normal.      Nose: Nose normal.      Mouth/Throat:      Pharynx: No oropharyngeal exudate. Eyes:      General: No scleral icterus. Right eye: No discharge. Left eye: No discharge. Conjunctiva/sclera: Conjunctivae normal.      Pupils: Pupils are equal, round, and reactive to light. Neck:      Thyroid: No thyromegaly. Cardiovascular:      Rate and Rhythm: Normal rate and regular rhythm. Heart sounds: Normal heart sounds. No murmur heard. No friction rub. No gallop. Pulmonary:      Effort: Pulmonary effort is normal. No respiratory distress. Breath sounds: Normal breath sounds. No wheezing or rales. Chest:      Chest wall: No tenderness. Abdominal:      General: Bowel sounds are normal. There is no distension. Palpations: Abdomen is soft. Tenderness: There is no abdominal tenderness. There is no guarding or rebound. Musculoskeletal:         General: Tenderness present. Right shoulder: Normal.      Left shoulder: Normal.      Cervical back: Neck supple. Spasms, tenderness and bony tenderness present. No edema, erythema or rigidity. Pain with movement present. No muscular tenderness. Decreased range of motion. Lumbar back: Spasms, tenderness and bony tenderness present. Decreased range of motion. Back:       Right hip: Bony tenderness present. Left hip: Bony tenderness present. Decreased strength. Right knee: Normal.      Left knee: Normal.   Skin:     General: Skin is warm.       Coloration: Skin is not pale.      Findings: No erythema or rash. Neurological:      Mental Status: She is alert and oriented to person, place, and time. She is not disoriented. Cranial Nerves: No cranial nerve deficit. Sensory: No sensory deficit. Motor: Weakness present. No atrophy or abnormal muscle tone. Coordination: Coordination normal.      Gait: Gait abnormal.      Deep Tendon Reflexes: Babinski sign absent on the right side. Reflex Scores:       Tricep reflexes are 1+ on the right side and 1+ on the left side. Bicep reflexes are 1+ on the right side and 1+ on the left side. Brachioradialis reflexes are 1+ on the right side and 1+ on the left side. Patellar reflexes are 1+ on the right side and 1+ on the left side. Achilles reflexes are 1+ on the right side and 1+ on the left side. Psychiatric:         Attention and Perception: Attention normal. She is attentive. Mood and Affect: Mood and affect normal. Mood is not anxious or depressed. Affect is not labile, blunt, angry or inappropriate. Speech: Speech normal. She is communicative. Speech is not rapid and pressured, delayed, slurred or tangential.         Behavior: Behavior normal. Behavior is not agitated, slowed, aggressive, withdrawn, hyperactive or combative. Behavior is cooperative. Thought Content: Thought content normal. Thought content is not paranoid or delusional. Thought content does not include homicidal or suicidal ideation. Thought content does not include homicidal or suicidal plan. Cognition and Memory: Cognition and memory normal. Memory is not impaired. She does not exhibit impaired recent memory or impaired remote memory. Judgment: Judgment normal. Judgment is not impulsive or inappropriate. Comments: H/o depression      ERICA  Patricks test  positive  Yeoman's  or Gaenslen's positive  Kemps  positive  Spurlings  na  Walker's na         Assessment:     1. Spondylosis of lumbar region without myelopathy or radiculopathy    2. Spinal stenosis of lumbar region without neurogenic claudication    3. SI (sacroiliac) joint inflammation (HCC)    4. Chronic pain syndrome    5. Myofascial pain syndrome            Plan:      OARRS reviewed. Current MED: 240 per suboxone clinic   Patient was offered naloxone for home. Testing Labs or Radiology reviewed: Lumbar and Cervical MRI   Procedures:Lumbar Facet MBB #2 @ L4-5,5-S1 bilateral   Discussed with patient about risks with procedure including infection, reaction to medication, increased pain, or bleeding. [address] [place] [address]  Procedure  Visit Date: 2/13/23    Nickolas Manuel is a 46 y.o. female presents today in the office for the following:  Chief Complaint   Patient presents with    Follow-up     F/U       History of Present Illness   Armen Antunez is here today for trigger point injections. Pre-Op Diagnosis   Myofacial pain syndrome     Post-Op Diagnosis   Myofacial pain syndrome     Procedure Documentation   Patient identified. Consent signed. Site identified. A solution of 9cc 0.5% and 40mg (1cc) Kenalog was combined in each syringe. Site was sprayed with Ethyl chloride and then prepped with alcohol swap X 3. Then with a 25g needle entered each trigger point and after negative aspiration, injected 1-2 cc of Bupivacaine/Kenalog solution at each site. A total of 10 cc was used for procedure. Total of 5 sites were injected into 2 muscles. Lumbar paraspinal right latissimus dorsi     Vitals:    02/13/23 1317   BP: 116/68   Site: Left Upper Arm   Position: Sitting   Cuff Size: Large Adult   Weight: 168 lb (76.2 kg)   Height: 4' 11\" (1.499 m)       Conclusion   No complications encountered. Patient discharged stable condition. Patient told if any problems to call office or go to ER.     Orders Placed This Encounter   Medications    triamcinolone acetonide (KENALOG-40) injection 40 mg       Electronically signed by Roby Jeans, APRN - CNP on 2/13/23 at 1:32 PM EST    Meds. Prescribed:   Orders Placed This Encounter   Medications    triamcinolone acetonide (KENALOG-40) injection 40 mg         Return for Lumbar Facet MBB @L4-5, 5-S1 Bilateral #2, Follow up after procedure.        Electronically signed by Roby Jeans, APRN - CNP on2/13/2023 at 1:43 PM

## 2023-02-27 ENCOUNTER — OFFICE VISIT (OUTPATIENT)
Dept: CARDIOLOGY CLINIC | Age: 53
End: 2023-02-27
Payer: MEDICAID

## 2023-02-27 VITALS
DIASTOLIC BLOOD PRESSURE: 76 MMHG | HEART RATE: 70 BPM | SYSTOLIC BLOOD PRESSURE: 112 MMHG | HEIGHT: 59 IN | WEIGHT: 171 LBS | BODY MASS INDEX: 34.47 KG/M2

## 2023-02-27 DIAGNOSIS — Z01.818 PRE-OP EVALUATION: ICD-10-CM

## 2023-02-27 DIAGNOSIS — R12 HEARTBURN: ICD-10-CM

## 2023-02-27 DIAGNOSIS — R60.0 BILATERAL LEG EDEMA: ICD-10-CM

## 2023-02-27 DIAGNOSIS — R06.02 SOB (SHORTNESS OF BREATH) ON EXERTION: Primary | ICD-10-CM

## 2023-02-27 DIAGNOSIS — R94.31 ABNORMAL EKG: ICD-10-CM

## 2023-02-27 DIAGNOSIS — Z82.49 FAMILY HISTORY OF PREMATURE CAD: ICD-10-CM

## 2023-02-27 DIAGNOSIS — Z72.0 TOBACCO ABUSE: ICD-10-CM

## 2023-02-27 DIAGNOSIS — K21.9 GASTROESOPHAGEAL REFLUX DISEASE, UNSPECIFIED WHETHER ESOPHAGITIS PRESENT: ICD-10-CM

## 2023-02-27 DIAGNOSIS — R42 DIZZINESS ON STANDING: ICD-10-CM

## 2023-02-27 PROCEDURE — G8484 FLU IMMUNIZE NO ADMIN: HCPCS | Performed by: INTERNAL MEDICINE

## 2023-02-27 PROCEDURE — G8417 CALC BMI ABV UP PARAM F/U: HCPCS | Performed by: INTERNAL MEDICINE

## 2023-02-27 PROCEDURE — G8427 DOCREV CUR MEDS BY ELIG CLIN: HCPCS | Performed by: INTERNAL MEDICINE

## 2023-02-27 PROCEDURE — 4004F PT TOBACCO SCREEN RCVD TLK: CPT | Performed by: INTERNAL MEDICINE

## 2023-02-27 PROCEDURE — 3017F COLORECTAL CA SCREEN DOC REV: CPT | Performed by: INTERNAL MEDICINE

## 2023-02-27 PROCEDURE — 93000 ELECTROCARDIOGRAM COMPLETE: CPT | Performed by: INTERNAL MEDICINE

## 2023-02-27 PROCEDURE — 99204 OFFICE O/P NEW MOD 45 MIN: CPT | Performed by: INTERNAL MEDICINE

## 2023-02-27 RX ORDER — DOCUSATE SODIUM 100 MG/1
CAPSULE, LIQUID FILLED ORAL
COMMUNITY
Start: 2023-01-25

## 2023-02-27 NOTE — PROGRESS NOTES
Chief Complaint   Patient presents with    Follow-up     Bilateral leg swelling       Referred by pulm  for leg edema    Leg edema for the last few week, usually at end of the day    Sob on exertion  Denied cp, but had heart burn on otc    Rare  palpitation     Dizziness on standing  in the last few days only    EKG done today    Smokes 1.5 ppd for 45 yrs    Vickey Morgan had  CABG in his lats 42's  Parent okay      Past Surgical History:   Procedure Laterality Date    BUNIONECTOMY      HEMORRHOID SURGERY      LEEP      PAIN MANAGEMENT PROCEDURE Bilateral 12/6/2022    Lumbar Facet Medial branch block Lumbar 4-5, 5-Sacral 1 bilateral performed by Ashely Santana MD at [address]         Allergies   Allergen Reactions    Ibuprofen     Naproxen Rash        Family History   Problem Relation Age of Onset    Lung Cancer Father     Arthritis Neg Hx     Asthma Neg Hx     Birth Defects Neg Hx     Cancer Neg Hx     Depression Neg Hx     Diabetes Neg Hx     Early Death Neg Hx     Hearing Loss Neg Hx     Heart Disease Neg Hx     High Blood Pressure Neg Hx     High Cholesterol Neg Hx     Kidney Disease Neg Hx     Learning Disabilities Neg Hx     Mental Illness Neg Hx     Mental Retardation Neg Hx     Miscarriages / Stillbirths Neg Hx     Stroke Neg Hx     Substance Abuse Neg Hx     Vision Loss Neg Hx     Other Neg Hx         Social History     Socioeconomic History    Marital status: Legally      Spouse name: Not on file    Number of children: Not on file    Years of education: Not on file    Highest education level: Not on file   Occupational History    Not on file   Tobacco Use    Smoking status: Every Day     Packs/day: 1.00     Years: 38.00     Pack years: 38.00     Types: Cigarettes     Start date: 1/1/1984    Smokeless tobacco: Never    Tobacco comments:     0.25 ppd 2/9/23   Substance and Sexual Activity    Alcohol use: No    Drug use: No    Sexual activity: Yes   Other Topics Concern    Not on file   Social History Narrative    Not on file     Social Determinants of Health     Financial Resource Strain: Not on file   Food Insecurity: Not on file   Transportation Needs: Not on file   Physical Activity: Not on file   Stress: Not on file   Social Connections: Not on file   Intimate Partner Violence: Not on file   Housing Stability: Not on file       Current Outpatient Medications   Medication Sig Dispense Refill    docusate sodium (COLACE) 100 MG capsule take 1 capsule by mouth twice a day if needed for constipation      albuterol sulfate HFA (VENTOLIN HFA) 108 (90 Base) MCG/ACT inhaler Inhale 2 puffs into the lungs every 4 hours as needed for Wheezing or Shortness of Breath 1 each 3    citalopram (CELEXA) 10 MG tablet Take 10 mg by mouth daily      prazosin (MINIPRESS) 1 MG capsule Take 1 mg by mouth nightly      buprenorphine-naloxone (SUBOXONE) 8-2 MG FILM SL film Place 1 Film under the tongue daily. No current facility-administered medications for this visit. Review of Systems -     General ROS: negative  Psychological ROS: negative  Hematological and Lymphatic ROS: No history of blood clots or bleeding disorder. Respiratory ROS: no cough,  or wheezing, the rest see HPI  Cardiovascular ROS: See HPI  Gastrointestinal ROS: negative  Genito-Urinary ROS: no dysuria, trouble voiding, or hematuria  Musculoskeletal ROS: negative  Neurological ROS: no TIA or stroke symptoms  Dermatological ROS: negative      Blood pressure 112/76, pulse 70, height 4' 11\" (1.499 m), weight 171 lb (77.6 kg), not currently breastfeeding.         Physical Examination:    General appearance - alert, well appearing, and in no distress  HEENT- Pink conjunctiva  , Non-icteri sclera,PERRLA  Mental status - alert, oriented to person, place, and time  Neck - supple, no significant adenopathy, no JVD, or carotid bruits  Chest - clear to auscultation, no wheezes, rales or rhonchi, symmetric air entry  Heart - normal rate, regular rhythm, normal S1, S2, no murmurs, rubs, clicks or gallops  Abdomen - soft, nontender, nondistended, no masses or organomegaly  CAMPOS- no CVA or flank tenderness, no suprapubic tenderness  Neurological - alert, oriented, normal speech, no focal findings or movement disorder noted  Musculoskeletal/limbs - no joint tenderness, deformity or swelling   - peripheral pulses normal, no pedal edema, no clubbing or cyanosis  Skin - normal coloration and turgor, no rashes, no suspicious skin lesions noted  Psych- appropriate mood and affect    Lab  No results for input(s): CKTOTAL, CKMB, CKMBINDEX, TROPONINI in the last 72 hours.   CBC:   Lab Results   Component Value Date/Time    WBC 9.4 09/20/2022 04:22 PM    RBC 5.55 09/20/2022 04:22 PM    HGB 15.7 09/20/2022 04:22 PM    HCT 49.8 09/20/2022 04:22 PM    MCV 89.7 09/20/2022 04:22 PM    MCH 28.3 09/20/2022 04:22 PM    MCHC 31.5 09/20/2022 04:22 PM    RDW 12.2 09/20/2022 04:22 PM     09/20/2022 04:22 PM    MPV 9.6 09/20/2022 04:22 PM     BMP:    Lab Results   Component Value Date/Time     09/20/2022 04:22 PM    K 4.2 09/20/2022 04:22 PM     09/20/2022 04:22 PM    CO2 28 09/20/2022 04:22 PM    BUN 15 09/20/2022 04:22 PM    LABALBU 4.5 09/20/2022 04:22 PM    CREATININE 0.90 09/20/2022 04:22 PM    CALCIUM 9.9 09/20/2022 04:22 PM    GFRAA >60 09/20/2022 04:22 PM    LABGLOM >60 09/20/2022 04:22 PM    LABGLOM 77 03/07/2017 03:12 PM    GLUCOSE 83 09/20/2022 04:22 PM     Hepatic Function Panel:    Lab Results   Component Value Date/Time    ALKPHOS 67 09/20/2022 04:22 PM    ALT 21 09/20/2022 04:22 PM    AST 19 09/20/2022 04:22 PM    PROT 7.6 09/20/2022 04:22 PM    BILITOT 0.3 09/20/2022 04:22 PM    BILIDIR 0.09 06/08/2018 10:27 AM    IBILI 0.24 06/08/2018 10:27 AM    LABALBU 4.5 09/20/2022 04:22 PM     Magnesium:  No results found for: MG  Warfarin PT/INR:  No components found for: PTPATWAR, PTINRWAR  HgBA1c:  No results found for: LABA1C  FLP:    Lab Results   Component Value Date/Time    TRIG 303 09/20/2022 04:22 PM    HDL 35 09/20/2022 04:22 PM     TSH:    Lab Results   Component Value Date/Time    TSH 1.53 09/20/2022 04:22 PM     Ekg 2/27/23  Sinus  Rhythm   Low voltage in precordial leads.    -  Nonspecific T-abnormality. ABNORMAL           Conclusions      Summary   Normal left ventricle size and systolic function. Ejection fraction was   estimated at 60 %. There were no regional left ventricular wall motion   abnormalities and wall thickness was within normal limits. Signature      ----------------------------------------------------------------   Electronically signed by Chantell Santos MD (Interpreting   physician) on 02/13/2023 at 08:28 PM   ----------------------------------------------------------------      Assessment   Diagnosis Orders   1. SOB (shortness of breath) on exertion  Stress test, lexiscan      2. Heartburn  Stress test, lexiscan      3. Dizziness on standing  Stress test, lexiscan      4. Pre-op evaluation for back surgery  Stress test, lexiscan      5. Abnormal EKG  Stress test, lexiscan      6. Family history of premature CAD  Stress test, lexiscan      7. hx of Bilateral leg edema at the end of the day and no edema now- dependant  Stress test, lexiscan      8. Tobacco abuse  Stress test, lexiscan      9. Gastroesophageal reflux disease, unspecified whether esophagitis present                      Plan     Continue the current treatment and with constant vigilance to changes in symptoms and also any potential side effects. Return for care or seek medical attention immediately if symptoms got worse and/or develop new symptoms.     hx of Bilateral leg edema at the end of the day and no edema now- dependant  Leg elevation  No diuretic needed    Sob  Hx of hesart burn  Abn ekg  Pre op eval  Xander nuc  After Taking prilosec and heartburn got better   T=if test okay may proceed mod risk    Dizziness few days  Hydration advised       D/w the pat the plan of care    RTC in 4 weeks      [facility]

## 2023-03-10 ENCOUNTER — HOSPITAL ENCOUNTER (OUTPATIENT)
Dept: NON INVASIVE DIAGNOSTICS | Age: 53
Discharge: HOME | End: 2023-03-10
Payer: MEDICAID

## 2023-03-10 DIAGNOSIS — Z82.49 FAMILY HISTORY OF PREMATURE CAD: ICD-10-CM

## 2023-03-10 DIAGNOSIS — Z72.0 TOBACCO ABUSE: ICD-10-CM

## 2023-03-10 DIAGNOSIS — R60.0 BILATERAL LEG EDEMA: ICD-10-CM

## 2023-03-10 DIAGNOSIS — R42 DIZZINESS ON STANDING: ICD-10-CM

## 2023-03-10 DIAGNOSIS — Z01.818 PRE-OP EVALUATION: ICD-10-CM

## 2023-03-10 DIAGNOSIS — R12 HEARTBURN: ICD-10-CM

## 2023-03-10 DIAGNOSIS — R06.02 SOB (SHORTNESS OF BREATH) ON EXERTION: ICD-10-CM

## 2023-03-10 DIAGNOSIS — R94.31 ABNORMAL EKG: ICD-10-CM

## 2023-03-10 PROCEDURE — A9500 TC99M SESTAMIBI: HCPCS | Performed by: INTERNAL MEDICINE

## 2023-03-10 PROCEDURE — 78452 HT MUSCLE IMAGE SPECT MULT: CPT

## 2023-03-10 PROCEDURE — 93017 CV STRESS TEST TRACING ONLY: CPT | Performed by: INTERNAL MEDICINE

## 2023-03-10 PROCEDURE — 6360000002 HC RX W HCPCS

## 2023-03-10 PROCEDURE — 3430000000 HC RX DIAGNOSTIC RADIOPHARMACEUTICAL: Performed by: INTERNAL MEDICINE

## 2023-03-10 RX ORDER — TECHNETIUM TC-99M SESTAMIBI 1 MG/10ML
10 INJECTION INTRAVENOUS
Status: COMPLETED | OUTPATIENT
Start: 2023-03-10 | End: 2023-03-10

## 2023-03-10 RX ORDER — TECHNETIUM TC-99M SESTAMIBI 1 MG/10ML
34.9 INJECTION INTRAVENOUS
Status: COMPLETED | OUTPATIENT
Start: 2023-03-10 | End: 2023-03-10

## 2023-03-10 RX ADMIN — Medication 10 MILLICURIE: at 08:35

## 2023-03-10 RX ADMIN — Medication 34.9 MILLICURIE: at 09:27

## 2023-03-14 ENCOUNTER — OFFICE VISIT (OUTPATIENT)
Dept: CARDIOLOGY CLINIC | Age: 53
End: 2023-03-14
Payer: MEDICAID

## 2023-03-14 ENCOUNTER — TELEPHONE (OUTPATIENT)
Dept: CARDIOLOGY CLINIC | Age: 53
End: 2023-03-14

## 2023-03-14 VITALS
HEART RATE: 74 BPM | WEIGHT: 170.6 LBS | SYSTOLIC BLOOD PRESSURE: 134 MMHG | DIASTOLIC BLOOD PRESSURE: 80 MMHG | BODY MASS INDEX: 34.39 KG/M2 | HEIGHT: 59 IN

## 2023-03-14 DIAGNOSIS — R94.31 ABNORMAL EKG: ICD-10-CM

## 2023-03-14 DIAGNOSIS — Z82.49 FAMILY HISTORY OF PREMATURE CAD: ICD-10-CM

## 2023-03-14 DIAGNOSIS — R42 DIZZINESS ON STANDING: ICD-10-CM

## 2023-03-14 DIAGNOSIS — R12 HEARTBURN: ICD-10-CM

## 2023-03-14 DIAGNOSIS — R60.0 BILATERAL LEG EDEMA: ICD-10-CM

## 2023-03-14 DIAGNOSIS — Z01.818 PRE-OP EVALUATION: ICD-10-CM

## 2023-03-14 DIAGNOSIS — I20.8 ANGINAL EQUIVALENT (HCC): Primary | ICD-10-CM

## 2023-03-14 DIAGNOSIS — R94.39 ABNORMAL NUCLEAR STRESS TEST: ICD-10-CM

## 2023-03-14 DIAGNOSIS — R06.02 SOB (SHORTNESS OF BREATH) ON EXERTION: ICD-10-CM

## 2023-03-14 PROBLEM — I20.89 ANGINAL EQUIVALENT: Status: ACTIVE | Noted: 2023-03-14

## 2023-03-14 PROCEDURE — 99214 OFFICE O/P EST MOD 30 MIN: CPT | Performed by: INTERNAL MEDICINE

## 2023-03-14 PROCEDURE — G8427 DOCREV CUR MEDS BY ELIG CLIN: HCPCS | Performed by: INTERNAL MEDICINE

## 2023-03-14 PROCEDURE — G8417 CALC BMI ABV UP PARAM F/U: HCPCS | Performed by: INTERNAL MEDICINE

## 2023-03-14 PROCEDURE — G8484 FLU IMMUNIZE NO ADMIN: HCPCS | Performed by: INTERNAL MEDICINE

## 2023-03-14 PROCEDURE — 3017F COLORECTAL CA SCREEN DOC REV: CPT | Performed by: INTERNAL MEDICINE

## 2023-03-14 PROCEDURE — 4004F PT TOBACCO SCREEN RCVD TLK: CPT | Performed by: INTERNAL MEDICINE

## 2023-03-14 NOTE — PROGRESS NOTES
Chief Complaint   Patient presents with    Check-Up    Results    Shortness of Breath       Originally Referred by pulm  for leg edema and pre op eval      Pt here to follow up with abnormal stress test    EKG done 23    Cont to have sob on exertion , palpitation and heart burn    Prilosec helped for heartburn but still there    Leg edema for the last few week, usually at end of the day    Sob on exertion for the last 6 month and PFT nonrevealing  Denied cp, but had heart burn on otc      palpitation  daily getting worse    Dizziness on standing  in the last few days only    Smokes 1.5 ppd for 38 yrs    FHX  Borther had  CABG in his lats 40's  Father  of lung ca at age 55  Grand parent had heart ds detail unknown      Past Surgical History:   Procedure Laterality Date    BUNIONECTOMY      HEMORRHOID SURGERY      LEEP      PAIN MANAGEMENT PROCEDURE Bilateral 2022    Lumbar Facet Medial branch block Lumbar 4-5, 5-Sacral 1 bilateral performed by Da Salter MD at [facility]    PILONIDAL CYST DRAINAGE      TONSILLECTOMY      TUBAL LIGATION         Allergies   Allergen Reactions    Ibuprofen     Naproxen Rash        Family History   Problem Relation Age of Onset    Lung Cancer Father     Arthritis Neg Hx     Asthma Neg Hx     Birth Defects Neg Hx     Cancer Neg Hx     Depression Neg Hx     Diabetes Neg Hx     Early Death Neg Hx     Hearing Loss Neg Hx     Heart Disease Neg Hx     High Blood Pressure Neg Hx     High Cholesterol Neg Hx     Kidney Disease Neg Hx     Learning Disabilities Neg Hx     Mental Illness Neg Hx     Mental Retardation Neg Hx     Miscarriages / Stillbirths Neg Hx     Stroke Neg Hx     Substance Abuse Neg Hx     Vision Loss Neg Hx     Other Neg Hx         Social History     Socioeconomic History    Marital status: Legally      Spouse name: Not on file    Number of children: Not on file    Years of education: Not on file    Highest education level: Not on  file   Occupational History    Not on file   Tobacco Use    Smoking status: Every Day     Packs/day: 1.00     Years: 38.00     Pack years: 38.00     Types: Cigarettes     Start date: 1/1/1984    Smokeless tobacco: Never    Tobacco comments:     0.25 ppd 2/9/23   Substance and Sexual Activity    Alcohol use: No    Drug use: No    Sexual activity: Yes   Other Topics Concern    Not on file   Social History Narrative    Not on file     Social Determinants of Health     Financial Resource Strain: Not on file   Food Insecurity: Not on file   Transportation Needs: Not on file   Physical Activity: Not on file   Stress: Not on file   Social Connections: Not on file   Intimate Partner Violence: Not on file   Housing Stability: Not on file       Current Outpatient Medications   Medication Sig Dispense Refill    metoprolol tartrate (LOPRESSOR) 25 MG tablet Take 0.5 tablets by mouth 2 times daily 30 tablet 3    docusate sodium (COLACE) 100 MG capsule take 1 capsule by mouth twice a day if needed for constipation      albuterol sulfate HFA (VENTOLIN HFA) 108 (90 Base) MCG/ACT inhaler Inhale 2 puffs into the lungs every 4 hours as needed for Wheezing or Shortness of Breath 1 each 3    citalopram (CELEXA) 10 MG tablet Take 10 mg by mouth daily      prazosin (MINIPRESS) 1 MG capsule Take 1 mg by mouth nightly      buprenorphine-naloxone (SUBOXONE) 8-2 MG FILM SL film Place 1 Film under the tongue daily. No current facility-administered medications for this visit. Review of Systems -     General ROS: negative  Psychological ROS: negative  Hematological and Lymphatic ROS: No history of blood clots or bleeding disorder.    Respiratory ROS: no cough,  or wheezing, the rest see HPI  Cardiovascular ROS: See HPI  Gastrointestinal ROS: negative  Genito-Urinary ROS: no dysuria, trouble voiding, or hematuria  Musculoskeletal ROS: negative  Neurological ROS: no TIA or stroke symptoms  Dermatological ROS: negative      Blood pressure 134/80, pulse 74, height 4' 11\" (1.499 m), weight 170 lb 9.6 oz (77.4 kg), not currently breastfeeding. Physical Examination:    General appearance - alert, well appearing, and in no distress  HEENT- Pink conjunctiva  , Non-icteri sclera,PERRLA  Mental status - alert, oriented to person, place, and time  Neck - supple, no significant adenopathy, no JVD, or carotid bruits  Chest - clear to auscultation, no wheezes, rales or rhonchi, symmetric air entry  Heart - normal rate, regular rhythm, normal S1, S2, no murmurs, rubs, clicks or gallops  Abdomen - soft, nontender, nondistended, no masses or organomegaly  CAMPOS- no CVA or flank tenderness, no suprapubic tenderness  Neurological - alert, oriented, normal speech, no focal findings or movement disorder noted  Musculoskeletal/limbs - no joint tenderness, deformity or swelling   - peripheral pulses normal, no pedal edema, no clubbing or cyanosis  Skin - normal coloration and turgor, no rashes, no suspicious skin lesions noted  Psych- appropriate mood and affect    Lab  No results for input(s): CKTOTAL, CKMB, CKMBINDEX, TROPONINI in the last 72 hours.   CBC:   Lab Results   Component Value Date/Time    WBC 9.4 09/20/2022 04:22 PM    RBC 5.55 09/20/2022 04:22 PM    HGB 15.7 09/20/2022 04:22 PM    HCT 49.8 09/20/2022 04:22 PM    MCV 89.7 09/20/2022 04:22 PM    MCH 28.3 09/20/2022 04:22 PM    MCHC 31.5 09/20/2022 04:22 PM    RDW 12.2 09/20/2022 04:22 PM     09/20/2022 04:22 PM    MPV 9.6 09/20/2022 04:22 PM     BMP:    Lab Results   Component Value Date/Time     09/20/2022 04:22 PM    K 4.2 09/20/2022 04:22 PM     09/20/2022 04:22 PM    CO2 28 09/20/2022 04:22 PM    BUN 15 09/20/2022 04:22 PM    LABALBU 4.5 09/20/2022 04:22 PM    CREATININE 0.90 09/20/2022 04:22 PM    CALCIUM 9.9 09/20/2022 04:22 PM    GFRAA >60 09/20/2022 04:22 PM    LABGLOM >60 09/20/2022 04:22 PM    LABGLOM 77 03/07/2017 03:12 PM    GLUCOSE 83 09/20/2022 04:22 PM     Hepatic Function Panel:    Lab Results   Component Value Date/Time    ALKPHOS 67 09/20/2022 04:22 PM    ALT 21 09/20/2022 04:22 PM    AST 19 09/20/2022 04:22 PM    PROT 7.6 09/20/2022 04:22 PM    BILITOT 0.3 09/20/2022 04:22 PM    BILIDIR 0.09 06/08/2018 10:27 AM    IBILI 0.24 06/08/2018 10:27 AM    LABALBU 4.5 09/20/2022 04:22 PM     Magnesium:  No results found for: MG  Warfarin PT/INR:  No components found for: PTPATWAR, PTINRWAR  HgBA1c:  No results found for: LABA1C  FLP:    Lab Results   Component Value Date/Time    TRIG 303 09/20/2022 04:22 PM    HDL 35 09/20/2022 04:22 PM     TSH:    Lab Results   Component Value Date/Time    TSH 1.53 09/20/2022 04:22 PM     Ekg 2/27/23  Sinus  Rhythm   Low voltage in precordial leads.    -  Nonspecific T-abnormality. ABNORMAL           Conclusions      Summary   Normal left ventricle size and systolic function. Ejection fraction was   estimated at 60 %. There were no regional left ventricular wall motion   abnormalities and wall thickness was within normal limits. Signature      ----------------------------------------------------------------   Electronically signed by Alex Cardenas MD (Interpreting   physician) on 02/13/2023 at 08:28 PM   ----------------------------------------------------------------      Conclusions      Summary   Calculated gated LVEF 71 %. The T.I.D. ratio was 1.53 . There was a moderate degree of fixed myocardial perfusion defect noted in   the Anterior wall. The nuclear images is suggestive for Mild myocardial ischemia in the apex   and Anterior wall   Attenuation artifact related abnormality can not be excluded with   certainty. Recommendation   Clinical correlation is recommended. Signatures      ----------------------------------------------------------------   Electronically signed by Alex Cardenas MD (Interpreting   Cardiologist) on 03/10/2023 at 20:09      Assessment   Diagnosis Orders   1. Anginal equivalent ([address]        2. SOB (shortness of breath) on exertion        3. Abnormal nuclear stress test        4. Heartburn        5. Pre-op evaluation for for never block        6. Dizziness on standing        7. Abnormal EKG        8. Family history of premature CAD        9. hx of Bilateral leg edema at the end of the day and no edema now- dependant                Plan     Continue the current treatment and with constant vigilance to changes in symptoms and also any potential side effects. Return for care or seek medical attention immediately if symptoms got worse and/or develop new symptoms. hx of Bilateral leg edema at the end of the day and no edema now- dependant  Leg elevation  No diuretic needed    Sob on exertion  Angina equivalent  Hx of hesart burn  Abn ekg  Pre op eval for nevre blcok  Abn Xander nuc  After Taking prilosec and heartburn got better   Need cardiac cath in vew of angin equivalent , risk and abn nuc stress  Cont asa 81  Start lopressor 12.5 mg po bid    The risk and benefit of left heart cath has been explain in detail including but not limited to  Bleeding including retroperitoneal bleed 1%, infection, MI, CVA, JUAN MANUEL, Limb loss, dissection, allergic reaction,death  Each of them 1 in 2000 range. The alternative managment has been explained. Patient expressed understanding of the risk and benefit and of the alternative managment well. Patient wanted and agreed to proceed with left heart cath.   Hence we will schedule her for [facility]       Holter for palpitations daily    Dizziness/lightheadedness on standing few days  Better with Hydration advised   Bedside ortho negative  Pat on minipress for nightmare      D/w the pat the plan of care    RTC in 4 weeks      Sayda Goodrich, [facility]

## 2023-03-14 NOTE — TELEPHONE ENCOUNTER
PROCEDURE: cardiac cath     DATE OF SERVICE: 04/05/2023    SERVICE LOCATION: [facility]    CPT CODE: 17238    PHYSICIAN: shankar    DATE PRIOR AUTH SUBMITTED: 03/14/2023    STATUS:  Approved.     CASE NUMBER: [id number]    AUTH NUMBER: [id number]    VALID: 04/05/2023-07/04/2023

## 2023-04-04 ENCOUNTER — PREP FOR PROCEDURE (OUTPATIENT)
Dept: CARDIOLOGY | Age: 53
End: 2023-04-04

## 2023-04-04 RX ORDER — SODIUM CHLORIDE 0.9 % (FLUSH) 0.9 %
5-40 SYRINGE (ML) INJECTION PRN
Status: CANCELLED | OUTPATIENT
Start: 2023-04-04

## 2023-04-04 RX ORDER — NITROGLYCERIN 0.4 MG/1
0.4 TABLET SUBLINGUAL EVERY 5 MIN PRN
Status: CANCELLED | OUTPATIENT
Start: 2023-04-04

## 2023-04-04 RX ORDER — DIPHENHYDRAMINE HYDROCHLORIDE 50 MG/ML
50 INJECTION INTRAMUSCULAR; INTRAVENOUS ONCE
Status: CANCELLED | OUTPATIENT
Start: 2023-04-04 | End: 2023-04-04

## 2023-04-04 RX ORDER — SODIUM CHLORIDE 0.9 % (FLUSH) 0.9 %
5-40 SYRINGE (ML) INJECTION EVERY 12 HOURS SCHEDULED
Status: CANCELLED | OUTPATIENT
Start: 2023-04-04

## 2023-04-04 RX ORDER — SODIUM CHLORIDE 9 MG/ML
INJECTION, SOLUTION INTRAVENOUS PRN
Status: CANCELLED | OUTPATIENT
Start: 2023-04-04

## 2023-04-04 RX ORDER — ASPIRIN 325 MG
325 TABLET ORAL ONCE
Status: CANCELLED | OUTPATIENT
Start: 2023-04-04 | End: 2023-04-04

## 2023-04-05 ENCOUNTER — HOSPITAL ENCOUNTER (OUTPATIENT)
Dept: INPATIENT UNIT | Age: 53
Discharge: HOME | End: 2023-04-05
Attending: INTERNAL MEDICINE | Admitting: INTERNAL MEDICINE
Payer: MEDICAID

## 2023-04-05 VITALS
WEIGHT: 170 LBS | DIASTOLIC BLOOD PRESSURE: 62 MMHG | HEIGHT: 59 IN | OXYGEN SATURATION: 94 % | TEMPERATURE: 97.9 F | RESPIRATION RATE: 18 BRPM | BODY MASS INDEX: 34.27 KG/M2 | HEART RATE: 73 BPM | SYSTOLIC BLOOD PRESSURE: 102 MMHG

## 2023-04-05 PROBLEM — Z98.890 S/P CARDIAC CATH: Status: ACTIVE | Noted: 2023-04-05

## 2023-04-05 LAB
ABO: NORMAL
ANION GAP SERPL CALC-SCNC: 7 MEQ/L (ref 8–16)
ANTIBODY SCREEN: NORMAL
APTT PPP: 29.8 SECONDS (ref 22–38)
BUN SERPL-MCNC: 13 MG/DL (ref 7–22)
CALCIUM SERPL-MCNC: 8.9 MG/DL (ref 8.5–10.5)
CHLORIDE SERPL-SCNC: 104 MEQ/L (ref 98–111)
CO2 SERPL-SCNC: 28 MEQ/L (ref 23–33)
CREAT SERPL-MCNC: 0.9 MG/DL (ref 0.4–1.2)
DEPRECATED RDW RBC AUTO: 40.4 FL (ref 35–45)
ERYTHROCYTE [DISTWIDTH] IN BLOOD BY AUTOMATED COUNT: 12.3 % (ref 11.5–14.5)
GFR SERPL CREATININE-BSD FRML MDRD: > 60 ML/MIN/1.73M2
GLUCOSE SERPL-MCNC: 90 MG/DL (ref 70–108)
HCT VFR BLD AUTO: 44.6 % (ref 37–47)
HGB BLD-MCNC: 14.9 GM/DL (ref 12–16)
INR PPP: 0.88 (ref 0.85–1.13)
MCH RBC QN AUTO: 29.9 PG (ref 26–33)
MCHC RBC AUTO-ENTMCNC: 33.4 GM/DL (ref 32.2–35.5)
MCV RBC AUTO: 89.4 FL (ref 81–99)
PLATELET # BLD AUTO: 235 THOU/MM3 (ref 130–400)
PMV BLD AUTO: 8.9 FL (ref 9.4–12.4)
POTASSIUM SERPL-SCNC: 4.3 MEQ/L (ref 3.5–5.2)
RBC # BLD AUTO: 4.99 MILL/MM3 (ref 4.2–5.4)
RH FACTOR: NORMAL
SODIUM SERPL-SCNC: 139 MEQ/L (ref 135–145)
WBC # BLD AUTO: 9 THOU/MM3 (ref 4.8–10.8)

## 2023-04-05 PROCEDURE — 86901 BLOOD TYPING SEROLOGIC RH(D): CPT

## 2023-04-05 PROCEDURE — 2580000003 HC RX 258: Performed by: STUDENT IN AN ORGANIZED HEALTH CARE EDUCATION/TRAINING PROGRAM

## 2023-04-05 PROCEDURE — 93458 L HRT ARTERY/VENTRICLE ANGIO: CPT | Performed by: INTERNAL MEDICINE

## 2023-04-05 PROCEDURE — 93458 L HRT ARTERY/VENTRICLE ANGIO: CPT

## 2023-04-05 PROCEDURE — 86850 RBC ANTIBODY SCREEN: CPT

## 2023-04-05 PROCEDURE — 6360000004 HC RX CONTRAST MEDICATION: Performed by: INTERNAL MEDICINE

## 2023-04-05 PROCEDURE — 86900 BLOOD TYPING SEROLOGIC ABO: CPT

## 2023-04-05 PROCEDURE — 36415 COLL VENOUS BLD VENIPUNCTURE: CPT

## 2023-04-05 PROCEDURE — 93005 ELECTROCARDIOGRAM TRACING: CPT | Performed by: STUDENT IN AN ORGANIZED HEALTH CARE EDUCATION/TRAINING PROGRAM

## 2023-04-05 PROCEDURE — 85730 THROMBOPLASTIN TIME PARTIAL: CPT

## 2023-04-05 PROCEDURE — 85027 COMPLETE CBC AUTOMATED: CPT

## 2023-04-05 PROCEDURE — 6360000002 HC RX W HCPCS

## 2023-04-05 PROCEDURE — 6370000000 HC RX 637 (ALT 250 FOR IP): Performed by: INTERNAL MEDICINE

## 2023-04-05 PROCEDURE — 80048 BASIC METABOLIC PNL TOTAL CA: CPT

## 2023-04-05 PROCEDURE — 2500000003 HC RX 250 WO HCPCS

## 2023-04-05 PROCEDURE — 6370000000 HC RX 637 (ALT 250 FOR IP): Performed by: STUDENT IN AN ORGANIZED HEALTH CARE EDUCATION/TRAINING PROGRAM

## 2023-04-05 PROCEDURE — 85610 PROTHROMBIN TIME: CPT

## 2023-04-05 RX ORDER — DIPHENHYDRAMINE HYDROCHLORIDE 50 MG/ML
50 INJECTION INTRAMUSCULAR; INTRAVENOUS ONCE
Status: DISCONTINUED | OUTPATIENT
Start: 2023-04-05 | End: 2023-04-05 | Stop reason: HOSPADM

## 2023-04-05 RX ORDER — ONDANSETRON 2 MG/ML
4 INJECTION INTRAMUSCULAR; INTRAVENOUS EVERY 6 HOURS PRN
Status: DISCONTINUED | OUTPATIENT
Start: 2023-04-05 | End: 2023-04-05 | Stop reason: HOSPADM

## 2023-04-05 RX ORDER — SODIUM CHLORIDE 0.9 % (FLUSH) 0.9 %
5-40 SYRINGE (ML) INJECTION EVERY 12 HOURS SCHEDULED
Status: DISCONTINUED | OUTPATIENT
Start: 2023-04-05 | End: 2023-04-05 | Stop reason: SDUPTHER

## 2023-04-05 RX ORDER — ATORVASTATIN CALCIUM 40 MG/1
40 TABLET, FILM COATED ORAL NIGHTLY
Status: DISCONTINUED | OUTPATIENT
Start: 2023-04-05 | End: 2023-04-05 | Stop reason: HOSPADM

## 2023-04-05 RX ORDER — SODIUM CHLORIDE 9 MG/ML
INJECTION, SOLUTION INTRAVENOUS PRN
Status: DISCONTINUED | OUTPATIENT
Start: 2023-04-05 | End: 2023-04-05 | Stop reason: SDUPTHER

## 2023-04-05 RX ORDER — SODIUM CHLORIDE 0.9 % (FLUSH) 0.9 %
5-40 SYRINGE (ML) INJECTION PRN
Status: DISCONTINUED | OUTPATIENT
Start: 2023-04-05 | End: 2023-04-05 | Stop reason: SDUPTHER

## 2023-04-05 RX ORDER — ACETAMINOPHEN 325 MG/1
650 TABLET ORAL EVERY 4 HOURS PRN
Status: DISCONTINUED | OUTPATIENT
Start: 2023-04-05 | End: 2023-04-05 | Stop reason: HOSPADM

## 2023-04-05 RX ORDER — ASPIRIN 81 MG/1
81 TABLET ORAL DAILY
Qty: 30 TABLET | Refills: 3 | Status: SHIPPED | OUTPATIENT
Start: 2023-04-06

## 2023-04-05 RX ORDER — SODIUM CHLORIDE 0.9 % (FLUSH) 0.9 %
5-40 SYRINGE (ML) INJECTION EVERY 12 HOURS SCHEDULED
Status: DISCONTINUED | OUTPATIENT
Start: 2023-04-05 | End: 2023-04-05 | Stop reason: HOSPADM

## 2023-04-05 RX ORDER — NITROGLYCERIN 0.4 MG/1
0.4 TABLET SUBLINGUAL EVERY 5 MIN PRN
Status: DISCONTINUED | OUTPATIENT
Start: 2023-04-05 | End: 2023-04-05 | Stop reason: HOSPADM

## 2023-04-05 RX ORDER — ATORVASTATIN CALCIUM 40 MG/1
40 TABLET, FILM COATED ORAL NIGHTLY
Qty: 30 TABLET | Refills: 3 | Status: SHIPPED | OUTPATIENT
Start: 2023-04-05

## 2023-04-05 RX ORDER — ASPIRIN 325 MG
325 TABLET ORAL ONCE
Status: COMPLETED | OUTPATIENT
Start: 2023-04-05 | End: 2023-04-05

## 2023-04-05 RX ORDER — SODIUM CHLORIDE 9 MG/ML
INJECTION, SOLUTION INTRAVENOUS PRN
Status: DISCONTINUED | OUTPATIENT
Start: 2023-04-05 | End: 2023-04-05 | Stop reason: HOSPADM

## 2023-04-05 RX ORDER — ASPIRIN 81 MG/1
81 TABLET ORAL DAILY
Status: DISCONTINUED | OUTPATIENT
Start: 2023-04-05 | End: 2023-04-05 | Stop reason: HOSPADM

## 2023-04-05 RX ORDER — SODIUM CHLORIDE 0.9 % (FLUSH) 0.9 %
5-40 SYRINGE (ML) INJECTION PRN
Status: DISCONTINUED | OUTPATIENT
Start: 2023-04-05 | End: 2023-04-05 | Stop reason: HOSPADM

## 2023-04-05 RX ADMIN — ASPIRIN 325 MG: 325 TABLET ORAL at 11:31

## 2023-04-05 RX ADMIN — SODIUM CHLORIDE: 9 INJECTION, SOLUTION INTRAVENOUS at 11:31

## 2023-04-05 RX ADMIN — ATORVASTATIN CALCIUM 40 MG: 40 TABLET, FILM COATED ORAL at 18:03

## 2023-04-05 RX ADMIN — IOPAMIDOL 110 ML: 755 INJECTION, SOLUTION INTRAVENOUS at 14:54

## 2023-04-05 NOTE — FLOWSHEET NOTE
Patient admitted to 2E16  Ambulatory for heart cath. Patient NPO. Patient accompanied by her . Vital signs obtained. Assessment and data collection intiated. Oriented to room. Policies and procedures for 2E explained. All questions answered with no further questions at this time. Fall prevention and safety precautions discussed with patient. Explained patients right to have family, representative or physician notified of their admission. Patient has Declined for physician to be notified. Patient has Declined for family/representative to be notified.

## 2023-04-05 NOTE — H&P
angiography/PTA  []Other:      SEDATION  Planned agent:[x]Midazolam []Meperidine [x]Sublimaze []Morphine  []Diazepam  []Other:     ASA Classification:  []1 []2 [x]3 []4 []5  Class 1: A normal healthy patient  Class 2: Pt with mild to moderate systemic disease  Class 3: Severe systemic disease or disturbance  Class 4: Severe systemic disorders that are already life threatening. Class 5: Moribund pt with little chances of survival, for more than 24 hours. Mallampati I Airway Classification:   []1 [x]2 []3 []4    [x]Pre-procedure diagnostic studies complete and results available. Comment:    [x]Previous sedation/anesthesia experiences assessed. Comment:    [x]The patient is an appropriate candidate to undergo the planned procedure sedation and anesthesia. (Refer to nursing sedation/analgesia documentation record)  [x]Formulation and discussion of sedation/procedure plan, risks, and expectations with patient and/or responsible adult completed. [x]Patient examined immediately prior to the procedure.  (Refer to nursing sedation/analgesia documentation record)    Chema Vides MD  Electronically signed 4/5/2023 at 1:04 PM

## 2023-04-05 NOTE — PROCEDURES
[address] [place] [address]                            CARDIAC CATHETERIZATION    PATIENT NAME: Paolo Mcdermott                      :        1970  MED REC NO:   [id number]                           ROOM:       0016  ACCOUNT NO:   [de-identified]                           ADMIT DATE: 2023  PROVIDER:     Payal Sanchez. Caio Leon M.D.    Sheila Tafoyaaves:  2023    LEFT HEART CATHETERIZATION REPORT    INDICATION FOR CARDIAC CATHETERIZATION:  This is a 60-year-old female  patient referred by Pulmonary for leg swelling, shortness of breath and  preoperative risk assessment, and the patient was having very limiting  shortness of breath and complaints of heartburn, but denies chest pain;  but her significant other stated that the patient has chest pain, but  she does not want to talk about it, per the significant other. However,  nonetheless, she has been having limiting shortness of breath and  heartburn and so in view of that, we decided to go for functional  studies. Stress test done, stress test showed anterior and apical  ischemia and so in view of that, we decided to go for cardiac  catheterization. Pulmonary function test was nonrevealing. So, risks  and benefits explained and the patient verbalized understanding and  agreed with plan of care, required cardiac catheterization in view of  angina equivalent [place] Cardiovascular Society class III and  heartburn and per the patient's admission and abnormal nuclear stress  test.    Moreover, the patient has significant family history of premature  coronary artery disease. Brother had a bypass at age 40. Father   of lung cancer. So, multiple risk factors. PROCEDURES:  Left ventriculography, selective coronary angiogram, right  femoral angiogram, and conscious sedation.     ACCESS SITE:  Attempted to do right radial access under sonogram-guided  was not possible and basically

## 2023-04-05 NOTE — FLOWSHEET NOTE
Received from cath lab. Right femoral arterial cath site stable. Right radial site stable. No drainage or swelling. Bandaid applied to site. Pt aware to keep right leg still, head down and not to cross her legs. Taking sips of water. Denies pain or needs. 0.9 normal saline cont. External female urinary cath placed and connected to wall suction. Family at bedside. Resting with easy resp.

## 2023-04-05 NOTE — DISCHARGE INSTRUCTIONS
your doctor right away if you have unexplained muscle pain, tenderness, or weakness especially if you also have fever, unusual tiredness, and dark colored urine. Also call your doctor at once if you have:  muscle weakness in your hips, shoulders, neck, and back;  trouble lifting your arms, trouble climbing or standing;  liver problems --upper stomach pain, weakness, tired feeling, loss of appetite, dark urine, jaundice (yellowing of the skin or eyes); or  kidney problems --little or no urinating, swelling in your feet or ankles, feeling tired or short of breath. Common side effects may include:  joint pain;  stuffy nose, sore throat;  diarrhea; or  pain in your arms or legs. This is not a complete list of side effects and others may occur. Call your doctor for medical advice about side effects. You may report side effects to FDA at 8-321-FDA-4305. What other drugs will affect atorvastatin? Certain other drugs can increase your risk of serious muscle problems, and it is very important that your doctor knows if you are using any of them. Tell your doctor about all your current medicines and any you start or stop using, especially:  other cholesterol-lowering medication;  antibiotic or antifungal medicine;  birth control pills;  medicine to prevent organ transplant rejection;  heart medication; or  medicine to treat hepatitis C or HIV. This list is not complete and many other drugs may affect atorvastatin. This includes prescription and over-the-counter medicines, vitamins, and herbal products. Not all possible drug interactions are listed here. Where can I get more information? Your pharmacist can provide more information about atorvastatin. Remember, keep this and all other medicines out of the reach of children, never share your medicines with others, and use this medication only for the indication prescribed.    Every effort has been made to ensure that the information provided by [address] [place] [address] Dr FREEMAN

## 2023-04-05 NOTE — PLAN OF CARE
Problem: Discharge Planning  Goal: Discharge to home or other facility with appropriate resources  Outcome: Progressing   Pt to have heart cath today

## 2023-04-05 NOTE — PLAN OF CARE
Problem: Discharge Planning  Goal: Discharge to home or other facility with appropriate resources  4/5/2023 1516 by Derik Reaves RN  Outcome: Adequate for Discharge  4/5/2023 1506 by Derik Reaves RN  Outcome: Adequate for Discharge  Flowsheets (Taken 4/5/2023 1113)  Discharge to home or other facility with appropriate resources: Identify barriers to discharge with patient and caregiver  4/5/2023 1044 by Derik Reaves RN  Outcome: Progressing     Problem: Discharge Planning  Goal: Discharge to home or other facility with appropriate resources  4/5/2023 1516 by Derik Reaves RN  Outcome: Adequate for Discharge  4/5/2023 1506 by Derik Reaves RN  Outcome: Adequate for Discharge  Flowsheets (Taken 4/5/2023 1113)  Discharge to home or other facility with appropriate resources: Identify barriers to discharge with patient and caregiver  4/5/2023 1044 by Derik Reaves RN  Outcome: Progressing

## 2023-04-05 NOTE — BRIEF OP NOTE
[address] [address]  Sedation/Analgesia Post Sedation Record    Pt Name: Rita Crews  MRN: [id number]  YOB: 1970  Procedure Performed By: Gelacio Sauceda MD  Primary Care Physician: NATASHA Bernabe - YOHAN    POST-PROCEDURE    Physicians/Assistants: Alice Marcum  Procedure Performed: [facility]     Sedation/Anesthesia: Conscious sedation    Estimated Blood Loss: minimal  Specimens Removed:  [x]None []Other:      Disposition of Specimen:  []Pathology []Other      Complications:   [x]None Immediate []Other:     Post-procedure Diagnosis/Findings:   SEVERE TRRIPLE VESSEL CAD          Recommendations:   Optimal Medical Therapy  Risk factor modification  NEED cabg  [id number]              Tc Serra MD, [facility]  Electronically signed 4/5/2023 at 3:34 PM

## 2023-04-05 NOTE — PLAN OF CARE
Problem: Discharge Planning  Goal: Discharge to home or other facility with appropriate resources  4/5/2023 1506 by Dennie Cordoba, RN  Outcome: Adequate for Discharge  Flowsheets (Taken 4/5/2023 1113)  Discharge to home or other facility with appropriate resources: Identify barriers to discharge with patient and caregiver  4/5/2023 1044 by Dennie Cordoba, RN  Outcome: Progressing

## 2023-04-06 ENCOUNTER — OFFICE VISIT (OUTPATIENT)
Dept: CARDIOTHORACIC SURGERY | Age: 53
End: 2023-04-06
Payer: MEDICAID

## 2023-04-06 VITALS
HEART RATE: 81 BPM | DIASTOLIC BLOOD PRESSURE: 70 MMHG | OXYGEN SATURATION: 98 % | BODY MASS INDEX: 33.83 KG/M2 | SYSTOLIC BLOOD PRESSURE: 104 MMHG | WEIGHT: 167.8 LBS | HEIGHT: 59 IN

## 2023-04-06 DIAGNOSIS — I20.8 ANGINAL EQUIVALENT (HCC): Primary | ICD-10-CM

## 2023-04-06 LAB
EKG ATRIAL RATE: 77 BPM
EKG P AXIS: 47 DEGREES
EKG P-R INTERVAL: 150 MS
EKG Q-T INTERVAL: 390 MS
EKG QRS DURATION: 76 MS
EKG QTC CALCULATION (BAZETT): 441 MS
EKG R AXIS: 36 DEGREES
EKG T AXIS: 86 DEGREES
EKG VENTRICULAR RATE: 77 BPM

## 2023-04-06 PROCEDURE — 3017F COLORECTAL CA SCREEN DOC REV: CPT | Performed by: THORACIC SURGERY (CARDIOTHORACIC VASCULAR SURGERY)

## 2023-04-06 PROCEDURE — 99205 OFFICE O/P NEW HI 60 MIN: CPT | Performed by: THORACIC SURGERY (CARDIOTHORACIC VASCULAR SURGERY)

## 2023-04-06 PROCEDURE — 4004F PT TOBACCO SCREEN RCVD TLK: CPT | Performed by: THORACIC SURGERY (CARDIOTHORACIC VASCULAR SURGERY)

## 2023-04-06 PROCEDURE — 93010 ELECTROCARDIOGRAM REPORT: CPT | Performed by: NUCLEAR MEDICINE

## 2023-04-06 PROCEDURE — G8417 CALC BMI ABV UP PARAM F/U: HCPCS | Performed by: THORACIC SURGERY (CARDIOTHORACIC VASCULAR SURGERY)

## 2023-04-06 PROCEDURE — G8428 CUR MEDS NOT DOCUMENT: HCPCS | Performed by: THORACIC SURGERY (CARDIOTHORACIC VASCULAR SURGERY)

## 2023-04-06 ASSESSMENT — ENCOUNTER SYMPTOMS
EYE DISCHARGE: 0
COLOR CHANGE: 0
SHORTNESS OF BREATH: 1
ABDOMINAL DISTENTION: 0
BACK PAIN: 1

## 2023-04-06 NOTE — PROGRESS NOTES
tablet, Rfl: 3    Allergies   Allergen Reactions    Ibuprofen     Naproxen Rash       Past Medical History  Lo Mullen  has a past medical history of Abnormal Pap smear of cervix, Chronic back pain, Chronic kidney disease, Endometriosis, Hip pain, MRSA (methicillin resistant staph aureus) culture positive, Obesity, and Pneumonia. Social History  Lo Mullen  reports that she has been smoking cigarettes. She started smoking about 39 years ago. She has a 19.00 pack-year smoking history. She has never used smokeless tobacco. She reports that she does not drink alcohol and does not use drugs. Family History  Lo Mullen family history includes Lung Cancer in her father. There is no family history of bicuspid aortic valve, aneurysms, heart transplant, pacemakers, defibrillators, or sudden cardiac death. Past Surgical History   Past Surgical History:   Procedure Laterality Date    BUNIONECTOMY      CARPAL TUNNEL RELEASE Right     HEMORRHOID SURGERY      LEEP      PAIN MANAGEMENT PROCEDURE Bilateral 12/06/2022    Lumbar Facet Medial branch block Lumbar 4-5, 5-Sacral 1 bilateral performed by Jory Lutz MD at [address]         Subjective:     Review of Systems   Constitutional:  Positive for activity change and fatigue. HENT:  Negative for dental problem. Eyes:  Negative for discharge. Respiratory:  Positive for shortness of breath. Cardiovascular:  Positive for leg swelling. Negative for chest pain. Gastrointestinal:  Negative for abdominal distention. Endocrine: Negative for cold intolerance. Genitourinary:  Negative for difficulty urinating. Musculoskeletal:  Positive for back pain. Skin:  Negative for color change. Allergic/Immunologic: Negative for environmental allergies. Neurological:  Negative for dizziness. Hematological:  Negative for adenopathy. Psychiatric/Behavioral:  Negative for agitation.

## 2023-04-06 NOTE — PROGRESS NOTES
Patient given discharge instructions and discussed with patient and significant other at bedside. Pt has no further questions at this time. Belongings gathered. Pt leaving for home with significant other.

## 2023-04-13 PROBLEM — Z01.818 PRE-OP EVALUATION: Status: RESOLVED | Noted: 2023-02-27 | Resolved: 2023-04-13

## 2023-04-20 ENCOUNTER — OFFICE VISIT (OUTPATIENT)
Dept: CARDIOLOGY CLINIC | Age: 53
End: 2023-04-20

## 2023-04-20 VITALS
HEIGHT: 59 IN | SYSTOLIC BLOOD PRESSURE: 113 MMHG | WEIGHT: 170.6 LBS | DIASTOLIC BLOOD PRESSURE: 68 MMHG | HEART RATE: 67 BPM | BODY MASS INDEX: 34.39 KG/M2

## 2023-04-20 DIAGNOSIS — I20.8 ANGINAL EQUIVALENT (HCC): ICD-10-CM

## 2023-04-20 DIAGNOSIS — I25.10 TRIPLE VESSEL CORONARY ARTERY DISEASE: Primary | ICD-10-CM

## 2023-04-20 DIAGNOSIS — Z98.890 S/P CARDIAC CATH: ICD-10-CM

## 2023-04-20 DIAGNOSIS — R60.0 BILATERAL LEG EDEMA: ICD-10-CM

## 2023-04-20 DIAGNOSIS — R94.31 ABNORMAL EKG: ICD-10-CM

## 2023-04-20 DIAGNOSIS — R42 DIZZINESS ON STANDING: ICD-10-CM

## 2023-04-20 DIAGNOSIS — R06.02 SOB (SHORTNESS OF BREATH) ON EXERTION: ICD-10-CM

## 2023-04-20 DIAGNOSIS — Z82.49 FAMILY HISTORY OF PREMATURE CAD: ICD-10-CM

## 2023-04-20 DIAGNOSIS — Z72.0 TOBACCO ABUSE: ICD-10-CM

## 2023-04-20 NOTE — PROGRESS NOTES
Anterior wall. The nuclear images is suggestive for Mild myocardial ischemia in the apex   and Anterior wall   Attenuation artifact related abnormality can not be excluded with   certainty. Recommendation   Clinical correlation is recommended. Signatures      ----------------------------------------------------------------   Electronically signed by Samaria Mckeon MD (Interpreting   Cardiologist) on 03/10/2023 at 20:09      Assessment   Diagnosis Orders   1. Triple vessel coronary artery disease-  awaiting  CABG        2. Anginal equivalent ([address]        3. SOB (shortness of breath) on exertion        4. Abnormal EKG  EKG 12 Lead      5. Dizziness on standing better        6. S/P cardiac cath Severe LEFT MAIN , LAD AND LCX STENOSIS, EDP 24, ANTEROLATERAL ANEURYSM NOTED ef 50- NEED cabg        7. Tobacco abuse        8. hx of Bilateral leg edema at the end of the day and no edema now- dependant        9. Family history of premature CAD                Plan     Meds and labs reviewed    Continue the current treatment and with constant vigilance to changes in symptoms and also any potential side effects. Return for care or seek medical attention immediately if symptoms got worse and/or develop new symptoms.     hx of Bilateral leg edema at the end of the day and no edema now- dependant  Leg elevation  No diuretic needed    CAD triple vessel- awaiting for CABG  Sob on exertion  Angina equivalent  Hx of hesart burn  Abn ekg    After Taking prilosec and heartburn got better   Had  cardiac cath in vew of angin equivalent , risk and abn nuc stress  Cont asa 81  Cont  lopressor 12.5 mg po bid  Cath site good       Holter for palpitations daily    Dizziness/lightheadedness on standing few days  Better with Hydration advised   Bedside ortho negative  Pat on minipress for nightmare    D/w the pat the plan of care    RTC in 2 months      [facility]

## 2023-05-01 ENCOUNTER — TELEPHONE (OUTPATIENT)
Dept: CARDIOLOGY CLINIC | Age: 53
End: 2023-05-01

## 2023-05-01 NOTE — TELEPHONE ENCOUNTER
Pt was prescribed Nicotine patches, 21 mg/24hr. She would like to make sure Dr Ashley Gambino is okay with her using the patches. Please advise.

## 2023-05-12 SDOH — HEALTH STABILITY: PHYSICAL HEALTH: ON AVERAGE, HOW MANY DAYS PER WEEK DO YOU ENGAGE IN MODERATE TO STRENUOUS EXERCISE (LIKE A BRISK WALK)?: 3 DAYS

## 2023-05-12 SDOH — HEALTH STABILITY: PHYSICAL HEALTH: ON AVERAGE, HOW MANY MINUTES DO YOU ENGAGE IN EXERCISE AT THIS LEVEL?: 30 MIN

## 2023-05-12 ASSESSMENT — SOCIAL DETERMINANTS OF HEALTH (SDOH)
WITHIN THE LAST YEAR, HAVE YOU BEEN KICKED, HIT, SLAPPED, OR OTHERWISE PHYSICALLY HURT BY YOUR PARTNER OR EX-PARTNER?: NO
WITHIN THE LAST YEAR, HAVE YOU BEEN AFRAID OF YOUR PARTNER OR EX-PARTNER?: NO
WITHIN THE LAST YEAR, HAVE TO BEEN RAPED OR FORCED TO HAVE ANY KIND OF SEXUAL ACTIVITY BY YOUR PARTNER OR EX-PARTNER?: NO
WITHIN THE LAST YEAR, HAVE YOU BEEN HUMILIATED OR EMOTIONALLY ABUSED IN OTHER WAYS BY YOUR PARTNER OR EX-PARTNER?: NO

## 2023-05-15 ENCOUNTER — OFFICE VISIT (OUTPATIENT)
Dept: INTERNAL MEDICINE CLINIC | Age: 53
End: 2023-05-15
Payer: MEDICAID

## 2023-05-15 VITALS
SYSTOLIC BLOOD PRESSURE: 112 MMHG | DIASTOLIC BLOOD PRESSURE: 68 MMHG | HEART RATE: 74 BPM | BODY MASS INDEX: 34.58 KG/M2 | OXYGEN SATURATION: 95 % | WEIGHT: 171.2 LBS | RESPIRATION RATE: 18 BRPM | TEMPERATURE: 98.2 F

## 2023-05-15 DIAGNOSIS — K21.9 GASTROESOPHAGEAL REFLUX DISEASE, UNSPECIFIED WHETHER ESOPHAGITIS PRESENT: ICD-10-CM

## 2023-05-15 DIAGNOSIS — E66.9 OBESITY, UNSPECIFIED CLASSIFICATION, UNSPECIFIED OBESITY TYPE, UNSPECIFIED WHETHER SERIOUS COMORBIDITY PRESENT: Primary | ICD-10-CM

## 2023-05-15 DIAGNOSIS — M47.816 LUMBAR SPONDYLOSIS: ICD-10-CM

## 2023-05-15 DIAGNOSIS — I25.118 CORONARY ARTERY DISEASE INVOLVING NATIVE CORONARY ARTERY OF NATIVE HEART WITH OTHER FORM OF ANGINA PECTORIS (HCC): ICD-10-CM

## 2023-05-15 DIAGNOSIS — R13.19 ESOPHAGEAL DYSPHAGIA: ICD-10-CM

## 2023-05-15 DIAGNOSIS — Z72.0 TOBACCO ABUSE: ICD-10-CM

## 2023-05-15 DIAGNOSIS — N18.2 STAGE 2 CHRONIC KIDNEY DISEASE: ICD-10-CM

## 2023-05-15 PROCEDURE — G8427 DOCREV CUR MEDS BY ELIG CLIN: HCPCS | Performed by: STUDENT IN AN ORGANIZED HEALTH CARE EDUCATION/TRAINING PROGRAM

## 2023-05-15 PROCEDURE — 99204 OFFICE O/P NEW MOD 45 MIN: CPT | Performed by: STUDENT IN AN ORGANIZED HEALTH CARE EDUCATION/TRAINING PROGRAM

## 2023-05-15 PROCEDURE — 4004F PT TOBACCO SCREEN RCVD TLK: CPT | Performed by: STUDENT IN AN ORGANIZED HEALTH CARE EDUCATION/TRAINING PROGRAM

## 2023-05-15 PROCEDURE — G8417 CALC BMI ABV UP PARAM F/U: HCPCS | Performed by: STUDENT IN AN ORGANIZED HEALTH CARE EDUCATION/TRAINING PROGRAM

## 2023-05-15 PROCEDURE — 3017F COLORECTAL CA SCREEN DOC REV: CPT | Performed by: STUDENT IN AN ORGANIZED HEALTH CARE EDUCATION/TRAINING PROGRAM

## 2023-05-15 PROCEDURE — 83036 HEMOGLOBIN GLYCOSYLATED A1C: CPT | Performed by: STUDENT IN AN ORGANIZED HEALTH CARE EDUCATION/TRAINING PROGRAM

## 2023-05-15 RX ORDER — LIDOCAINE 4 G/G
1 PATCH TOPICAL DAILY
Qty: 30 PATCH | Refills: 0 | Status: CANCELLED | OUTPATIENT
Start: 2023-05-15 | End: 2023-06-14

## 2023-05-15 RX ORDER — PANTOPRAZOLE SODIUM 40 MG/1
40 TABLET, DELAYED RELEASE ORAL
Qty: 30 TABLET | Refills: 0 | Status: SHIPPED | OUTPATIENT
Start: 2023-05-15

## 2023-05-15 SDOH — ECONOMIC STABILITY: FOOD INSECURITY: WITHIN THE PAST 12 MONTHS, THE FOOD YOU BOUGHT JUST DIDN'T LAST AND YOU DIDN'T HAVE MONEY TO GET MORE.: NEVER TRUE

## 2023-05-15 SDOH — ECONOMIC STABILITY: FOOD INSECURITY: WITHIN THE PAST 12 MONTHS, YOU WORRIED THAT YOUR FOOD WOULD RUN OUT BEFORE YOU GOT MONEY TO BUY MORE.: NEVER TRUE

## 2023-05-15 SDOH — ECONOMIC STABILITY: HOUSING INSECURITY
IN THE LAST 12 MONTHS, WAS THERE A TIME WHEN YOU DID NOT HAVE A STEADY PLACE TO SLEEP OR SLEPT IN A SHELTER (INCLUDING NOW)?: NO

## 2023-05-15 SDOH — ECONOMIC STABILITY: INCOME INSECURITY: HOW HARD IS IT FOR YOU TO PAY FOR THE VERY BASICS LIKE FOOD, HOUSING, MEDICAL CARE, AND HEATING?: NOT HARD AT ALL

## 2023-05-15 ASSESSMENT — PATIENT HEALTH QUESTIONNAIRE - PHQ9
SUM OF ALL RESPONSES TO PHQ QUESTIONS 1-9: 0
1. LITTLE INTEREST OR PLEASURE IN DOING THINGS: 0
SUM OF ALL RESPONSES TO PHQ QUESTIONS 1-9: 0
SUM OF ALL RESPONSES TO PHQ QUESTIONS 1-9: 0
2. FEELING DOWN, DEPRESSED OR HOPELESS: 0
SUM OF ALL RESPONSES TO PHQ QUESTIONS 1-9: 0
SUM OF ALL RESPONSES TO PHQ9 QUESTIONS 1 & 2: 0

## 2023-05-15 ASSESSMENT — ENCOUNTER SYMPTOMS
SINUS PRESSURE: 0
WHEEZING: 0
COUGH: 0
SHORTNESS OF BREATH: 0
SINUS PAIN: 0
NAUSEA: 0
ABDOMINAL PAIN: 1
DIARRHEA: 0
VOMITING: 0
SORE THROAT: 0
EYE ITCHING: 0
CONSTIPATION: 1
BACK PAIN: 0
BLOOD IN STOOL: 0
EYE PAIN: 0
RHINORRHEA: 0

## 2023-05-15 NOTE — PROGRESS NOTES
1970    Chief Complaint   Patient presents with    New Patient     diabetes     Opening statement:  Patient is a 55-year-old female current quarter pack a day smoker who started smoking in 1984 with a 19-pack-year history with a past medical history of \"abnormal Pap smear of cervix\" with LEEP, TIA, personal history of opiate abuse on Suboxone, orthostatic dizziness, triple-vessel coronary artery disease and anterior lateral aneurysm of moderate size awaiting CABG, GERD, lumbar spondylosis, bilateral lumbar facet medial branch block on lumbar L4-L5 and L5-sacral on 12/6/2022, cervical spinal stenosis, personal history of hidradenitis suppurativa, stage II CKD, hip pain, MRSA colonization of the urine, morbid obesity, right-sided carpal tunnel release, and bunionectomy who presents to our office for possible new onset of diabetes. Questionable Type 2 Diabetes:  Per patient report, got a screening hemoglobin A1c in 2022 which is 7.1%. However, hemoglobin A1c on 5/52,023 of 5.4% is not consistent with diabetes or prediabetes. Patient is not currently on any diabetic medication. Tobacco Abuse:  Patient has smoked since 1984, smoked approximately half a pack a day to repeat, has a 19-pack-year history, and currently smokes approximately quarter pack a day. Patient was extensively counseled regards to tobacco cessation. Patient was offered Chantix but declined because she already has nightmares and is concerned that this medication would exacerbate them. Patient is also concerned about personality changes as she had seen this and people that she knew who took Chantix. Currently on nicotine patch 21 mg every 24 hours. Patient states that she reduce this to every other day because she got palpitations from the full dose patch. Dr. Fay Rajput, the cardiologist, confirm that he was okay with the patient being on a nicotine patch.   Patient affirmed that she would like to receive a prescription for a lower dose

## 2023-05-24 ENCOUNTER — TELEPHONE (OUTPATIENT)
Dept: CARDIOLOGY CLINIC | Age: 53
End: 2023-05-24

## 2023-05-24 NOTE — TELEPHONE ENCOUNTER
Pre op Risk Assessment    Procedure EGD with Dilation  Physician [facility]  Date of surgery/procedure TBD    Last OV 4-20-23  Last Stress 3-10-23  Last Echo 2-13-23  Last Cath 4-5-23  Last Stent None in Epic--needs CABG per Cath Report & Dr. Joe Blank Note  Is patient on blood thinners ASA  Hold Meds/how many days ?     Fax: [phone] attn [facility] [place]

## 2023-05-25 NOTE — TELEPHONE ENCOUNTER
From my note  \"CAD triple vessel- awaiting for CABG  Sob on exertion  Angina equivalent  Hx of hesart burn  Abn ekg\"     Dr Santacruz Cos note state that pat will discuss with family to get back to him for surgery  Recommended to have the CABG asap and just remind and let him make his decision  For EGD and dilatation  Recommended to have cABG prior   Cont asa   May proceed with mod risk of cardiac event like   The surgeon or gi doctor should be aware of the triple vessel CAD that need CABG

## 2023-06-21 ENCOUNTER — OFFICE VISIT (OUTPATIENT)
Dept: INTERNAL MEDICINE CLINIC | Age: 53
End: 2023-06-21
Payer: MEDICAID

## 2023-06-21 VITALS
HEART RATE: 72 BPM | BODY MASS INDEX: 35.12 KG/M2 | HEIGHT: 59 IN | TEMPERATURE: 97.6 F | WEIGHT: 174.2 LBS | DIASTOLIC BLOOD PRESSURE: 72 MMHG | SYSTOLIC BLOOD PRESSURE: 120 MMHG

## 2023-06-21 DIAGNOSIS — G47.00 INSOMNIA, UNSPECIFIED TYPE: ICD-10-CM

## 2023-06-21 DIAGNOSIS — Z72.0 TOBACCO ABUSE: ICD-10-CM

## 2023-06-21 DIAGNOSIS — E66.9 OBESITY (BMI 30-39.9): Primary | ICD-10-CM

## 2023-06-21 PROBLEM — M48.061 SPINAL STENOSIS, LUMBAR REGION WITHOUT NEUROGENIC CLAUDICATION: Status: ACTIVE | Noted: 2022-12-06

## 2023-06-21 PROCEDURE — 4004F PT TOBACCO SCREEN RCVD TLK: CPT

## 2023-06-21 PROCEDURE — G8417 CALC BMI ABV UP PARAM F/U: HCPCS

## 2023-06-21 PROCEDURE — 99213 OFFICE O/P EST LOW 20 MIN: CPT

## 2023-06-21 PROCEDURE — 3017F COLORECTAL CA SCREEN DOC REV: CPT

## 2023-06-21 PROCEDURE — G8427 DOCREV CUR MEDS BY ELIG CLIN: HCPCS

## 2023-06-21 RX ORDER — NICOTINE 21 MG/24HR
PATCH, TRANSDERMAL 24 HOURS TRANSDERMAL
COMMUNITY
Start: 2023-04-27 | End: 2023-06-21

## 2023-06-21 RX ORDER — ZOLPIDEM TARTRATE 10 MG/1
10 TABLET ORAL NIGHTLY PRN
Qty: 15 TABLET | Refills: 0 | Status: SHIPPED | OUTPATIENT
Start: 2023-06-21 | End: 2023-07-06

## 2023-06-21 ASSESSMENT — ENCOUNTER SYMPTOMS
ALLERGIC/IMMUNOLOGIC NEGATIVE: 1
RESPIRATORY NEGATIVE: 1
SHORTNESS OF BREATH: 0
VOMITING: 0
DIARRHEA: 0
NAUSEA: 0
CONSTIPATION: 0
GASTROINTESTINAL NEGATIVE: 1
EYES NEGATIVE: 1
ABDOMINAL PAIN: 0
COUGH: 0

## 2023-06-21 NOTE — PROGRESS NOTES
Tashi Neves (:  1970) is a 46 y.o. female,Established patient, here for evaluation of the following chief complaint(s):  Obesity (4 weeks / labs completed), Coronary Artery Disease, Chronic Kidney Disease, and Other (Esophageal dysphagia - referred to [facility] )         ASSESSMENT/PLAN:  1. Obesity (BMI 30-39.9)  2. Insomnia, unspecified type  -     zolpidem (AMBIEN) 10 MG tablet; Take 1 tablet by mouth nightly as needed for Sleep for up to 15 days. Max Daily Amount: 10 mg, Disp-15 tablet, R-0Normal  3. Tobacco abuse  -     nicotine (NICODERM CQ) 7 MG/24HR; Place 1 patch onto the skin daily, Disp-30 patch, R-0Normal    Obesity: BMI 35. 18. Patient had follow-up labs for assessment of potential associated diabetes. C-peptide, fasting insulin, UA, and microalbumin/creatinine ratio were all normal. Patient has a habit of waking up at night every hour to 2 hours and going to eat food at night, which is likely the primary factor behind her obesity. She is scheduled to see the dietitian in July. - Unfortunately, due to high demand and short supply, patient will not likely be able to obtain pharmacologic therapy for obesity such as Ozempic or Trulicity in the absence of associated diabetes. We will attempt to obtain pharmacologic therapy whenever it is available. - Patient is not a good candidate for Adipex-P therapy due to addictive qualities in the context of her suboxone therapy. - Continue with dietitian appointment. - Treatment of insomnia and associated binge-eating described below. Insomnia: Patient reports habit of waking up every 1-2 hours at night and going to the fridge to eat food, which is likely a primary contributor to her obesity. She reports poor sleep in general, and believes that she will not binge-eat if she is able to remain asleep. Not associated with urinary frequency or other urinary problems.  The patient has attempted and failed trazodone and melatonin therapy in the past.  -

## 2023-06-22 ENCOUNTER — HOSPITAL ENCOUNTER (OUTPATIENT)
Dept: INTERVENTIONAL RADIOLOGY/VASCULAR | Age: 53
Discharge: HOME | End: 2023-06-22
Attending: THORACIC SURGERY (CARDIOTHORACIC VASCULAR SURGERY)
Payer: MEDICAID

## 2023-06-22 DIAGNOSIS — Z01.818 PRE-OP EXAMINATION: ICD-10-CM

## 2023-06-22 PROCEDURE — 93880 EXTRACRANIAL BILAT STUDY: CPT

## 2023-06-22 PROCEDURE — 93971 EXTREMITY STUDY: CPT

## 2023-06-23 ENCOUNTER — TELEPHONE (OUTPATIENT)
Dept: CARDIOTHORACIC SURGERY | Age: 53
End: 2023-06-23

## 2023-06-26 ENCOUNTER — OFFICE VISIT (OUTPATIENT)
Dept: CARDIOLOGY CLINIC | Age: 53
End: 2023-06-26
Payer: MEDICAID

## 2023-06-26 VITALS
BODY MASS INDEX: 35.36 KG/M2 | SYSTOLIC BLOOD PRESSURE: 112 MMHG | WEIGHT: 175.4 LBS | HEART RATE: 75 BPM | DIASTOLIC BLOOD PRESSURE: 78 MMHG | HEIGHT: 59 IN

## 2023-06-26 DIAGNOSIS — Z82.49 FAMILY HISTORY OF PREMATURE CAD: ICD-10-CM

## 2023-06-26 DIAGNOSIS — Z72.0 TOBACCO ABUSE: ICD-10-CM

## 2023-06-26 DIAGNOSIS — Z98.890 S/P CARDIAC CATH: ICD-10-CM

## 2023-06-26 DIAGNOSIS — E78.5 DYSLIPIDEMIA: ICD-10-CM

## 2023-06-26 DIAGNOSIS — I20.8 ANGINAL EQUIVALENT (HCC): ICD-10-CM

## 2023-06-26 DIAGNOSIS — R42 DIZZINESS ON STANDING: ICD-10-CM

## 2023-06-26 DIAGNOSIS — R06.02 SOB (SHORTNESS OF BREATH) ON EXERTION: ICD-10-CM

## 2023-06-26 DIAGNOSIS — R94.31 ABNORMAL EKG: ICD-10-CM

## 2023-06-26 DIAGNOSIS — I25.10 TRIPLE VESSEL CORONARY ARTERY DISEASE: Primary | ICD-10-CM

## 2023-06-26 PROCEDURE — G8417 CALC BMI ABV UP PARAM F/U: HCPCS | Performed by: INTERNAL MEDICINE

## 2023-06-26 PROCEDURE — G8427 DOCREV CUR MEDS BY ELIG CLIN: HCPCS | Performed by: INTERNAL MEDICINE

## 2023-06-26 PROCEDURE — 4004F PT TOBACCO SCREEN RCVD TLK: CPT | Performed by: INTERNAL MEDICINE

## 2023-06-26 PROCEDURE — 99214 OFFICE O/P EST MOD 30 MIN: CPT | Performed by: INTERNAL MEDICINE

## 2023-06-26 PROCEDURE — 3017F COLORECTAL CA SCREEN DOC REV: CPT | Performed by: INTERNAL MEDICINE

## 2023-07-17 NOTE — TELEPHONE ENCOUNTER
Called pt gave test results/education/ recommendations per providers  directions  pt states understanding      From my note  \"CAD triple vessel- awaiting for CABG  Sob on exertion  Angina equivalent  Hx of hesart burn  Abn ekg\"    Dr Elvis Gold note state that pat will discuss with family to get back to him for surgery  Recommended to have the CABG asap and just remind and let him make his decision  For EGD and silatation

## 2023-07-20 ENCOUNTER — CLINICAL DOCUMENTATION (OUTPATIENT)
Dept: SPIRITUAL SERVICES | Facility: CLINIC | Age: 53
End: 2023-07-20

## 2023-07-20 ENCOUNTER — PREP FOR PROCEDURE (OUTPATIENT)
Dept: CARDIOTHORACIC SURGERY | Age: 53
End: 2023-07-20

## 2023-07-20 DIAGNOSIS — R06.02 SOB (SHORTNESS OF BREATH) ON EXERTION: Primary | ICD-10-CM

## 2023-07-20 RX ORDER — SODIUM CHLORIDE 0.9 % (FLUSH) 0.9 %
5-40 SYRINGE (ML) INJECTION EVERY 12 HOURS SCHEDULED
Status: CANCELLED | OUTPATIENT
Start: 2023-07-20

## 2023-07-20 RX ORDER — SODIUM CHLORIDE 9 MG/ML
INJECTION, SOLUTION INTRAVENOUS CONTINUOUS
Status: CANCELLED | OUTPATIENT
Start: 2023-08-18

## 2023-07-20 RX ORDER — SODIUM CHLORIDE 0.9 % (FLUSH) 0.9 %
5-40 SYRINGE (ML) INJECTION PRN
Status: CANCELLED | OUTPATIENT
Start: 2023-07-20

## 2023-07-20 RX ORDER — SODIUM CHLORIDE 9 MG/ML
INJECTION, SOLUTION INTRAVENOUS PRN
Status: CANCELLED | OUTPATIENT
Start: 2023-07-20

## 2023-07-20 NOTE — PROGRESS NOTES
The  had a telephone conversation with Nayeli Tony concerning Advance Care Planning. Nayeli Tony was scheduled to come in on Tuesday 7/18/23 but she had to cancel due to transportation issues. I shared with her the importance of completing her [facility] documents and at the end of our conversation she stated that she would like come in possibly on August 1st. I mailed a copy of the Advance Directive documents on 7/18/23. She stated that she would complete the forms after reviewed.

## 2023-07-26 PROCEDURE — APPSS30 APP SPLIT SHARED TIME 16-30 MINUTES: Performed by: PHYSICIAN ASSISTANT

## 2023-07-27 ENCOUNTER — CLINICAL DOCUMENTATION (OUTPATIENT)
Dept: SPIRITUAL SERVICES | Facility: CLINIC | Age: 53
End: 2023-07-27

## 2023-07-27 NOTE — ACP (ADVANCE CARE PLANNING)
Advance Care Planning   Ambulatory ACP Specialist Patient Outreach    Date:  7/27/2023  ACP Specialist:  Ginny Jhaveri    Outreach call to patient in follow-up to ACP Specialist referral from: Patient    [] PCP  [] Provider   [] Ambulatory Care Management [x] Other for Reason:    [x] Advance Directive Assistance  [] Code Status Discussion  [] Complete Portable DNR Order  [x] Discuss Goals of Care  [] Complete POST/MOST  [] Early ACP Decision-Making  [] Other    Date Referral Received: 7/27/2023    Today's Outreach:  [x] First   [] Second  [] Third                               Third outreach made by []  phone  [] email []   [email or web address]     Intervention:  [x] Spoke with Patient  [] Left VM requesting return call      Outcome:  received a telephone call from Arleth requesting support in the completion of Advance Directives documents as part of an [address] [place] [address] conversation. Arleth had prior spoken with Biancamichael Stewart, and had received documents via mail for review.  briefly explained documents for clarity and greater understanding, as well as information needed for completion. An appointment is scheduled on 8/2 at [facility]. Next Step:   [x] ACP scheduled conversation  [] Outreach again in one week               [] Email / Mail ACP Info Sheets  [] Email / Mail Advance Directive            [] Close Referral. Routing closure to referring provider/staff and to ACP Specialist . [] Closure Letter mailed to Patient with Invitation to Contact ACP Specialist if/when ready.     Thank you for this referral.

## 2023-07-28 RX ORDER — ATORVASTATIN CALCIUM 40 MG/1
40 TABLET, FILM COATED ORAL NIGHTLY
Qty: 90 TABLET | Refills: 3 | Status: SHIPPED | OUTPATIENT
Start: 2023-07-28

## 2023-07-28 RX ORDER — ASPIRIN 81 MG/1
81 TABLET ORAL DAILY
Qty: 90 TABLET | Refills: 3 | Status: SHIPPED | OUTPATIENT
Start: 2023-07-28

## 2023-08-02 ENCOUNTER — CLINICAL DOCUMENTATION (OUTPATIENT)
Dept: SPIRITUAL SERVICES | Facility: CLINIC | Age: 53
End: 2023-08-02

## 2023-08-02 NOTE — ACP (ADVANCE CARE PLANNING)
Advance Care Planning   Advance Care Planning Note  Ambulatory [facility] Care Services    Date:  8/2/2023    Received request from patient. Consultation conversation participants:   Patient who understands ACP conversation     Goals of ACP Conversation:  Discuss advance care planning documents  Facilitate a discussion related to patient's goals of care as they align with the patient's values and beliefs. Health Care Decision Makers:      Primary Decision Maker: Srinivas Gomez - Domestic Partner - [phone]    Secondary Decision Maker: Khari Lomas - Child - [phone]    Supplemental (Other) Decision Maker: London Ortiz - Child - [phone]   Summary:  Completed [address]    Advance Care Planning Documents (Patient Wishes)  Currently on file:   Healthcare Power of /Advance Directive Appointment of [address] [place]  Living Will/Advance Directive    Assessment:    met with Sophia Marck to provide support for the completion of Advance Directives documents as part of an [facility] conversation. She was alert and oriented with decision-making capacity to express her wishes at this time. Interventions:  Provided education on documents for clarity and greater understanding  Assisted in the completion of documents according to patient's wishes at this time  Encouraged ongoing ACP conversation with future decision makers and loved ones    Care Preferences Communicated:  Ventilation:   If the patient, in their present state of health, suddenly became very ill and unable to breathe on their own,     the patient would desire the use of a ventilator (breathing machine). If their health worsens and it becomes clear that the change of recovery is unlikely,     the patient would desire the use of a ventilator (breathing machine).     Resuscitation:  In the event the patient's heart stopped as a result of an underlying serious health condition, the

## 2023-08-03 ENCOUNTER — CLINICAL DOCUMENTATION (OUTPATIENT)
Dept: SPIRITUAL SERVICES | Facility: CLINIC | Age: 53
End: 2023-08-03

## 2023-08-03 NOTE — PROGRESS NOTES
Directive to the Odalis Sharma on 7/18/23 and she stated that she completed the documents with Miles Gregg. She also stated that she will have a double-bypass surgery on Aug 14, 2023. I offered prayer and words of encouragement. Odalis Sharma, was very grateful.

## 2023-08-11 NOTE — PROGRESS NOTES
I notified Lilia Conner MA that we need a type and crossmatch order for RBCs for Open heart Visit on Monday 8/14/23. Thank you.

## 2023-08-14 ENCOUNTER — HOSPITAL ENCOUNTER (OUTPATIENT)
Dept: PREADMISSION TESTING | Age: 53
Discharge: HOME | End: 2023-08-18
Payer: MEDICAID

## 2023-08-14 ENCOUNTER — HOSPITAL ENCOUNTER (OUTPATIENT)
Dept: GENERAL RADIOLOGY | Age: 53
Discharge: HOME | End: 2023-08-14
Payer: MEDICAID

## 2023-08-14 VITALS
WEIGHT: 174.2 LBS | HEART RATE: 82 BPM | RESPIRATION RATE: 18 BRPM | SYSTOLIC BLOOD PRESSURE: 128 MMHG | DIASTOLIC BLOOD PRESSURE: 78 MMHG | HEIGHT: 59 IN | TEMPERATURE: 98.2 F | BODY MASS INDEX: 35.12 KG/M2

## 2023-08-14 DIAGNOSIS — R06.02 SOB (SHORTNESS OF BREATH) ON EXERTION: ICD-10-CM

## 2023-08-14 DIAGNOSIS — Z01.818 PRE-OP EXAMINATION: Primary | ICD-10-CM

## 2023-08-14 LAB
ABO: NORMAL
ANION GAP SERPL CALC-SCNC: 12 MEQ/L (ref 8–16)
ANTIBODY SCREEN: NORMAL
BILIRUB UR QL STRIP: NEGATIVE
BUN SERPL-MCNC: 11 MG/DL (ref 7–22)
CALCIUM SERPL-MCNC: 9.7 MG/DL (ref 8.5–10.5)
CHARACTER UR: CLEAR
CHLORIDE SERPL-SCNC: 104 MEQ/L (ref 98–111)
CO2 SERPL-SCNC: 26 MEQ/L (ref 23–33)
COLOR UR: YELLOW
CREAT SERPL-MCNC: 0.8 MG/DL (ref 0.4–1.2)
DEPRECATED MEAN GLUCOSE BLD GHB EST-ACNC: 129 MG/DL (ref 70–126)
DEPRECATED RDW RBC AUTO: 38.8 FL (ref 35–45)
ERYTHROCYTE [DISTWIDTH] IN BLOOD BY AUTOMATED COUNT: 11.9 % (ref 11.5–14.5)
GFR SERPL CREATININE-BSD FRML MDRD: > 60 ML/MIN/1.73M2
GLUCOSE SERPL-MCNC: 96 MG/DL (ref 70–108)
GLUCOSE UR QL STRIP.AUTO: NEGATIVE MG/DL
HBA1C MFR BLD HPLC: 6.3 % (ref 4.4–6.4)
HCT VFR BLD AUTO: 45 % (ref 37–47)
HGB BLD-MCNC: 15.1 GM/DL (ref 12–16)
HGB UR QL STRIP.AUTO: NEGATIVE
INR PPP: 0.89 (ref 0.85–1.13)
KETONES UR QL STRIP.AUTO: ABNORMAL
LEUKOCYTE ESTERASE UR QL STRIP.AUTO: NEGATIVE
MCH RBC QN AUTO: 29.8 PG (ref 26–33)
MCHC RBC AUTO-ENTMCNC: 33.6 GM/DL (ref 32.2–35.5)
MCV RBC AUTO: 88.9 FL (ref 81–99)
MRSA DNA SPEC QL NAA+PROBE: NEGATIVE
NITRITE UR QL STRIP.AUTO: NEGATIVE
PH UR STRIP.AUTO: 5.5 [PH] (ref 5–9)
PLATELET # BLD AUTO: 251 THOU/MM3 (ref 130–400)
PMV BLD AUTO: 9.8 FL (ref 9.4–12.4)
POTASSIUM SERPL-SCNC: 4.4 MEQ/L (ref 3.5–5.2)
PROT UR STRIP.AUTO-MCNC: NEGATIVE MG/DL
RBC # BLD AUTO: 5.06 MILL/MM3 (ref 4.2–5.4)
RH FACTOR: NORMAL
S AUREUS DNA SPEC QL NAA+PROBE: POSITIVE
SODIUM SERPL-SCNC: 142 MEQ/L (ref 135–145)
SP GR UR REFRACT.AUTO: 1.02 (ref 1–1.03)
UROBILINOGEN UR QL STRIP.AUTO: 1 EU/DL (ref 0–1)
WBC # BLD AUTO: 7.1 THOU/MM3 (ref 4.8–10.8)

## 2023-08-14 PROCEDURE — 85027 COMPLETE CBC AUTOMATED: CPT

## 2023-08-14 PROCEDURE — 86850 RBC ANTIBODY SCREEN: CPT

## 2023-08-14 PROCEDURE — 93010 ELECTROCARDIOGRAM REPORT: CPT | Performed by: INTERNAL MEDICINE

## 2023-08-14 PROCEDURE — 86900 BLOOD TYPING SEROLOGIC ABO: CPT

## 2023-08-14 PROCEDURE — 71046 X-RAY EXAM CHEST 2 VIEWS: CPT

## 2023-08-14 PROCEDURE — 86923 COMPATIBILITY TEST ELECTRIC: CPT

## 2023-08-14 PROCEDURE — 83036 HEMOGLOBIN GLYCOSYLATED A1C: CPT

## 2023-08-14 PROCEDURE — 87641 MR-STAPH DNA AMP PROBE: CPT

## 2023-08-14 PROCEDURE — 86901 BLOOD TYPING SEROLOGIC RH(D): CPT

## 2023-08-14 PROCEDURE — 81003 URINALYSIS AUTO W/O SCOPE: CPT

## 2023-08-14 PROCEDURE — 87640 STAPH A DNA AMP PROBE: CPT

## 2023-08-14 PROCEDURE — 80048 BASIC METABOLIC PNL TOTAL CA: CPT

## 2023-08-14 PROCEDURE — 85610 PROTHROMBIN TIME: CPT

## 2023-08-14 PROCEDURE — 93005 ELECTROCARDIOGRAM TRACING: CPT

## 2023-08-14 PROCEDURE — 36415 COLL VENOUS BLD VENIPUNCTURE: CPT

## 2023-08-14 NOTE — FLOWSHEET NOTE
Pt is having no pain   It is 0/10 today. Pain scale and pain management review with patient. Preliminary Discharge Planning Questionnaire  Date of Surgery 9-1-23   Surgeon malka      Having the proper help and care after surgery is very important to your recovery. Who will be able to help you at home when you are discharged from the hospital?    significant other    Name(s) purvi    How many steps to enter your home? 0    Bathroom on first floor? Yes    Bedroom on the first floor? Yes    Do you have an elevated toilet seat to use at home? No    Do you have a walker to use at home? Total Joints - with wheels N/A   Spine - with wheels  N/A     Have you been doing home exercises? *You will go home with some outpatient physical therapy, where do you prefer to go? [facility]    *If needed, what home health agency would you like to use? Preference?   [facility]    *Cardiac Rehab plans [facility]

## 2023-08-14 NOTE — PROGRESS NOTES
Denies chest pain and shortness of breath     Pt asked for me to sign job and family services paper. Pt was here for visit with me from 8 to 1000 am on 8-14-23. This was for CABG preop visit.

## 2023-08-15 ENCOUNTER — TELEPHONE (OUTPATIENT)
Dept: CARDIOTHORACIC SURGERY | Age: 53
End: 2023-08-15

## 2023-08-15 LAB
EKG ATRIAL RATE: 66 BPM
EKG P AXIS: 37 DEGREES
EKG P-R INTERVAL: 150 MS
EKG Q-T INTERVAL: 432 MS
EKG QRS DURATION: 84 MS
EKG QTC CALCULATION (BAZETT): 452 MS
EKG R AXIS: 21 DEGREES
EKG T AXIS: 92 DEGREES
EKG VENTRICULAR RATE: 66 BPM

## 2023-08-15 NOTE — PROGRESS NOTES
Dr. Marly Clay office notified of positive Nasal Swab for MSSA all other pre op testing within normal limits.

## 2023-08-15 NOTE — TELEPHONE ENCOUNTER
Patient was positive for MRSA in nares. Patient states she fills prescriptions at [facility] on [facility]. Please advise, thank you.

## 2023-08-15 NOTE — TELEPHONE ENCOUNTER
Patient called office to inform us that she has an infection in her tooth, and is wondering if this is going to interfere with surgery scheduled for 8/18/2023. Patient scheduled for a CABG X2 W/ ALBERTINA. Please advise, thank you.

## 2023-08-15 NOTE — TELEPHONE ENCOUNTER
Per Dr. Murphy Meals, patient may proceed with procedure, unless they are uncomfortable with doing so. Patient states she would like to proceed with surgery scheduled for 08/18/2023.

## 2023-08-18 ENCOUNTER — APPOINTMENT (OUTPATIENT)
Dept: GENERAL RADIOLOGY | Age: 53
DRG: 166 | End: 2023-08-18
Attending: THORACIC SURGERY (CARDIOTHORACIC VASCULAR SURGERY)
Payer: MEDICAID

## 2023-08-18 ENCOUNTER — ANESTHESIA EVENT (OUTPATIENT)
Dept: OPERATING ROOM | Age: 53
End: 2023-08-18
Payer: MEDICAID

## 2023-08-18 ENCOUNTER — ANESTHESIA (OUTPATIENT)
Dept: OPERATING ROOM | Age: 53
End: 2023-08-18
Payer: MEDICAID

## 2023-08-18 ENCOUNTER — HOSPITAL ENCOUNTER (INPATIENT)
Age: 53
LOS: 10 days | Discharge: HOME HEALTH | DRG: 166 | End: 2023-08-28
Attending: THORACIC SURGERY (CARDIOTHORACIC VASCULAR SURGERY) | Admitting: THORACIC SURGERY (CARDIOTHORACIC VASCULAR SURGERY)
Payer: MEDICAID

## 2023-08-18 DIAGNOSIS — Z95.1 S/P CABG (CORONARY ARTERY BYPASS GRAFT): Primary | ICD-10-CM

## 2023-08-18 PROBLEM — I25.10 CAD, MULTIPLE VESSEL: Status: ACTIVE | Noted: 2023-08-18

## 2023-08-18 LAB
ACTIVATED CLOTTING TIME: 135 SECONDS (ref 99–130)
ACTIVATED CLOTTING TIME: 147 SECONDS (ref 99–130)
ACTIVATED CLOTTING TIME: 580 SECONDS (ref 99–130)
ACTIVATED CLOTTING TIME: 640 SECONDS (ref 99–130)
ACTIVATED CLOTTING TIME: 671 SECONDS (ref 99–130)
ACTIVATED CLOTTING TIME: 711 SECONDS (ref 99–130)
ACTIVATED CLOTTING TIME: 851 SECONDS (ref 99–130)
ANION GAP SERPL CALC-SCNC: 19 MEQ/L (ref 8–16)
ARTERIAL PATENCY WRIST A: ABNORMAL
BASE EXCESS BLDA CALC-SCNC: -0.3 MMOL/L (ref -2.5–2.5)
BASE EXCESS BLDA CALC-SCNC: -1.2 MMOL/L (ref -2.5–2.5)
BASE EXCESS BLDA CALC-SCNC: -3.1 MMOL/L (ref -2.5–2.5)
BASE EXCESS BLDA CALC-SCNC: -3.4 MMOL/L (ref -2.5–2.5)
BASE EXCESS BLDA CALC-SCNC: -5.2 MMOL/L (ref -2.5–2.5)
BASE EXCESS BLDA CALC-SCNC: -5.8 MMOL/L (ref -2.5–2.5)
BASE EXCESS BLDA CALC-SCNC: -9.1 MMOL/L (ref -2.5–2.5)
BASE EXCESS BLDA CALC-SCNC: 0.8 MMOL/L (ref -2.5–2.5)
BASE EXCESS BLDA CALC-SCNC: 2.9 MMOL/L (ref -2.5–2.5)
BASE EXCESS BLDA CALC-SCNC: 4.5 MMOL/L (ref -2–3)
BDY SITE: ABNORMAL
BDY SITE: NORMAL
BUN SERPL-MCNC: 11 MG/DL (ref 7–22)
CA-I BLD ISE-SCNC: 0.94 MMOL/L (ref 1.12–1.32)
CA-I BLD ISE-SCNC: 0.97 MMOL/L (ref 1.12–1.32)
CA-I BLD ISE-SCNC: 0.99 MMOL/L (ref 1.12–1.32)
CA-I BLD ISE-SCNC: 1.09 MMOL/L (ref 1.12–1.32)
CA-I BLD ISE-SCNC: 1.15 MMOL/L (ref 1.12–1.32)
CA-I BLD ISE-SCNC: 1.18 MMOL/L (ref 1.12–1.32)
CA-I BLD ISE-SCNC: 1.2 MMOL/L (ref 1.12–1.32)
CA-I BLD ISE-SCNC: 1.24 MMOL/L (ref 1.12–1.32)
CALCIUM SERPL-MCNC: 7 MG/DL (ref 8.5–10.5)
CARTRIDGE COLOR: NORMAL
CHLORIDE SERPL-SCNC: 109 MEQ/L (ref 98–111)
CO2 SERPL-SCNC: 19 MEQ/L (ref 23–33)
COLLECTED BY:: ABNORMAL
COLLECTED BY:: NORMAL
COMMENT: ABNORMAL
CREAT SERPL-MCNC: 0.8 MG/DL (ref 0.4–1.2)
DEPRECATED RDW RBC AUTO: 39.9 FL (ref 35–45)
DEVICE: ABNORMAL
DEVICE: NORMAL
ERYTHROCYTE [DISTWIDTH] IN BLOOD BY AUTOMATED COUNT: 12.1 % (ref 11.5–14.5)
FIO2 ON VENT O2 ANALYZER: 35 %
FIO2 ON VENT O2 ANALYZER: 50 %
FIO2 ON VENT O2 ANALYZER: 80 %
FIO2 ON VENT O2 ANALYZER: 80 %
GFR SERPL CREATININE-BSD FRML MDRD: > 60 ML/MIN/1.73M2
GLUCOSE BLD STRIP.AUTO-MCNC: 159 MG/DL (ref 70–108)
GLUCOSE BLD STRIP.AUTO-MCNC: 183 MG/DL (ref 70–108)
GLUCOSE BLD STRIP.AUTO-MCNC: 247 MG/DL (ref 70–108)
GLUCOSE BLD STRIP.AUTO-MCNC: 252 MG/DL (ref 70–108)
GLUCOSE BLD-MCNC: 109 MG/DL (ref 70–108)
GLUCOSE BLD-MCNC: 141 MG/DL (ref 70–108)
GLUCOSE BLD-MCNC: 149 MG/DL (ref 70–108)
GLUCOSE BLD-MCNC: 165 MG/DL (ref 70–108)
GLUCOSE BLD-MCNC: 175 MG/DL (ref 70–108)
GLUCOSE BLD-MCNC: 216 MG/DL (ref 70–108)
GLUCOSE BLD-MCNC: 229 MG/DL (ref 70–108)
GLUCOSE BLD-MCNC: 232 MG/DL (ref 70–108)
GLUCOSE BLD-MCNC: 238 MG/DL (ref 70–108)
GLUCOSE BLD-MCNC: 246 MG/DL (ref 70–108)
GLUCOSE SERPL-MCNC: 232 MG/DL (ref 70–108)
HCO3 BLDA-SCNC: 19 MMOL/L (ref 23–28)
HCO3 BLDA-SCNC: 20 MMOL/L (ref 23–28)
HCO3 BLDA-SCNC: 21 MMOL/L (ref 23–28)
HCO3 BLDA-SCNC: 22 MMOL/L (ref 23–28)
HCO3 BLDA-SCNC: 22 MMOL/L (ref 23–28)
HCO3 BLDA-SCNC: 24 MMOL/L (ref 23–28)
HCO3 BLDA-SCNC: 24 MMOL/L (ref 23–28)
HCO3 BLDA-SCNC: 26 MMOL/L (ref 23–28)
HCO3 BLDA-SCNC: 27 MMOL/L (ref 23–28)
HCO3 BLDA-SCNC: 30 MMOL/L (ref 23–28)
HCT VFR BLD AUTO: 32.5 % (ref 37–47)
HCT VFR BLD AUTO: 41.3 % (ref 37–47)
HEMOGLOBIN FINGERSTICK, POC: 14 G/DL (ref 12–16)
HEMOGLOBIN FINGERSTICK, POC: 8 G/DL (ref 12–16)
HEMOGLOBIN FINGERSTICK, POC: 8.6 G/DL (ref 12–16)
HEMOGLOBIN FINGERSTICK, POC: 9.8 G/DL (ref 12–16)
HGB BLD-MCNC: 10.9 GM/DL (ref 12–16)
HGB BLD-MCNC: 14.1 GM/DL (ref 12–16)
INR PPP: 1.12 (ref 0.85–1.13)
MAGNESIUM SERPL-MCNC: 3.6 MG/DL (ref 1.6–2.4)
MCH RBC QN AUTO: 30.5 PG (ref 26–33)
MCHC RBC AUTO-ENTMCNC: 33.5 GM/DL (ref 32.2–35.5)
MCV RBC AUTO: 91 FL (ref 81–99)
PATHOLOGIST REVIEW: ABNORMAL
PATIENT BOLUS: NORMAL
PATIENT HEPARIN CONCENTRATION: 0
PATIENT HEPARIN CONCENTRATION: 2
PATIENT HEPARIN CONCENTRATION: 4
PCO2 BLDA: 34 MMHG (ref 35–45)
PCO2 BLDA: 37 MMHG (ref 35–45)
PCO2 BLDA: 38 MMHG (ref 35–45)
PCO2 BLDA: 38 MMHG (ref 35–45)
PCO2 BLDA: 40 MMHG (ref 35–45)
PCO2 BLDA: 43 MMHG (ref 35–45)
PCO2 BLDA: 43 MMHG (ref 35–45)
PCO2 BLDA: 50 MMHG (ref 35–45)
PCO2 BLDA: 53 MMHG (ref 35–45)
PCO2 TEMP ADJ BLDMV: 47 MMHG (ref 41–51)
PEEP SETTING VENT: 6 MMHG
PH BLDA: 7.17 [PH] (ref 7.35–7.45)
PH BLDA: 7.25 [PH] (ref 7.35–7.45)
PH BLDA: 7.33 [PH] (ref 7.35–7.45)
PH BLDA: 7.36 [PH] (ref 7.35–7.45)
PH BLDA: 7.37 [PH] (ref 7.35–7.45)
PH BLDA: 7.39 [PH] (ref 7.35–7.45)
PH BLDA: 7.4 [PH] (ref 7.35–7.45)
PH BLDA: 7.41 [PH] (ref 7.35–7.45)
PH BLDA: 7.44 [PH] (ref 7.35–7.45)
PH BLDMV: 7.41 [PH] (ref 7.31–7.41)
PIP: 22 CMH2O
PIP: 26 CMH2O
PLATELET # BLD AUTO: 212 THOU/MM3 (ref 130–400)
PLATELET # BLD AUTO: 243 THOU/MM3 (ref 130–400)
PMV BLD AUTO: 9.7 FL (ref 9.4–12.4)
PO2 BLDA: 100 MMHG (ref 71–104)
PO2 BLDA: 111 MMHG (ref 71–104)
PO2 BLDA: 277 MMHG (ref 71–104)
PO2 BLDA: 336 MMHG (ref 71–104)
PO2 BLDA: 344 MMHG (ref 71–104)
PO2 BLDA: 70 MMHG (ref 71–104)
PO2 BLDA: 79 MMHG (ref 71–104)
PO2 BLDA: 88 MMHG (ref 71–104)
PO2 BLDA: 90 MMHG (ref 71–104)
PO2 BLDMV: 47 MMHG (ref 25–40)
POTASSIUM BLD-SCNC: 3.2 MEQ/L (ref 3.5–4.9)
POTASSIUM BLD-SCNC: 3.6 MEQ/L (ref 3.5–4.9)
POTASSIUM BLD-SCNC: 3.9 MEQ/L (ref 3.5–4.9)
POTASSIUM BLD-SCNC: 4.2 MEQ/L (ref 3.5–4.9)
POTASSIUM BLD-SCNC: 4.5 MEQ/L (ref 3.5–4.9)
POTASSIUM BLD-SCNC: 4.6 MEQ/L (ref 3.5–4.9)
POTASSIUM SERPL-SCNC: 3 MEQ/L (ref 3.5–5.2)
POTASSIUM SERPL-SCNC: 3.1 MEQ/L (ref 3.5–5.2)
POTASSIUM SERPL-SCNC: 3.6 MEQ/L (ref 3.5–5.2)
PRESSURE SUPPORT SETTING VENT: 10 CMH2O
PRESSURE SUPPORT SETTING VENT: 10 CMH2O
PROJECTED HEPARIN CONCENTATION: 2
RANGE: NORMAL
RBC # BLD AUTO: 3.57 MILL/MM3 (ref 4.2–5.4)
REASON FOR REJECTION: NORMAL
REASON FOR REJECTION: NORMAL
REJECTED TEST: NORMAL
REJECTED TEST: NORMAL
SAO2 % BLDA: 100 %
SAO2 % BLDA: 94 %
SAO2 % BLDA: 94 %
SAO2 % BLDA: 95 %
SAO2 % BLDA: 97 %
SAO2 % BLDA: 98 %
SAO2 % BLDA: 98 %
SAO2 % BLDMV: 83 %
SCAN OF BLOOD SMEAR: NORMAL
SODIUM BLD-SCNC: 142 MEQ/L (ref 138–146)
SODIUM BLD-SCNC: 143 MEQ/L (ref 138–146)
SODIUM BLD-SCNC: 147 MEQ/L (ref 138–146)
SODIUM SERPL-SCNC: 147 MEQ/L (ref 135–145)
VENTILATION MODE VENT: ABNORMAL
WBC # BLD AUTO: 39.1 THOU/MM3 (ref 4.8–10.8)

## 2023-08-18 PROCEDURE — B24BZZ4 ULTRASONOGRAPHY OF HEART WITH AORTA, TRANSESOPHAGEAL: ICD-10-PCS | Performed by: THORACIC SURGERY (CARDIOTHORACIC VASCULAR SURGERY)

## 2023-08-18 PROCEDURE — 87086 URINE CULTURE/COLONY COUNT: CPT

## 2023-08-18 PROCEDURE — 2580000003 HC RX 258: Performed by: NURSE ANESTHETIST, CERTIFIED REGISTERED

## 2023-08-18 PROCEDURE — 33510 CABG VEIN SINGLE: CPT | Performed by: THORACIC SURGERY (CARDIOTHORACIC VASCULAR SURGERY)

## 2023-08-18 PROCEDURE — 94002 VENT MGMT INPAT INIT DAY: CPT

## 2023-08-18 PROCEDURE — 6360000002 HC RX W HCPCS: Performed by: THORACIC SURGERY (CARDIOTHORACIC VASCULAR SURGERY)

## 2023-08-18 PROCEDURE — 85027 COMPLETE CBC AUTOMATED: CPT

## 2023-08-18 PROCEDURE — 85610 PROTHROMBIN TIME: CPT

## 2023-08-18 PROCEDURE — 93010 ELECTROCARDIOGRAM REPORT: CPT | Performed by: INTERNAL MEDICINE

## 2023-08-18 PROCEDURE — 82947 ASSAY GLUCOSE BLOOD QUANT: CPT

## 2023-08-18 PROCEDURE — 3700000001 HC ADD 15 MINUTES (ANESTHESIA): Performed by: THORACIC SURGERY (CARDIOTHORACIC VASCULAR SURGERY)

## 2023-08-18 PROCEDURE — 82948 REAGENT STRIP/BLOOD GLUCOSE: CPT

## 2023-08-18 PROCEDURE — 33510 CABG VEIN SINGLE: CPT | Performed by: PHYSICIAN ASSISTANT

## 2023-08-18 PROCEDURE — 33508 ENDOSCOPIC VEIN HARVEST: CPT | Performed by: PHYSICIAN ASSISTANT

## 2023-08-18 PROCEDURE — 3700000000 HC ANESTHESIA ATTENDED CARE: Performed by: THORACIC SURGERY (CARDIOTHORACIC VASCULAR SURGERY)

## 2023-08-18 PROCEDURE — 3600000018 HC SURGERY OHS ADDTL 15MIN: Performed by: THORACIC SURGERY (CARDIOTHORACIC VASCULAR SURGERY)

## 2023-08-18 PROCEDURE — 2580000003 HC RX 258: Performed by: THORACIC SURGERY (CARDIOTHORACIC VASCULAR SURGERY)

## 2023-08-18 PROCEDURE — 94761 N-INVAS EAR/PLS OXIMETRY MLT: CPT

## 2023-08-18 PROCEDURE — 37799 UNLISTED PX VASCULAR SURGERY: CPT

## 2023-08-18 PROCEDURE — 6360000002 HC RX W HCPCS: Performed by: NURSE ANESTHETIST, CERTIFIED REGISTERED

## 2023-08-18 PROCEDURE — 84132 ASSAY OF SERUM POTASSIUM: CPT

## 2023-08-18 PROCEDURE — C1729 CATH, DRAINAGE: HCPCS | Performed by: THORACIC SURGERY (CARDIOTHORACIC VASCULAR SURGERY)

## 2023-08-18 PROCEDURE — 2500000003 HC RX 250 WO HCPCS: Performed by: NURSE ANESTHETIST, CERTIFIED REGISTERED

## 2023-08-18 PROCEDURE — 6360000002 HC RX W HCPCS

## 2023-08-18 PROCEDURE — 93005 ELECTROCARDIOGRAM TRACING: CPT | Performed by: THORACIC SURGERY (CARDIOTHORACIC VASCULAR SURGERY)

## 2023-08-18 PROCEDURE — 2700000000 HC OXYGEN THERAPY PER DAY

## 2023-08-18 PROCEDURE — P9045 ALBUMIN (HUMAN), 5%, 250 ML: HCPCS | Performed by: NURSE ANESTHETIST, CERTIFIED REGISTERED

## 2023-08-18 PROCEDURE — 06BP4ZZ EXCISION OF RIGHT SAPHENOUS VEIN, PERCUTANEOUS ENDOSCOPIC APPROACH: ICD-10-PCS | Performed by: THORACIC SURGERY (CARDIOTHORACIC VASCULAR SURGERY)

## 2023-08-18 PROCEDURE — P9045 ALBUMIN (HUMAN), 5%, 250 ML: HCPCS

## 2023-08-18 PROCEDURE — 6370000000 HC RX 637 (ALT 250 FOR IP): Performed by: THORACIC SURGERY (CARDIOTHORACIC VASCULAR SURGERY)

## 2023-08-18 PROCEDURE — 2580000003 HC RX 258: Performed by: PHYSICIAN ASSISTANT

## 2023-08-18 PROCEDURE — 3600000008 HC SURGERY OHS BASE: Performed by: THORACIC SURGERY (CARDIOTHORACIC VASCULAR SURGERY)

## 2023-08-18 PROCEDURE — C1751 CATH, INF, PER/CENT/MIDLINE: HCPCS | Performed by: THORACIC SURGERY (CARDIOTHORACIC VASCULAR SURGERY)

## 2023-08-18 PROCEDURE — 05HM33Z INSERTION OF INFUSION DEVICE INTO RIGHT INTERNAL JUGULAR VEIN, PERCUTANEOUS APPROACH: ICD-10-PCS | Performed by: ANESTHESIOLOGY

## 2023-08-18 PROCEDURE — 85520 HEPARIN ASSAY: CPT

## 2023-08-18 PROCEDURE — 82330 ASSAY OF CALCIUM: CPT

## 2023-08-18 PROCEDURE — 83735 ASSAY OF MAGNESIUM: CPT

## 2023-08-18 PROCEDURE — 5A1221Z PERFORMANCE OF CARDIAC OUTPUT, CONTINUOUS: ICD-10-PCS | Performed by: THORACIC SURGERY (CARDIOTHORACIC VASCULAR SURGERY)

## 2023-08-18 PROCEDURE — B2101ZZ FLUOROSCOPY OF SINGLE CORONARY ARTERY USING LOW OSMOLAR CONTRAST: ICD-10-PCS | Performed by: THORACIC SURGERY (CARDIOTHORACIC VASCULAR SURGERY)

## 2023-08-18 PROCEDURE — 71045 X-RAY EXAM CHEST 1 VIEW: CPT

## 2023-08-18 PROCEDURE — P9041 ALBUMIN (HUMAN),5%, 50ML: HCPCS

## 2023-08-18 PROCEDURE — 0210093 BYPASS CORONARY ARTERY, ONE ARTERY FROM CORONARY ARTERY WITH AUTOLOGOUS VENOUS TISSUE, OPEN APPROACH: ICD-10-PCS | Performed by: THORACIC SURGERY (CARDIOTHORACIC VASCULAR SURGERY)

## 2023-08-18 PROCEDURE — 33508 ENDOSCOPIC VEIN HARVEST: CPT | Performed by: THORACIC SURGERY (CARDIOTHORACIC VASCULAR SURGERY)

## 2023-08-18 PROCEDURE — 2709999900 HC NON-CHARGEABLE SUPPLY: Performed by: THORACIC SURGERY (CARDIOTHORACIC VASCULAR SURGERY)

## 2023-08-18 PROCEDURE — 87070 CULTURE OTHR SPECIMN AEROBIC: CPT

## 2023-08-18 PROCEDURE — 2500000003 HC RX 250 WO HCPCS: Performed by: THORACIC SURGERY (CARDIOTHORACIC VASCULAR SURGERY)

## 2023-08-18 PROCEDURE — 2500000003 HC RX 250 WO HCPCS

## 2023-08-18 PROCEDURE — 85018 HEMOGLOBIN: CPT

## 2023-08-18 PROCEDURE — 82803 BLOOD GASES ANY COMBINATION: CPT

## 2023-08-18 PROCEDURE — 36415 COLL VENOUS BLD VENIPUNCTURE: CPT

## 2023-08-18 PROCEDURE — 2000000000 HC ICU R&B

## 2023-08-18 PROCEDURE — 0210099 BYPASS CORONARY ARTERY, ONE ARTERY FROM LEFT INTERNAL MAMMARY WITH AUTOLOGOUS VENOUS TISSUE, OPEN APPROACH: ICD-10-PCS | Performed by: THORACIC SURGERY (CARDIOTHORACIC VASCULAR SURGERY)

## 2023-08-18 PROCEDURE — 6370000000 HC RX 637 (ALT 250 FOR IP): Performed by: NURSE ANESTHETIST, CERTIFIED REGISTERED

## 2023-08-18 PROCEDURE — 2720000010 HC SURG SUPPLY STERILE: Performed by: THORACIC SURGERY (CARDIOTHORACIC VASCULAR SURGERY)

## 2023-08-18 PROCEDURE — P9041 ALBUMIN (HUMAN),5%, 50ML: HCPCS | Performed by: THORACIC SURGERY (CARDIOTHORACIC VASCULAR SURGERY)

## 2023-08-18 PROCEDURE — 6360000002 HC RX W HCPCS: Performed by: PHYSICIAN ASSISTANT

## 2023-08-18 PROCEDURE — 80048 BASIC METABOLIC PNL TOTAL CA: CPT

## 2023-08-18 PROCEDURE — P9047 ALBUMIN (HUMAN), 25%, 50ML: HCPCS

## 2023-08-18 PROCEDURE — 84295 ASSAY OF SERUM SODIUM: CPT

## 2023-08-18 PROCEDURE — 2580000003 HC RX 258

## 2023-08-18 RX ORDER — AMIODARONE HYDROCHLORIDE 200 MG/1
200 TABLET ORAL 2 TIMES DAILY
Status: DISCONTINUED | OUTPATIENT
Start: 2023-08-18 | End: 2023-08-22

## 2023-08-18 RX ORDER — OXYCODONE HYDROCHLORIDE 5 MG/1
5 TABLET ORAL EVERY 4 HOURS PRN
Status: DISCONTINUED | OUTPATIENT
Start: 2023-08-18 | End: 2023-08-28 | Stop reason: HOSPADM

## 2023-08-18 RX ORDER — SENNOSIDES 8.8 MG/5ML
10 LIQUID ORAL DAILY PRN
Status: DISCONTINUED | OUTPATIENT
Start: 2023-08-18 | End: 2023-08-28 | Stop reason: HOSPADM

## 2023-08-18 RX ORDER — ALBUTEROL SULFATE 90 UG/1
2 AEROSOL, METERED RESPIRATORY (INHALATION) EVERY 6 HOURS PRN
Status: DISCONTINUED | OUTPATIENT
Start: 2023-08-18 | End: 2023-08-28 | Stop reason: HOSPADM

## 2023-08-18 RX ORDER — SODIUM CHLORIDE 0.9 % (FLUSH) 0.9 %
5-40 SYRINGE (ML) INJECTION EVERY 12 HOURS SCHEDULED
Status: DISCONTINUED | OUTPATIENT
Start: 2023-08-18 | End: 2023-08-28 | Stop reason: HOSPADM

## 2023-08-18 RX ORDER — SODIUM CHLORIDE 9 MG/ML
INJECTION, SOLUTION INTRAVENOUS CONTINUOUS
Status: DISCONTINUED | OUTPATIENT
Start: 2023-08-18 | End: 2023-08-18

## 2023-08-18 RX ORDER — ENOXAPARIN SODIUM 100 MG/ML
30 INJECTION SUBCUTANEOUS 2 TIMES DAILY
Status: DISCONTINUED | OUTPATIENT
Start: 2023-08-19 | End: 2023-08-25

## 2023-08-18 RX ORDER — DEXTROSE MONOHYDRATE 100 MG/ML
INJECTION, SOLUTION INTRAVENOUS CONTINUOUS PRN
Status: DISCONTINUED | OUTPATIENT
Start: 2023-08-18 | End: 2023-08-25

## 2023-08-18 RX ORDER — ALBUMIN, HUMAN INJ 5% 5 %
25 SOLUTION INTRAVENOUS PRN
Status: DISCONTINUED | OUTPATIENT
Start: 2023-08-18 | End: 2023-08-25

## 2023-08-18 RX ORDER — SODIUM CHLORIDE, SODIUM LACTATE, POTASSIUM CHLORIDE, CALCIUM CHLORIDE 600; 310; 30; 20 MG/100ML; MG/100ML; MG/100ML; MG/100ML
INJECTION, SOLUTION INTRAVENOUS CONTINUOUS PRN
Status: DISCONTINUED | OUTPATIENT
Start: 2023-08-18 | End: 2023-08-18 | Stop reason: SDUPTHER

## 2023-08-18 RX ORDER — ENALAPRILAT 1.25 MG/ML
0.62 INJECTION INTRAVENOUS
Status: DISCONTINUED | OUTPATIENT
Start: 2023-08-18 | End: 2023-08-25

## 2023-08-18 RX ORDER — INSULIN GLARGINE 100 [IU]/ML
0.15 INJECTION, SOLUTION SUBCUTANEOUS NIGHTLY
Status: DISCONTINUED | OUTPATIENT
Start: 2023-08-19 | End: 2023-08-18 | Stop reason: ALTCHOICE

## 2023-08-18 RX ORDER — ACETAMINOPHEN 325 MG/1
650 TABLET ORAL EVERY 4 HOURS PRN
Status: DISCONTINUED | OUTPATIENT
Start: 2023-08-18 | End: 2023-08-28 | Stop reason: HOSPADM

## 2023-08-18 RX ORDER — SODIUM CHLORIDE 0.9 % (FLUSH) 0.9 %
5-40 SYRINGE (ML) INJECTION PRN
Status: DISCONTINUED | OUTPATIENT
Start: 2023-08-18 | End: 2023-08-28 | Stop reason: HOSPADM

## 2023-08-18 RX ORDER — SODIUM CHLORIDE, SODIUM GLUCONATE, SODIUM ACETATE, POTASSIUM CHLORIDE AND MAGNESIUM CHLORIDE 526; 502; 368; 37; 30 MG/100ML; MG/100ML; MG/100ML; MG/100ML; MG/100ML
INJECTION, SOLUTION INTRAVENOUS CONTINUOUS PRN
Status: DISCONTINUED | OUTPATIENT
Start: 2023-08-18 | End: 2023-08-18 | Stop reason: SDUPTHER

## 2023-08-18 RX ORDER — AMINOCAPROIC ACID 250 MG/ML
INJECTION, SOLUTION INTRAVENOUS PRN
Status: DISCONTINUED | OUTPATIENT
Start: 2023-08-18 | End: 2023-08-18 | Stop reason: SDUPTHER

## 2023-08-18 RX ORDER — ATORVASTATIN CALCIUM 20 MG/1
20 TABLET, FILM COATED ORAL NIGHTLY
Status: DISCONTINUED | OUTPATIENT
Start: 2023-08-19 | End: 2023-08-25

## 2023-08-18 RX ORDER — MAGNESIUM SULFATE IN WATER 40 MG/ML
2000 INJECTION, SOLUTION INTRAVENOUS PRN
Status: DISCONTINUED | OUTPATIENT
Start: 2023-08-18 | End: 2023-08-25

## 2023-08-18 RX ORDER — LIDOCAINE HCL/PF 100 MG/5ML
SYRINGE (ML) INJECTION PRN
Status: DISCONTINUED | OUTPATIENT
Start: 2023-08-18 | End: 2023-08-18 | Stop reason: SDUPTHER

## 2023-08-18 RX ORDER — SODIUM CHLORIDE 0.9 % (FLUSH) 0.9 %
5-40 SYRINGE (ML) INJECTION EVERY 12 HOURS SCHEDULED
Status: DISCONTINUED | OUTPATIENT
Start: 2023-08-18 | End: 2023-08-18 | Stop reason: HOSPADM

## 2023-08-18 RX ORDER — SODIUM CHLORIDE 9 MG/ML
INJECTION, SOLUTION INTRAVENOUS CONTINUOUS
Status: DISCONTINUED | OUTPATIENT
Start: 2023-08-18 | End: 2023-08-25

## 2023-08-18 RX ORDER — ROCURONIUM BROMIDE 10 MG/ML
INJECTION, SOLUTION INTRAVENOUS PRN
Status: DISCONTINUED | OUTPATIENT
Start: 2023-08-18 | End: 2023-08-18 | Stop reason: SDUPTHER

## 2023-08-18 RX ORDER — MULTIVITAMIN WITH IRON
1 TABLET ORAL
Status: DISCONTINUED | OUTPATIENT
Start: 2023-08-19 | End: 2023-08-28 | Stop reason: HOSPADM

## 2023-08-18 RX ORDER — ONDANSETRON 2 MG/ML
4 INJECTION INTRAMUSCULAR; INTRAVENOUS EVERY 6 HOURS PRN
Status: DISCONTINUED | OUTPATIENT
Start: 2023-08-18 | End: 2023-08-28 | Stop reason: HOSPADM

## 2023-08-18 RX ORDER — MIDAZOLAM HYDROCHLORIDE 1 MG/ML
INJECTION INTRAMUSCULAR; INTRAVENOUS PRN
Status: DISCONTINUED | OUTPATIENT
Start: 2023-08-18 | End: 2023-08-18 | Stop reason: SDUPTHER

## 2023-08-18 RX ORDER — FAMOTIDINE 20 MG/1
20 TABLET, FILM COATED ORAL 2 TIMES DAILY
Status: DISCONTINUED | OUTPATIENT
Start: 2023-08-18 | End: 2023-08-28 | Stop reason: HOSPADM

## 2023-08-18 RX ORDER — ONDANSETRON 4 MG/1
4 TABLET, ORALLY DISINTEGRATING ORAL EVERY 8 HOURS PRN
Status: DISCONTINUED | OUTPATIENT
Start: 2023-08-18 | End: 2023-08-28 | Stop reason: HOSPADM

## 2023-08-18 RX ORDER — PROPOFOL 10 MG/ML
INJECTION, EMULSION INTRAVENOUS PRN
Status: DISCONTINUED | OUTPATIENT
Start: 2023-08-18 | End: 2023-08-18 | Stop reason: SDUPTHER

## 2023-08-18 RX ORDER — ALBUMIN, HUMAN INJ 5% 5 %
SOLUTION INTRAVENOUS
Status: COMPLETED
Start: 2023-08-18 | End: 2023-08-18

## 2023-08-18 RX ORDER — SODIUM CHLORIDE 9 MG/ML
INJECTION, SOLUTION INTRAVENOUS PRN
Status: DISCONTINUED | OUTPATIENT
Start: 2023-08-18 | End: 2023-08-18 | Stop reason: HOSPADM

## 2023-08-18 RX ORDER — ALBUMIN, HUMAN INJ 5% 5 %
SOLUTION INTRAVENOUS PRN
Status: DISCONTINUED | OUTPATIENT
Start: 2023-08-18 | End: 2023-08-18 | Stop reason: SDUPTHER

## 2023-08-18 RX ORDER — PAPAVERINE HYDROCHLORIDE 30 MG/ML
INJECTION INTRAMUSCULAR; INTRAVENOUS PRN
Status: DISCONTINUED | OUTPATIENT
Start: 2023-08-18 | End: 2023-08-18 | Stop reason: ALTCHOICE

## 2023-08-18 RX ORDER — CITALOPRAM 20 MG/1
10 TABLET ORAL DAILY
Status: DISCONTINUED | OUTPATIENT
Start: 2023-08-19 | End: 2023-08-25

## 2023-08-18 RX ORDER — OXYCODONE HYDROCHLORIDE 5 MG/1
10 TABLET ORAL EVERY 4 HOURS PRN
Status: DISCONTINUED | OUTPATIENT
Start: 2023-08-18 | End: 2023-08-28 | Stop reason: HOSPADM

## 2023-08-18 RX ORDER — MORPHINE SULFATE 4 MG/ML
4 INJECTION, SOLUTION INTRAMUSCULAR; INTRAVENOUS
Status: DISCONTINUED | OUTPATIENT
Start: 2023-08-18 | End: 2023-08-25

## 2023-08-18 RX ORDER — SENNA AND DOCUSATE SODIUM 50; 8.6 MG/1; MG/1
1 TABLET, FILM COATED ORAL 2 TIMES DAILY
Status: DISCONTINUED | OUTPATIENT
Start: 2023-08-18 | End: 2023-08-28 | Stop reason: HOSPADM

## 2023-08-18 RX ORDER — MORPHINE SULFATE 2 MG/ML
2 INJECTION, SOLUTION INTRAMUSCULAR; INTRAVENOUS
Status: DISCONTINUED | OUTPATIENT
Start: 2023-08-18 | End: 2023-08-18

## 2023-08-18 RX ORDER — METOPROLOL TARTRATE 5 MG/5ML
2.5 INJECTION INTRAVENOUS EVERY 10 MIN PRN
Status: DISCONTINUED | OUTPATIENT
Start: 2023-08-18 | End: 2023-08-25

## 2023-08-18 RX ORDER — HEPARIN SODIUM 1000 [USP'U]/ML
INJECTION, SOLUTION INTRAVENOUS; SUBCUTANEOUS PRN
Status: DISCONTINUED | OUTPATIENT
Start: 2023-08-18 | End: 2023-08-18 | Stop reason: SDUPTHER

## 2023-08-18 RX ORDER — FENTANYL CITRATE 50 UG/ML
INJECTION, SOLUTION INTRAMUSCULAR; INTRAVENOUS PRN
Status: DISCONTINUED | OUTPATIENT
Start: 2023-08-18 | End: 2023-08-18 | Stop reason: SDUPTHER

## 2023-08-18 RX ORDER — SODIUM CHLORIDE 0.9 % (FLUSH) 0.9 %
5-40 SYRINGE (ML) INJECTION PRN
Status: DISCONTINUED | OUTPATIENT
Start: 2023-08-18 | End: 2023-08-18 | Stop reason: HOSPADM

## 2023-08-18 RX ORDER — SODIUM CHLORIDE 9 MG/ML
INJECTION, SOLUTION INTRAVENOUS PRN
Status: DISCONTINUED | OUTPATIENT
Start: 2023-08-18 | End: 2023-08-25

## 2023-08-18 RX ORDER — ASPIRIN 325 MG
325 TABLET, DELAYED RELEASE (ENTERIC COATED) ORAL DAILY
Status: DISCONTINUED | OUTPATIENT
Start: 2023-08-19 | End: 2023-08-28 | Stop reason: HOSPADM

## 2023-08-18 RX ORDER — AMIODARONE HYDROCHLORIDE 50 MG/ML
INJECTION, SOLUTION INTRAVENOUS PRN
Status: DISCONTINUED | OUTPATIENT
Start: 2023-08-18 | End: 2023-08-18 | Stop reason: SDUPTHER

## 2023-08-18 RX ORDER — POTASSIUM CHLORIDE 29.8 MG/ML
20 INJECTION INTRAVENOUS PRN
Status: DISCONTINUED | OUTPATIENT
Start: 2023-08-18 | End: 2023-08-25

## 2023-08-18 RX ORDER — PROTAMINE SULFATE 10 MG/ML
50 INJECTION, SOLUTION INTRAVENOUS
Status: DISPENSED | OUTPATIENT
Start: 2023-08-18 | End: 2023-08-19

## 2023-08-18 RX ORDER — PROTAMINE SULFATE 10 MG/ML
INJECTION, SOLUTION INTRAVENOUS PRN
Status: DISCONTINUED | OUTPATIENT
Start: 2023-08-18 | End: 2023-08-18 | Stop reason: SDUPTHER

## 2023-08-18 RX ADMIN — Medication 8 MCG/MIN: at 20:57

## 2023-08-18 RX ADMIN — SODIUM CHLORIDE, POTASSIUM CHLORIDE, SODIUM LACTATE AND CALCIUM CHLORIDE: 600; 310; 30; 20 INJECTION, SOLUTION INTRAVENOUS at 13:27

## 2023-08-18 RX ADMIN — SODIUM CHLORIDE: 9 INJECTION, SOLUTION INTRAVENOUS at 06:29

## 2023-08-18 RX ADMIN — ALBUMIN (HUMAN) 12.5 G: 12.5 INJECTION, SOLUTION INTRAVENOUS at 13:16

## 2023-08-18 RX ADMIN — ROCURONIUM BROMIDE 50 MG: 10 INJECTION INTRAVENOUS at 09:23

## 2023-08-18 RX ADMIN — HEPARIN SODIUM 5000 UNITS: 1000 INJECTION INTRAVENOUS; SUBCUTANEOUS at 08:35

## 2023-08-18 RX ADMIN — ALBUMIN (HUMAN) 12.5 G: 12.5 INJECTION, SOLUTION INTRAVENOUS at 12:11

## 2023-08-18 RX ADMIN — PROTAMINE SULFATE 150 MG: 10 INJECTION, SOLUTION INTRAVENOUS at 12:05

## 2023-08-18 RX ADMIN — AMINOCAPROIC ACID 5000 MG: 250 INJECTION, SOLUTION INTRAVENOUS at 12:20

## 2023-08-18 RX ADMIN — SODIUM BICARBONATE 50 MEQ: 84 INJECTION, SOLUTION INTRAVENOUS at 14:24

## 2023-08-18 RX ADMIN — PROPOFOL 50 MG: 10 INJECTION, EMULSION INTRAVENOUS at 07:36

## 2023-08-18 RX ADMIN — SODIUM CHLORIDE 3.44 UNITS/HR: 9 INJECTION, SOLUTION INTRAVENOUS at 15:47

## 2023-08-18 RX ADMIN — SODIUM BICARBONATE 50 MEQ: 84 INJECTION, SOLUTION INTRAVENOUS at 14:36

## 2023-08-18 RX ADMIN — MIDAZOLAM 2 MG: 1 INJECTION INTRAMUSCULAR; INTRAVENOUS at 07:31

## 2023-08-18 RX ADMIN — AMIODARONE HYDROCHLORIDE 200 MG: 200 TABLET ORAL at 20:35

## 2023-08-18 RX ADMIN — AMINOCAPROIC ACID 1 G/HR: 250 INJECTION, SOLUTION INTRAVENOUS at 12:52

## 2023-08-18 RX ADMIN — POTASSIUM CHLORIDE 20 MEQ: 29.8 INJECTION, SOLUTION INTRAVENOUS at 16:28

## 2023-08-18 RX ADMIN — MORPHINE SULFATE 2 MG: 2 INJECTION, SOLUTION INTRAMUSCULAR; INTRAVENOUS at 18:33

## 2023-08-18 RX ADMIN — MIDAZOLAM 1 MG: 1 INJECTION INTRAMUSCULAR; INTRAVENOUS at 07:35

## 2023-08-18 RX ADMIN — HEPARIN SODIUM 25000 UNITS: 1000 INJECTION INTRAVENOUS; SUBCUTANEOUS at 09:14

## 2023-08-18 RX ADMIN — ROCURONIUM BROMIDE 50 MG: 10 INJECTION INTRAVENOUS at 08:46

## 2023-08-18 RX ADMIN — SODIUM CHLORIDE, PRESERVATIVE FREE 10 ML: 5 INJECTION INTRAVENOUS at 21:06

## 2023-08-18 RX ADMIN — SENNOSIDES AND DOCUSATE SODIUM 1 TABLET: 50; 8.6 TABLET ORAL at 20:35

## 2023-08-18 RX ADMIN — ALBUMIN (HUMAN) 25 G: 12.5 INJECTION, SOLUTION INTRAVENOUS at 14:04

## 2023-08-18 RX ADMIN — AMINOCAPROIC ACID 5000 MG: 250 INJECTION, SOLUTION INTRAVENOUS at 08:48

## 2023-08-18 RX ADMIN — SODIUM CHLORIDE, POTASSIUM CHLORIDE, SODIUM LACTATE AND CALCIUM CHLORIDE: 600; 310; 30; 20 INJECTION, SOLUTION INTRAVENOUS at 08:00

## 2023-08-18 RX ADMIN — Medication 2000 MG: at 07:45

## 2023-08-18 RX ADMIN — MORPHINE SULFATE 2 MG: 2 INJECTION, SOLUTION INTRAMUSCULAR; INTRAVENOUS at 16:29

## 2023-08-18 RX ADMIN — POTASSIUM CHLORIDE 20 MEQ: 29.8 INJECTION, SOLUTION INTRAVENOUS at 20:16

## 2023-08-18 RX ADMIN — SODIUM CHLORIDE, SODIUM GLUCONATE, SODIUM ACETATE, POTASSIUM CHLORIDE AND MAGNESIUM CHLORIDE: 526; 502; 368; 37; 30 INJECTION, SOLUTION INTRAVENOUS at 07:50

## 2023-08-18 RX ADMIN — AMIODARONE HYDROCHLORIDE 150 MG: 50 INJECTION, SOLUTION INTRAVENOUS at 11:53

## 2023-08-18 RX ADMIN — Medication 10 MCG/MIN: at 13:45

## 2023-08-18 RX ADMIN — SODIUM BICARBONATE 50 MEQ: 84 INJECTION, SOLUTION INTRAVENOUS at 18:30

## 2023-08-18 RX ADMIN — EPINEPHRINE 5 MCG/MIN: 1 INJECTION INTRAMUSCULAR; INTRAVENOUS; SUBCUTANEOUS at 11:53

## 2023-08-18 RX ADMIN — Medication 100 MG: at 07:36

## 2023-08-18 RX ADMIN — POTASSIUM CHLORIDE 20 MEQ: 29.8 INJECTION, SOLUTION INTRAVENOUS at 22:39

## 2023-08-18 RX ADMIN — CALCIUM CHLORIDE 2000 MG: 100 INJECTION, SOLUTION INTRAVENOUS at 15:15

## 2023-08-18 RX ADMIN — POTASSIUM CHLORIDE 20 MEQ: 29.8 INJECTION, SOLUTION INTRAVENOUS at 18:41

## 2023-08-18 RX ADMIN — FENTANYL CITRATE 150 MCG: 50 INJECTION INTRAMUSCULAR; INTRAVENOUS at 08:02

## 2023-08-18 RX ADMIN — FENTANYL CITRATE 250 MCG: 50 INJECTION INTRAMUSCULAR; INTRAVENOUS at 08:46

## 2023-08-18 RX ADMIN — SODIUM BICARBONATE 50 MEQ: 84 INJECTION, SOLUTION INTRAVENOUS at 14:32

## 2023-08-18 RX ADMIN — FENTANYL CITRATE 100 MCG: 50 INJECTION INTRAMUSCULAR; INTRAVENOUS at 07:31

## 2023-08-18 RX ADMIN — POTASSIUM CHLORIDE 20 MEQ: 29.8 INJECTION, SOLUTION INTRAVENOUS at 15:05

## 2023-08-18 RX ADMIN — MIDAZOLAM 3 MG: 1 INJECTION INTRAMUSCULAR; INTRAVENOUS at 09:23

## 2023-08-18 RX ADMIN — SODIUM CHLORIDE: 9 INJECTION, SOLUTION INTRAVENOUS at 14:02

## 2023-08-18 RX ADMIN — VASOPRESSIN 0.04 UNITS/MIN: 20 INJECTION INTRAVENOUS at 21:00

## 2023-08-18 RX ADMIN — POTASSIUM CHLORIDE 20 MEQ: 29.8 INJECTION, SOLUTION INTRAVENOUS at 23:41

## 2023-08-18 RX ADMIN — MORPHINE SULFATE 4 MG: 4 INJECTION, SOLUTION INTRAMUSCULAR; INTRAVENOUS at 22:34

## 2023-08-18 RX ADMIN — ROCURONIUM BROMIDE 50 MG: 10 INJECTION INTRAVENOUS at 11:12

## 2023-08-18 RX ADMIN — SODIUM CHLORIDE, SODIUM LACTATE, POTASSIUM CHLORIDE, CALCIUM CHLORIDE: 600; 310; 30; 20 INJECTION, SOLUTION INTRAVENOUS at 08:20

## 2023-08-18 RX ADMIN — ALBUMIN (HUMAN) 25 G: 12.5 INJECTION, SOLUTION INTRAVENOUS at 14:52

## 2023-08-18 RX ADMIN — Medication 2000 MG: at 20:32

## 2023-08-18 RX ADMIN — ACETAMINOPHEN 650 MG: 325 TABLET ORAL at 20:35

## 2023-08-18 RX ADMIN — OXYCODONE HYDROCHLORIDE 10 MG: 5 TABLET ORAL at 20:35

## 2023-08-18 RX ADMIN — Medication 2 UNITS/HR: at 09:33

## 2023-08-18 RX ADMIN — FAMOTIDINE 20 MG: 20 TABLET, FILM COATED ORAL at 20:35

## 2023-08-18 RX ADMIN — Medication 2000 MG: at 11:38

## 2023-08-18 ASSESSMENT — PULMONARY FUNCTION TESTS
PIF_VALUE: 22
PIF_VALUE: 17

## 2023-08-18 ASSESSMENT — PAIN - FUNCTIONAL ASSESSMENT
PAIN_FUNCTIONAL_ASSESSMENT: PREVENTS OR INTERFERES WITH MANY ACTIVE NOT PASSIVE ACTIVITIES
PAIN_FUNCTIONAL_ASSESSMENT: 0-10

## 2023-08-18 ASSESSMENT — PAIN DESCRIPTION - ORIENTATION: ORIENTATION: MID

## 2023-08-18 ASSESSMENT — ENCOUNTER SYMPTOMS: SHORTNESS OF BREATH: 1

## 2023-08-18 ASSESSMENT — PAIN DESCRIPTION - LOCATION: LOCATION: CHEST;STERNUM

## 2023-08-18 ASSESSMENT — PAIN SCALES - GENERAL: PAINLEVEL_OUTOF10: 9

## 2023-08-18 NOTE — H&P
from Last 3 Encounters:   06/14/23 174 lb 12.8 oz (79.3 kg)   05/15/23 171 lb 3.2 oz (77.7 kg)   04/20/23 170 lb 9.6 oz (77.4 kg)          BP Readings from Last 3 Encounters:   06/14/23 128/70   05/15/23 112/68   04/20/23 113/68         Physical Exam  Constitutional:       Appearance: Normal appearance. HENT:      Head: Normocephalic and atraumatic. Right Ear: Ear canal and external ear normal.      Left Ear: Ear canal and external ear normal.      Nose: Nose normal.      Mouth/Throat:      Mouth: Mucous membranes are moist.      Pharynx: Oropharynx is clear. Eyes:      Extraocular Movements: Extraocular movements intact. Conjunctiva/sclera: Conjunctivae normal.      Pupils: Pupils are equal, round, and reactive to light. Cardiovascular:      Rate and Rhythm: Normal rate and regular rhythm. Pulses: Normal pulses. Heart sounds: Normal heart sounds. No murmur heard. Pulmonary:      Effort: No respiratory distress. Abdominal:      General: Abdomen is flat. There is no distension. Musculoskeletal:         General: No swelling. Normal range of motion. Cervical back: Normal range of motion and neck supple. No tenderness. Skin:     General: Skin is warm and dry. Capillary Refill: Capillary refill takes 2 to 3 seconds. Coloration: Skin is not jaundiced. Neurological:      General: No focal deficit present. Mental Status: She is alert and oriented to person, place, and time.    Psychiatric:         Mood and Affect: Mood normal.               Lab Results   Component Value Date/Time     WBC 9.0 04/05/2023 11:01 AM     RBC 4.99 04/05/2023 11:01 AM     HGB 14.9 04/05/2023 11:01 AM     HCT 44.6 04/05/2023 11:01 AM     MCV 89.4 04/05/2023 11:01 AM     MCH 29.9 04/05/2023 11:01 AM     MCHC 33.4 04/05/2023 11:01 AM     RDW 12.2 09/20/2022 04:22 PM      04/05/2023 11:01 AM     MPV 8.9 04/05/2023 11:01 AM               Lab Results   Component Value Date/Time

## 2023-08-18 NOTE — FLOWSHEET NOTE
Received pt from OR, bagged per anesthesia. Connected to monitor. Amicar @50ml/hr, insulin @ 4ml/hr, epi @ mcg/min  1350 [facility]iology in room for xray.

## 2023-08-18 NOTE — ANESTHESIA PROCEDURE NOTES
Arterial Line:    An arterial line was placed using surface landmarks, in the OR for the following indication(s): continuous blood pressure monitoring and blood sampling needed. A 20 gauge (size) (length), Arrow (type) catheter was placed, Seldinger technique used, into the left radial artery, secured by suture, tape and Tegaderm. Anesthesia type: General    Events:  patient tolerated procedure well with no complications.   Anesthesiologist: Jemal Delgado MD  Performed: Anesthesiologist   Preanesthetic Checklist  Completed: patient identified, IV checked, site marked, risks and benefits discussed, surgical/procedural consents, equipment checked, pre-op evaluation, timeout performed, anesthesia consent given, oxygen available, monitors applied/VS acknowledged, fire risk safety assessment completed and verbalized and blood product R/B/A discussed and consented
Procedure Performed: ALBERTINA       Start Time:        End Time:      Preanesthesia Checklist:  Patient identified, IV assessed, risks and benefits discussed, monitors and equipment assessed, procedure being performed at surgeon's request and anesthesia consent obtained. General Procedure Information  Diagnostic Indications for Echo:  assessment of surgical repair, defect repair evaluation, hemodynamic monitoring and assessment of valve function  Physician Requesting Echo: Marie Carvajal MD  CPT Code:  81841 [id number]  Location performed:  OR  Intubated  Bite block placed  Heart visualized  Probe Insertion:  Easy  Probe Type:  3D  Modalities:  3D, color flow mapping, continuous wave Doppler and pulse wave Doppler    Echocardiographic and Doppler Measurements    Ventricles    Right Ventricle:  Cavity size normal.    Left Ventricle:  Cavity size normal.  Global Function normal.  Ejection Fraction 55%. Ventricular Regional Function:  1- Basal Anteroseptal:  normal  2- Basal Anterior:  normal  3- Basal Anterolateral:  normal  4- Basal Inferolateral:  normal  5- Basal Inferior:  normal  6- Basal Inferoseptal:  normal  7- Mid Anteroseptal:  normal  8- Mid Anterior:  normal  9- Mid Anterolateral:  normal  10- Mid Inferolateral:  normal  11- Mid Inferior:  normal  12- Mid Inferoseptal:  normal  13- Apical Anterior:  normal  14- Apical Lateral:  normal  15- Apical Inferior:  normal  16- Apical Septal:  normal  17- [place]:  normal      Valves    Aortic Valve: Annulus normal.  Stenosis not present. Regurgitation none. Leaflets normal.  Leaflet motions normal.      Mitral Valve: Annulus normal.  Stenosis not present. Regurgitation mild. Leaflets normal.  Leaflet motions normal.      Tricuspid Valve: Annulus normal.  Stenosis not present. Regurgitation none. Pulmonic Valve: Annulus normal.  Stenosis not present. Regurgitation none.         Aorta    Ascending Aorta:  Size normal.    Aortic Arch:  Size normal.
acknowledged, fire risk safety assessment completed and verbalized and blood product R/B/A discussed and consented

## 2023-08-18 NOTE — OP NOTE
pericardial well was created. The ascending aorta was palpated and found to be free of calcifications. The proximal arch was cannulated with a 21 mm arterial cannula. A double stage venous cannula was inserted into the right atrium. A retrograde cardioplegia cannula was inserted via right atrial pursestring into the coronary sinus. An antegrade cardioplegia cannula was placed in the mid distal ascending aorta. Cardiopulmonary bypass was initiated. The coronary targets were inspected. The aorta was crossclamped. 1000 ml of cold blood cardioplegia was administered antegrade, followed by 600 ml retrograde. Thereafter, at 20 minute intervals throughout the crossclamped portion of the operation, cold blood cardioplegia was administered down the completed vein grafts, the coronary sinus, and/or the aortic root. An extensive search was conducted on the posterior wall for targets of graftable size. On initial dissection in the mid posterior wall, the first obtuse marginal artery was found to be miniscule and ungraftable. We proceeded farther posterolaterally. An apical suction device was applied, and an arteriotomy was made in the most distal obtuse marginal coronary artery. A segment of reversed greater saphenous vein was anastomosed in end to side fashion using running 7-0 Prolene. Patency was evaluated with the direct injection of fluorescein contrast into the vein graft under real-time coronary angiography, showing limited flow into the native coronary. This was confirmed with direct injection. Clearly, the runoff was inadequate to sustain long-term patency. The decision was made to ligate the vein graft just proximal to the anastomosis. The anterolateral wall was exposed. The left internal mammary artery was small but did exhibit pulsatility on the initial mobilization.  The artery was trimmed appropriately and anastomosed in end to side fashion to the mid proximal left anterior descending

## 2023-08-19 ENCOUNTER — APPOINTMENT (OUTPATIENT)
Dept: GENERAL RADIOLOGY | Age: 53
DRG: 166 | End: 2023-08-19
Attending: THORACIC SURGERY (CARDIOTHORACIC VASCULAR SURGERY)
Payer: MEDICAID

## 2023-08-19 LAB
ANION GAP SERPL CALC-SCNC: 9 MEQ/L (ref 8–16)
BUN SERPL-MCNC: 11 MG/DL (ref 7–22)
CA-I BLD ISE-SCNC: 1.17 MMOL/L (ref 1.12–1.32)
CALCIUM SERPL-MCNC: 9 MG/DL (ref 8.5–10.5)
CHLORIDE SERPL-SCNC: 116 MEQ/L (ref 98–111)
CO2 SERPL-SCNC: 24 MEQ/L (ref 23–33)
CREAT SERPL-MCNC: 0.8 MG/DL (ref 0.4–1.2)
DEPRECATED RDW RBC AUTO: 40.5 FL (ref 35–45)
ERYTHROCYTE [DISTWIDTH] IN BLOOD BY AUTOMATED COUNT: 12.7 % (ref 11.5–14.5)
GFR SERPL CREATININE-BSD FRML MDRD: > 60 ML/MIN/1.73M2
GLUCOSE BLD STRIP.AUTO-MCNC: 100 MG/DL (ref 70–108)
GLUCOSE BLD STRIP.AUTO-MCNC: 100 MG/DL (ref 70–108)
GLUCOSE BLD STRIP.AUTO-MCNC: 101 MG/DL (ref 70–108)
GLUCOSE BLD STRIP.AUTO-MCNC: 102 MG/DL (ref 70–108)
GLUCOSE BLD STRIP.AUTO-MCNC: 103 MG/DL (ref 70–108)
GLUCOSE BLD STRIP.AUTO-MCNC: 105 MG/DL (ref 70–108)
GLUCOSE BLD STRIP.AUTO-MCNC: 106 MG/DL (ref 70–108)
GLUCOSE BLD STRIP.AUTO-MCNC: 106 MG/DL (ref 70–108)
GLUCOSE BLD STRIP.AUTO-MCNC: 107 MG/DL (ref 70–108)
GLUCOSE BLD STRIP.AUTO-MCNC: 110 MG/DL (ref 70–108)
GLUCOSE BLD STRIP.AUTO-MCNC: 139 MG/DL (ref 70–108)
GLUCOSE BLD STRIP.AUTO-MCNC: 94 MG/DL (ref 70–108)
GLUCOSE BLD STRIP.AUTO-MCNC: 95 MG/DL (ref 70–108)
GLUCOSE BLD STRIP.AUTO-MCNC: 96 MG/DL (ref 70–108)
GLUCOSE BLD STRIP.AUTO-MCNC: 97 MG/DL (ref 70–108)
GLUCOSE BLD STRIP.AUTO-MCNC: 97 MG/DL (ref 70–108)
GLUCOSE BLD STRIP.AUTO-MCNC: 99 MG/DL (ref 70–108)
GLUCOSE SERPL-MCNC: 97 MG/DL (ref 70–108)
HCT VFR BLD AUTO: 26.8 % (ref 37–47)
HGB BLD-MCNC: 9 GM/DL (ref 12–16)
INR PPP: 1.18 (ref 0.85–1.13)
MAGNESIUM SERPL-MCNC: 2.2 MG/DL (ref 1.6–2.4)
MCH RBC QN AUTO: 29.5 PG (ref 26–33)
MCHC RBC AUTO-ENTMCNC: 33.6 GM/DL (ref 32.2–35.5)
MCV RBC AUTO: 87.9 FL (ref 81–99)
PLATELET # BLD AUTO: 169 THOU/MM3 (ref 130–400)
PMV BLD AUTO: 10 FL (ref 9.4–12.4)
POTASSIUM SERPL-SCNC: 4.3 MEQ/L (ref 3.5–5.2)
POTASSIUM SERPL-SCNC: 4.6 MEQ/L (ref 3.5–5.2)
RBC # BLD AUTO: 3.05 MILL/MM3 (ref 4.2–5.4)
SODIUM SERPL-SCNC: 149 MEQ/L (ref 135–145)
WBC # BLD AUTO: 17.3 THOU/MM3 (ref 4.8–10.8)

## 2023-08-19 PROCEDURE — 80048 BASIC METABOLIC PNL TOTAL CA: CPT

## 2023-08-19 PROCEDURE — 97110 THERAPEUTIC EXERCISES: CPT

## 2023-08-19 PROCEDURE — 2500000003 HC RX 250 WO HCPCS: Performed by: THORACIC SURGERY (CARDIOTHORACIC VASCULAR SURGERY)

## 2023-08-19 PROCEDURE — 6360000002 HC RX W HCPCS: Performed by: THORACIC SURGERY (CARDIOTHORACIC VASCULAR SURGERY)

## 2023-08-19 PROCEDURE — 2700000000 HC OXYGEN THERAPY PER DAY

## 2023-08-19 PROCEDURE — 36415 COLL VENOUS BLD VENIPUNCTURE: CPT

## 2023-08-19 PROCEDURE — 2580000003 HC RX 258: Performed by: THORACIC SURGERY (CARDIOTHORACIC VASCULAR SURGERY)

## 2023-08-19 PROCEDURE — 6360000002 HC RX W HCPCS

## 2023-08-19 PROCEDURE — 6370000000 HC RX 637 (ALT 250 FOR IP): Performed by: THORACIC SURGERY (CARDIOTHORACIC VASCULAR SURGERY)

## 2023-08-19 PROCEDURE — A4216 STERILE WATER/SALINE, 10 ML: HCPCS | Performed by: THORACIC SURGERY (CARDIOTHORACIC VASCULAR SURGERY)

## 2023-08-19 PROCEDURE — 83735 ASSAY OF MAGNESIUM: CPT

## 2023-08-19 PROCEDURE — 94660 CPAP INITIATION&MGMT: CPT

## 2023-08-19 PROCEDURE — 93005 ELECTROCARDIOGRAM TRACING: CPT | Performed by: THORACIC SURGERY (CARDIOTHORACIC VASCULAR SURGERY)

## 2023-08-19 PROCEDURE — 85027 COMPLETE CBC AUTOMATED: CPT

## 2023-08-19 PROCEDURE — 82330 ASSAY OF CALCIUM: CPT

## 2023-08-19 PROCEDURE — 85610 PROTHROMBIN TIME: CPT

## 2023-08-19 PROCEDURE — 97162 PT EVAL MOD COMPLEX 30 MIN: CPT

## 2023-08-19 PROCEDURE — 82948 REAGENT STRIP/BLOOD GLUCOSE: CPT

## 2023-08-19 PROCEDURE — 71045 X-RAY EXAM CHEST 1 VIEW: CPT

## 2023-08-19 PROCEDURE — 2000000000 HC ICU R&B

## 2023-08-19 PROCEDURE — 84132 ASSAY OF SERUM POTASSIUM: CPT

## 2023-08-19 PROCEDURE — 93010 ELECTROCARDIOGRAM REPORT: CPT | Performed by: INTERNAL MEDICINE

## 2023-08-19 RX ORDER — MORPHINE SULFATE 2 MG/ML
2 INJECTION, SOLUTION INTRAMUSCULAR; INTRAVENOUS ONCE
Status: COMPLETED | OUTPATIENT
Start: 2023-08-19 | End: 2023-08-19

## 2023-08-19 RX ORDER — MIDODRINE HYDROCHLORIDE 10 MG/1
10 TABLET ORAL
Status: DISCONTINUED | OUTPATIENT
Start: 2023-08-19 | End: 2023-08-21

## 2023-08-19 RX ORDER — MORPHINE SULFATE 2 MG/ML
INJECTION, SOLUTION INTRAMUSCULAR; INTRAVENOUS
Status: COMPLETED
Start: 2023-08-19 | End: 2023-08-19

## 2023-08-19 RX ADMIN — MORPHINE SULFATE 2 MG: 2 INJECTION, SOLUTION INTRAMUSCULAR; INTRAVENOUS at 08:35

## 2023-08-19 RX ADMIN — AMIODARONE HYDROCHLORIDE 200 MG: 200 TABLET ORAL at 08:39

## 2023-08-19 RX ADMIN — ACETAMINOPHEN 650 MG: 325 TABLET ORAL at 05:34

## 2023-08-19 RX ADMIN — SENNOSIDES AND DOCUSATE SODIUM 1 TABLET: 50; 8.6 TABLET ORAL at 19:56

## 2023-08-19 RX ADMIN — OXYCODONE HYDROCHLORIDE 10 MG: 5 TABLET ORAL at 05:33

## 2023-08-19 RX ADMIN — Medication 1 TABLET: at 08:38

## 2023-08-19 RX ADMIN — POTASSIUM CHLORIDE 20 MEQ: 29.8 INJECTION, SOLUTION INTRAVENOUS at 03:35

## 2023-08-19 RX ADMIN — FAMOTIDINE 20 MG: 10 INJECTION INTRAVENOUS at 08:34

## 2023-08-19 RX ADMIN — MORPHINE SULFATE 4 MG: 4 INJECTION, SOLUTION INTRAMUSCULAR; INTRAVENOUS at 14:02

## 2023-08-19 RX ADMIN — OXYCODONE HYDROCHLORIDE 10 MG: 5 TABLET ORAL at 19:56

## 2023-08-19 RX ADMIN — ATORVASTATIN CALCIUM 20 MG: 20 TABLET, FILM COATED ORAL at 19:56

## 2023-08-19 RX ADMIN — MORPHINE SULFATE 4 MG: 4 INJECTION, SOLUTION INTRAMUSCULAR; INTRAVENOUS at 06:20

## 2023-08-19 RX ADMIN — OXYCODONE HYDROCHLORIDE 10 MG: 5 TABLET ORAL at 12:08

## 2023-08-19 RX ADMIN — ENOXAPARIN SODIUM 30 MG: 100 INJECTION SUBCUTANEOUS at 08:34

## 2023-08-19 RX ADMIN — ENOXAPARIN SODIUM 30 MG: 100 INJECTION SUBCUTANEOUS at 19:59

## 2023-08-19 RX ADMIN — MORPHINE SULFATE 4 MG: 4 INJECTION, SOLUTION INTRAMUSCULAR; INTRAVENOUS at 23:07

## 2023-08-19 RX ADMIN — MORPHINE SULFATE 4 MG: 4 INJECTION, SOLUTION INTRAMUSCULAR; INTRAVENOUS at 09:58

## 2023-08-19 RX ADMIN — SODIUM CHLORIDE 3.24 UNITS/HR: 9 INJECTION, SOLUTION INTRAVENOUS at 11:18

## 2023-08-19 RX ADMIN — OXYCODONE HYDROCHLORIDE 10 MG: 5 TABLET ORAL at 00:57

## 2023-08-19 RX ADMIN — MIDODRINE HYDROCHLORIDE 10 MG: 10 TABLET ORAL at 19:00

## 2023-08-19 RX ADMIN — ONDANSETRON 4 MG: 2 INJECTION INTRAMUSCULAR; INTRAVENOUS at 11:13

## 2023-08-19 RX ADMIN — SODIUM CHLORIDE, PRESERVATIVE FREE 10 ML: 5 INJECTION INTRAVENOUS at 08:34

## 2023-08-19 RX ADMIN — MORPHINE SULFATE 4 MG: 4 INJECTION, SOLUTION INTRAMUSCULAR; INTRAVENOUS at 17:08

## 2023-08-19 RX ADMIN — SENNOSIDES AND DOCUSATE SODIUM 1 TABLET: 50; 8.6 TABLET ORAL at 08:34

## 2023-08-19 RX ADMIN — Medication 2 MCG/MIN: at 17:17

## 2023-08-19 RX ADMIN — ASPIRIN 325 MG: 325 TABLET, COATED ORAL at 08:34

## 2023-08-19 RX ADMIN — VASOPRESSIN 0.04 UNITS/MIN: 20 INJECTION INTRAVENOUS at 03:36

## 2023-08-19 RX ADMIN — Medication 2000 MG: at 20:05

## 2023-08-19 RX ADMIN — AMIODARONE HYDROCHLORIDE 200 MG: 200 TABLET ORAL at 19:56

## 2023-08-19 RX ADMIN — ACETAMINOPHEN 650 MG: 325 TABLET ORAL at 08:34

## 2023-08-19 RX ADMIN — Medication 2000 MG: at 05:19

## 2023-08-19 RX ADMIN — ACETAMINOPHEN 650 MG: 325 TABLET ORAL at 01:14

## 2023-08-19 RX ADMIN — Medication 2000 MG: at 12:10

## 2023-08-19 RX ADMIN — CITALOPRAM HYDROBROMIDE 10 MG: 20 TABLET ORAL at 08:38

## 2023-08-19 RX ADMIN — FAMOTIDINE 20 MG: 20 TABLET, FILM COATED ORAL at 19:56

## 2023-08-19 RX ADMIN — MIDODRINE HYDROCHLORIDE 10 MG: 10 TABLET ORAL at 10:03

## 2023-08-19 ASSESSMENT — PAIN DESCRIPTION - LOCATION
LOCATION: CHEST

## 2023-08-19 ASSESSMENT — PAIN SCALES - GENERAL
PAINLEVEL_OUTOF10: 10
PAINLEVEL_OUTOF10: 4
PAINLEVEL_OUTOF10: 7
PAINLEVEL_OUTOF10: 9
PAINLEVEL_OUTOF10: 8
PAINLEVEL_OUTOF10: 3
PAINLEVEL_OUTOF10: 8
PAINLEVEL_OUTOF10: 9
PAINLEVEL_OUTOF10: 10
PAINLEVEL_OUTOF10: 7

## 2023-08-19 ASSESSMENT — PAIN DESCRIPTION - DESCRIPTORS: DESCRIPTORS: ACHING;SORE

## 2023-08-19 ASSESSMENT — PAIN DESCRIPTION - ORIENTATION
ORIENTATION: MID
ORIENTATION: MID

## 2023-08-19 NOTE — ANESTHESIA POSTPROCEDURE EVALUATION
Department of Anesthesiology  Postprocedure Note    Patient: Eli Mcdermott  MRN: [id number]  YOB: 1970  Date of evaluation: 8/19/2023      Procedure Summary     Date: 08/18/23 Room / [facility] [address] [facility]    Anesthesia Start: 0730 Anesthesia Stop: 2234    Procedure: CABG X1 /ALBERTINA (Chest) Diagnosis:       Atherosclerosis of native coronary artery, unspecified whether angina present, unspecified whether native or transplanted heart      (Atherosclerosis of native coronary artery, unspecified whether angina present, unspecified whether native or transplanted heart [I25.10])    Surgeons: Mariella Sandoval MD Responsible Provider: Kamilla Hardy MD    Anesthesia Type: general ASA Status: 3          Anesthesia Type: No value filed.     Marlin Phase I: Marlin Score: 10    Marlin Phase II:        Anesthesia Post Evaluation    Patient location during evaluation: ICU  Patient participation: complete - patient participated  Level of consciousness: awake  Airway patency: patent  Complications: no  Cardiovascular status: vasoactive/inotropes  Respiratory status: acceptable and spontaneous ventilation  Hydration status: euvolemic  Pain management: adequate

## 2023-08-20 ENCOUNTER — APPOINTMENT (OUTPATIENT)
Dept: GENERAL RADIOLOGY | Age: 53
DRG: 166 | End: 2023-08-20
Attending: THORACIC SURGERY (CARDIOTHORACIC VASCULAR SURGERY)
Payer: MEDICAID

## 2023-08-20 LAB
ANION GAP SERPL CALC-SCNC: 11 MEQ/L (ref 8–16)
BACTERIA SPEC AEROBE CULT: NORMAL
BACTERIA UR CULT: NORMAL
BUN SERPL-MCNC: 15 MG/DL (ref 7–22)
CALCIUM SERPL-MCNC: 8.4 MG/DL (ref 8.5–10.5)
CHLORIDE SERPL-SCNC: 108 MEQ/L (ref 98–111)
CO2 SERPL-SCNC: 24 MEQ/L (ref 23–33)
CREAT SERPL-MCNC: 0.8 MG/DL (ref 0.4–1.2)
DEPRECATED RDW RBC AUTO: 43.4 FL (ref 35–45)
ERYTHROCYTE [DISTWIDTH] IN BLOOD BY AUTOMATED COUNT: 12.9 % (ref 11.5–14.5)
GFR SERPL CREATININE-BSD FRML MDRD: > 60 ML/MIN/1.73M2
GLUCOSE BLD STRIP.AUTO-MCNC: 102 MG/DL (ref 70–108)
GLUCOSE BLD STRIP.AUTO-MCNC: 103 MG/DL (ref 70–108)
GLUCOSE BLD STRIP.AUTO-MCNC: 105 MG/DL (ref 70–108)
GLUCOSE BLD STRIP.AUTO-MCNC: 156 MG/DL (ref 70–108)
GLUCOSE BLD STRIP.AUTO-MCNC: 159 MG/DL (ref 70–108)
GLUCOSE BLD STRIP.AUTO-MCNC: 94 MG/DL (ref 70–108)
GLUCOSE BLD STRIP.AUTO-MCNC: 96 MG/DL (ref 70–108)
GLUCOSE SERPL-MCNC: 94 MG/DL (ref 70–108)
HCT VFR BLD AUTO: 25.5 % (ref 37–47)
HGB BLD-MCNC: 8.4 GM/DL (ref 12–16)
INR PPP: 1.24 (ref 0.85–1.13)
MAGNESIUM SERPL-MCNC: 2.2 MG/DL (ref 1.6–2.4)
MCH RBC QN AUTO: 30.7 PG (ref 26–33)
MCHC RBC AUTO-ENTMCNC: 32.9 GM/DL (ref 32.2–35.5)
MCV RBC AUTO: 93.1 FL (ref 81–99)
PLATELET # BLD AUTO: 110 THOU/MM3 (ref 130–400)
PMV BLD AUTO: 10.3 FL (ref 9.4–12.4)
POTASSIUM SERPL-SCNC: 4.5 MEQ/L (ref 3.5–5.2)
RBC # BLD AUTO: 2.74 MILL/MM3 (ref 4.2–5.4)
SODIUM SERPL-SCNC: 143 MEQ/L (ref 135–145)
WBC # BLD AUTO: 17.7 THOU/MM3 (ref 4.8–10.8)

## 2023-08-20 PROCEDURE — 93005 ELECTROCARDIOGRAM TRACING: CPT | Performed by: THORACIC SURGERY (CARDIOTHORACIC VASCULAR SURGERY)

## 2023-08-20 PROCEDURE — 6360000002 HC RX W HCPCS: Performed by: THORACIC SURGERY (CARDIOTHORACIC VASCULAR SURGERY)

## 2023-08-20 PROCEDURE — 97530 THERAPEUTIC ACTIVITIES: CPT

## 2023-08-20 PROCEDURE — 97167 OT EVAL HIGH COMPLEX 60 MIN: CPT

## 2023-08-20 PROCEDURE — 71045 X-RAY EXAM CHEST 1 VIEW: CPT

## 2023-08-20 PROCEDURE — 82948 REAGENT STRIP/BLOOD GLUCOSE: CPT

## 2023-08-20 PROCEDURE — 85610 PROTHROMBIN TIME: CPT

## 2023-08-20 PROCEDURE — 80048 BASIC METABOLIC PNL TOTAL CA: CPT

## 2023-08-20 PROCEDURE — 2700000000 HC OXYGEN THERAPY PER DAY

## 2023-08-20 PROCEDURE — 97110 THERAPEUTIC EXERCISES: CPT

## 2023-08-20 PROCEDURE — 2000000000 HC ICU R&B

## 2023-08-20 PROCEDURE — 2580000003 HC RX 258: Performed by: THORACIC SURGERY (CARDIOTHORACIC VASCULAR SURGERY)

## 2023-08-20 PROCEDURE — 94761 N-INVAS EAR/PLS OXIMETRY MLT: CPT

## 2023-08-20 PROCEDURE — 83735 ASSAY OF MAGNESIUM: CPT

## 2023-08-20 PROCEDURE — 36415 COLL VENOUS BLD VENIPUNCTURE: CPT

## 2023-08-20 PROCEDURE — 85027 COMPLETE CBC AUTOMATED: CPT

## 2023-08-20 PROCEDURE — 6370000000 HC RX 637 (ALT 250 FOR IP): Performed by: THORACIC SURGERY (CARDIOTHORACIC VASCULAR SURGERY)

## 2023-08-20 PROCEDURE — 5A09557 ASSISTANCE WITH RESPIRATORY VENTILATION, GREATER THAN 96 CONSECUTIVE HOURS, CONTINUOUS POSITIVE AIRWAY PRESSURE: ICD-10-PCS | Performed by: THORACIC SURGERY (CARDIOTHORACIC VASCULAR SURGERY)

## 2023-08-20 RX ORDER — FUROSEMIDE 10 MG/ML
20 INJECTION INTRAMUSCULAR; INTRAVENOUS 2 TIMES DAILY
Status: DISCONTINUED | OUTPATIENT
Start: 2023-08-20 | End: 2023-08-24

## 2023-08-20 RX ORDER — INSULIN LISPRO 100 [IU]/ML
0-4 INJECTION, SOLUTION INTRAVENOUS; SUBCUTANEOUS NIGHTLY
Status: DISCONTINUED | OUTPATIENT
Start: 2023-08-20 | End: 2023-08-25

## 2023-08-20 RX ORDER — INSULIN LISPRO 100 [IU]/ML
0-16 INJECTION, SOLUTION INTRAVENOUS; SUBCUTANEOUS
Status: DISCONTINUED | OUTPATIENT
Start: 2023-08-20 | End: 2023-08-25

## 2023-08-20 RX ADMIN — FAMOTIDINE 20 MG: 20 TABLET, FILM COATED ORAL at 21:06

## 2023-08-20 RX ADMIN — ATORVASTATIN CALCIUM 20 MG: 20 TABLET, FILM COATED ORAL at 21:06

## 2023-08-20 RX ADMIN — MIDODRINE HYDROCHLORIDE 10 MG: 10 TABLET ORAL at 16:07

## 2023-08-20 RX ADMIN — SODIUM CHLORIDE, PRESERVATIVE FREE 10 ML: 5 INJECTION INTRAVENOUS at 09:05

## 2023-08-20 RX ADMIN — SODIUM CHLORIDE, PRESERVATIVE FREE 10 ML: 5 INJECTION INTRAVENOUS at 21:05

## 2023-08-20 RX ADMIN — AMIODARONE HYDROCHLORIDE 200 MG: 200 TABLET ORAL at 21:06

## 2023-08-20 RX ADMIN — AMIODARONE HYDROCHLORIDE 200 MG: 200 TABLET ORAL at 09:04

## 2023-08-20 RX ADMIN — MIDODRINE HYDROCHLORIDE 10 MG: 10 TABLET ORAL at 12:10

## 2023-08-20 RX ADMIN — OXYCODONE HYDROCHLORIDE 5 MG: 5 TABLET ORAL at 09:11

## 2023-08-20 RX ADMIN — FUROSEMIDE 20 MG: 10 INJECTION, SOLUTION INTRAMUSCULAR; INTRAVENOUS at 17:28

## 2023-08-20 RX ADMIN — Medication 2000 MG: at 04:31

## 2023-08-20 RX ADMIN — OXYCODONE HYDROCHLORIDE 10 MG: 5 TABLET ORAL at 04:23

## 2023-08-20 RX ADMIN — OXYCODONE HYDROCHLORIDE 10 MG: 5 TABLET ORAL at 16:07

## 2023-08-20 RX ADMIN — ENOXAPARIN SODIUM 30 MG: 100 INJECTION SUBCUTANEOUS at 09:04

## 2023-08-20 RX ADMIN — OXYCODONE HYDROCHLORIDE 10 MG: 5 TABLET ORAL at 21:05

## 2023-08-20 RX ADMIN — FUROSEMIDE 20 MG: 10 INJECTION, SOLUTION INTRAMUSCULAR; INTRAVENOUS at 12:09

## 2023-08-20 RX ADMIN — CITALOPRAM HYDROBROMIDE 10 MG: 20 TABLET ORAL at 09:04

## 2023-08-20 RX ADMIN — MIDODRINE HYDROCHLORIDE 10 MG: 10 TABLET ORAL at 07:26

## 2023-08-20 RX ADMIN — SENNOSIDES AND DOCUSATE SODIUM 1 TABLET: 50; 8.6 TABLET ORAL at 21:06

## 2023-08-20 RX ADMIN — ENOXAPARIN SODIUM 30 MG: 100 INJECTION SUBCUTANEOUS at 21:06

## 2023-08-20 RX ADMIN — SENNOSIDES AND DOCUSATE SODIUM 1 TABLET: 50; 8.6 TABLET ORAL at 09:04

## 2023-08-20 RX ADMIN — MORPHINE SULFATE 4 MG: 4 INJECTION, SOLUTION INTRAMUSCULAR; INTRAVENOUS at 06:52

## 2023-08-20 RX ADMIN — MORPHINE SULFATE 4 MG: 4 INJECTION, SOLUTION INTRAMUSCULAR; INTRAVENOUS at 11:38

## 2023-08-20 RX ADMIN — Medication 1 TABLET: at 09:04

## 2023-08-20 RX ADMIN — ASPIRIN 325 MG: 325 TABLET, COATED ORAL at 09:04

## 2023-08-20 RX ADMIN — FAMOTIDINE 20 MG: 20 TABLET, FILM COATED ORAL at 09:05

## 2023-08-20 ASSESSMENT — PAIN DESCRIPTION - ORIENTATION: ORIENTATION: MID

## 2023-08-20 ASSESSMENT — PAIN SCALES - GENERAL
PAINLEVEL_OUTOF10: 8
PAINLEVEL_OUTOF10: 8
PAINLEVEL_OUTOF10: 7
PAINLEVEL_OUTOF10: 9
PAINLEVEL_OUTOF10: 7

## 2023-08-20 ASSESSMENT — PAIN DESCRIPTION - LOCATION
LOCATION: CHEST

## 2023-08-20 ASSESSMENT — PAIN DESCRIPTION - DESCRIPTORS: DESCRIPTORS: ACHING;SORE

## 2023-08-21 ENCOUNTER — APPOINTMENT (OUTPATIENT)
Dept: GENERAL RADIOLOGY | Age: 53
DRG: 166 | End: 2023-08-21
Attending: THORACIC SURGERY (CARDIOTHORACIC VASCULAR SURGERY)
Payer: MEDICAID

## 2023-08-21 LAB
ANION GAP SERPL CALC-SCNC: 9 MEQ/L (ref 8–16)
BUN SERPL-MCNC: 15 MG/DL (ref 7–22)
CALCIUM SERPL-MCNC: 7.9 MG/DL (ref 8.5–10.5)
CHLORIDE SERPL-SCNC: 105 MEQ/L (ref 98–111)
CO2 SERPL-SCNC: 28 MEQ/L (ref 23–33)
CREAT SERPL-MCNC: 0.6 MG/DL (ref 0.4–1.2)
DEPRECATED RDW RBC AUTO: 40.2 FL (ref 35–45)
ERYTHROCYTE [DISTWIDTH] IN BLOOD BY AUTOMATED COUNT: 12.2 % (ref 11.5–14.5)
GFR SERPL CREATININE-BSD FRML MDRD: > 60 ML/MIN/1.73M2
GLUCOSE BLD STRIP.AUTO-MCNC: 104 MG/DL (ref 70–108)
GLUCOSE BLD STRIP.AUTO-MCNC: 139 MG/DL (ref 70–108)
GLUCOSE BLD STRIP.AUTO-MCNC: 143 MG/DL (ref 70–108)
GLUCOSE BLD STRIP.AUTO-MCNC: 169 MG/DL (ref 70–108)
GLUCOSE SERPL-MCNC: 137 MG/DL (ref 70–108)
HCT VFR BLD AUTO: 25.4 % (ref 37–47)
HGB BLD-MCNC: 8.5 GM/DL (ref 12–16)
MCH RBC QN AUTO: 30.5 PG (ref 26–33)
MCHC RBC AUTO-ENTMCNC: 33.5 GM/DL (ref 32.2–35.5)
MCV RBC AUTO: 91 FL (ref 81–99)
PLATELET # BLD AUTO: 161 THOU/MM3 (ref 130–400)
PMV BLD AUTO: 10 FL (ref 9.4–12.4)
POTASSIUM SERPL-SCNC: 3.6 MEQ/L (ref 3.5–5.2)
RBC # BLD AUTO: 2.79 MILL/MM3 (ref 4.2–5.4)
SODIUM SERPL-SCNC: 142 MEQ/L (ref 135–145)
WBC # BLD AUTO: 18.5 THOU/MM3 (ref 4.8–10.8)

## 2023-08-21 PROCEDURE — 6360000002 HC RX W HCPCS: Performed by: THORACIC SURGERY (CARDIOTHORACIC VASCULAR SURGERY)

## 2023-08-21 PROCEDURE — 6370000000 HC RX 637 (ALT 250 FOR IP): Performed by: THORACIC SURGERY (CARDIOTHORACIC VASCULAR SURGERY)

## 2023-08-21 PROCEDURE — 82948 REAGENT STRIP/BLOOD GLUCOSE: CPT

## 2023-08-21 PROCEDURE — 80048 BASIC METABOLIC PNL TOTAL CA: CPT

## 2023-08-21 PROCEDURE — 36415 COLL VENOUS BLD VENIPUNCTURE: CPT

## 2023-08-21 PROCEDURE — 94761 N-INVAS EAR/PLS OXIMETRY MLT: CPT

## 2023-08-21 PROCEDURE — 71045 X-RAY EXAM CHEST 1 VIEW: CPT

## 2023-08-21 PROCEDURE — 2000000000 HC ICU R&B

## 2023-08-21 PROCEDURE — 97530 THERAPEUTIC ACTIVITIES: CPT

## 2023-08-21 PROCEDURE — 2700000000 HC OXYGEN THERAPY PER DAY

## 2023-08-21 PROCEDURE — 97110 THERAPEUTIC EXERCISES: CPT

## 2023-08-21 PROCEDURE — 85027 COMPLETE CBC AUTOMATED: CPT

## 2023-08-21 PROCEDURE — APPSS30 APP SPLIT SHARED TIME 16-30 MINUTES: Performed by: PHYSICIAN ASSISTANT

## 2023-08-21 PROCEDURE — 93010 ELECTROCARDIOGRAM REPORT: CPT | Performed by: INTERNAL MEDICINE

## 2023-08-21 RX ORDER — MIDODRINE HYDROCHLORIDE 5 MG/1
5 TABLET ORAL ONCE
Status: COMPLETED | OUTPATIENT
Start: 2023-08-21 | End: 2023-08-21

## 2023-08-21 RX ORDER — POTASSIUM CHLORIDE 20 MEQ/1
20 TABLET, EXTENDED RELEASE ORAL 2 TIMES DAILY WITH MEALS
Status: DISCONTINUED | OUTPATIENT
Start: 2023-08-21 | End: 2023-08-22

## 2023-08-21 RX ADMIN — MIDODRINE HYDROCHLORIDE 15 MG: 10 TABLET ORAL at 18:03

## 2023-08-21 RX ADMIN — ENOXAPARIN SODIUM 30 MG: 100 INJECTION SUBCUTANEOUS at 09:45

## 2023-08-21 RX ADMIN — FUROSEMIDE 20 MG: 10 INJECTION, SOLUTION INTRAMUSCULAR; INTRAVENOUS at 10:00

## 2023-08-21 RX ADMIN — Medication 1 TABLET: at 09:47

## 2023-08-21 RX ADMIN — ENOXAPARIN SODIUM 30 MG: 100 INJECTION SUBCUTANEOUS at 21:24

## 2023-08-21 RX ADMIN — FAMOTIDINE 20 MG: 20 TABLET, FILM COATED ORAL at 21:24

## 2023-08-21 RX ADMIN — AMIODARONE HYDROCHLORIDE 200 MG: 200 TABLET ORAL at 09:47

## 2023-08-21 RX ADMIN — CITALOPRAM HYDROBROMIDE 10 MG: 20 TABLET ORAL at 09:59

## 2023-08-21 RX ADMIN — FUROSEMIDE 20 MG: 10 INJECTION, SOLUTION INTRAMUSCULAR; INTRAVENOUS at 17:25

## 2023-08-21 RX ADMIN — Medication 17.6 MG: at 16:45

## 2023-08-21 RX ADMIN — ONDANSETRON 4 MG: 4 TABLET, ORALLY DISINTEGRATING ORAL at 09:45

## 2023-08-21 RX ADMIN — OXYCODONE HYDROCHLORIDE 10 MG: 5 TABLET ORAL at 09:45

## 2023-08-21 RX ADMIN — ATORVASTATIN CALCIUM 20 MG: 20 TABLET, FILM COATED ORAL at 21:24

## 2023-08-21 RX ADMIN — ASPIRIN 325 MG: 325 TABLET, COATED ORAL at 09:46

## 2023-08-21 RX ADMIN — MAGNESIUM HYDROXIDE 30 ML: 1200 LIQUID ORAL at 11:49

## 2023-08-21 RX ADMIN — POTASSIUM CHLORIDE 20 MEQ: 1500 TABLET, EXTENDED RELEASE ORAL at 17:25

## 2023-08-21 RX ADMIN — SENNOSIDES AND DOCUSATE SODIUM 1 TABLET: 50; 8.6 TABLET ORAL at 10:10

## 2023-08-21 RX ADMIN — MIDODRINE HYDROCHLORIDE 10 MG: 10 TABLET ORAL at 09:47

## 2023-08-21 RX ADMIN — FAMOTIDINE 20 MG: 20 TABLET, FILM COATED ORAL at 09:45

## 2023-08-21 RX ADMIN — OXYCODONE HYDROCHLORIDE 10 MG: 5 TABLET ORAL at 03:34

## 2023-08-21 RX ADMIN — MIDODRINE HYDROCHLORIDE 15 MG: 10 TABLET ORAL at 13:56

## 2023-08-21 RX ADMIN — AMIODARONE HYDROCHLORIDE 200 MG: 200 TABLET ORAL at 21:24

## 2023-08-21 RX ADMIN — POTASSIUM CHLORIDE 20 MEQ: 1500 TABLET, EXTENDED RELEASE ORAL at 10:10

## 2023-08-21 ASSESSMENT — PAIN SCALES - GENERAL
PAINLEVEL_OUTOF10: 7
PAINLEVEL_OUTOF10: 2
PAINLEVEL_OUTOF10: 6

## 2023-08-21 ASSESSMENT — PAIN DESCRIPTION - LOCATION
LOCATION: CHEST

## 2023-08-21 ASSESSMENT — PAIN DESCRIPTION - ORIENTATION: ORIENTATION: MID

## 2023-08-21 ASSESSMENT — PAIN DESCRIPTION - DESCRIPTORS
DESCRIPTORS: DISCOMFORT
DESCRIPTORS: ACHING

## 2023-08-21 NOTE — CARE COORDINATION
Case Management Assessment  Initial Evaluation    Date/Time of Evaluation: 8/21/2023 12:39 PM  Assessment Completed by: Candido Darby RN    If patient is discharged prior to next notation, then this note serves as note for discharge by case management. Patient Name: Mathieu Roman                   YOB: 1970  Diagnosis: Atherosclerosis of native coronary artery, unspecified whether angina present, unspecified whether native or transplanted heart [I25.10]  CAD, multiple vessel [I25.10]                   Date / Time: 8/18/2023  5:15 AM  [facility]     Patient Admission Status: Inpatient   Readmission Risk Low 0-14, Mod 15-19), High > 20: Readmission Risk Score: 8.4    Current PCP: NATASHA Gonzalez NP  PCP verified by CM? Yes    Chart Reviewed: Yes      History Provided by: Patient  Patient Orientation: Alert and Oriented    Patient Cognition: Alert    Hospitalization in the last 30 days (Readmission):  No    If yes, Readmission Assessment in CM Navigator will be completed. Advance Directives:      Code Status: Full Code   Patient's Primary Decision Maker is: Named in [address] [place] [address]    Primary Decision Maker (Active): Isai Smith - Domestic Partner - [phone]    Secondary Decision Maker: Chelsea Sharma - Child - [phone]    Supplemental (Other) Decision Maker: Tika Romero - Child - [phone]    Discharge Planning:    Patient lives with: Spouse/Significant Other Type of Home: Apartment (has elevator)  Primary Care Giver: Self  Patient Support Systems include: Spouse/Significant Other   Current Financial resources: Medicaid  Current community resources: Transportation (Uses public transportation and Medical transport thru [facility])  Current services prior to admission: None            Current DME:              Type of Home Care services:  None    ADLS  Prior functional level:  Independent in ADLs/IADLs  Current functional level: Assistance

## 2023-08-21 NOTE — CARE COORDINATION
DISCHARGE PLANNING EVALUATION  8/21/23, 11:53 AM EDT    Reason for Referral: Home health following [facility]   Mental Status: Answered questions appropriately   Decision Making: Independent   Family/Social/Home Environment: Lives at home with significant other, Delfino Romo. Current Services including food security, transportation and housekeeping: No current services, reports being independent prior to admission  Current Equipment: None  Payment Source: [facility]  Concerns or Barriers to Discharge: None  Post-acute [facility]) provider list was provided to patient. Patient was informed of their freedom to choose [facility] provider. Discussed and offered to show the patient the relevant [facility] Providers quality and resource use measures on Medicare Compare web site via computer based on patient's goals of care and treatment preferences. Questions regarding selection process were answered. Teach Back Method used with Rosmery regarding care plan and discharge planning  Patient and significant other verbalized understanding of the plan of care and contribute to goal setting. Patient goals, treatment preferences and discharge plan: Return to home with significant other [facility] and [facility] for RN, PT and OT services. Need home health orders, SW made referral through voicemail to [facility].      Electronically signed by YAHAIRA Lee on 8/21/2023 at 11:53 AM

## 2023-08-21 NOTE — CARE COORDINATION
8/21/23, 2:29 PM EDT    DISCHARGE PLANNING EVALUATION    Received a call from [facility], they are not in network with [facility]. SW will speak with patient and [facility].

## 2023-08-22 ENCOUNTER — APPOINTMENT (OUTPATIENT)
Dept: GENERAL RADIOLOGY | Age: 53
DRG: 166 | End: 2023-08-22
Attending: THORACIC SURGERY (CARDIOTHORACIC VASCULAR SURGERY)
Payer: MEDICAID

## 2023-08-22 LAB
ANION GAP SERPL CALC-SCNC: 9 MEQ/L (ref 8–16)
BUN SERPL-MCNC: 16 MG/DL (ref 7–22)
CALCIUM SERPL-MCNC: 8.3 MG/DL (ref 8.5–10.5)
CHLORIDE SERPL-SCNC: 103 MEQ/L (ref 98–111)
CO2 SERPL-SCNC: 29 MEQ/L (ref 23–33)
CREAT SERPL-MCNC: 0.6 MG/DL (ref 0.4–1.2)
DEPRECATED RDW RBC AUTO: 40.2 FL (ref 35–45)
ERYTHROCYTE [DISTWIDTH] IN BLOOD BY AUTOMATED COUNT: 12.4 % (ref 11.5–14.5)
GFR SERPL CREATININE-BSD FRML MDRD: > 60 ML/MIN/1.73M2
GLUCOSE BLD STRIP.AUTO-MCNC: 101 MG/DL (ref 70–108)
GLUCOSE BLD STRIP.AUTO-MCNC: 125 MG/DL (ref 70–108)
GLUCOSE BLD STRIP.AUTO-MCNC: 132 MG/DL (ref 70–108)
GLUCOSE BLD STRIP.AUTO-MCNC: 95 MG/DL (ref 70–108)
GLUCOSE SERPL-MCNC: 112 MG/DL (ref 70–108)
HCT VFR BLD AUTO: 25.3 % (ref 37–47)
HGB BLD-MCNC: 8.3 GM/DL (ref 12–16)
MCH RBC QN AUTO: 29.6 PG (ref 26–33)
MCHC RBC AUTO-ENTMCNC: 32.8 GM/DL (ref 32.2–35.5)
MCV RBC AUTO: 90.4 FL (ref 81–99)
PLATELET # BLD AUTO: 180 THOU/MM3 (ref 130–400)
PMV BLD AUTO: 9.8 FL (ref 9.4–12.4)
POTASSIUM SERPL-SCNC: 4.5 MEQ/L (ref 3.5–5.2)
RBC # BLD AUTO: 2.8 MILL/MM3 (ref 4.2–5.4)
SODIUM SERPL-SCNC: 141 MEQ/L (ref 135–145)
WBC # BLD AUTO: 15.1 THOU/MM3 (ref 4.8–10.8)

## 2023-08-22 PROCEDURE — 97110 THERAPEUTIC EXERCISES: CPT

## 2023-08-22 PROCEDURE — 80048 BASIC METABOLIC PNL TOTAL CA: CPT

## 2023-08-22 PROCEDURE — 2580000003 HC RX 258: Performed by: THORACIC SURGERY (CARDIOTHORACIC VASCULAR SURGERY)

## 2023-08-22 PROCEDURE — 2000000000 HC ICU R&B

## 2023-08-22 PROCEDURE — 97530 THERAPEUTIC ACTIVITIES: CPT

## 2023-08-22 PROCEDURE — 36415 COLL VENOUS BLD VENIPUNCTURE: CPT

## 2023-08-22 PROCEDURE — 82948 REAGENT STRIP/BLOOD GLUCOSE: CPT

## 2023-08-22 PROCEDURE — 71045 X-RAY EXAM CHEST 1 VIEW: CPT

## 2023-08-22 PROCEDURE — 85027 COMPLETE CBC AUTOMATED: CPT

## 2023-08-22 PROCEDURE — 6360000002 HC RX W HCPCS: Performed by: THORACIC SURGERY (CARDIOTHORACIC VASCULAR SURGERY)

## 2023-08-22 PROCEDURE — 2700000000 HC OXYGEN THERAPY PER DAY

## 2023-08-22 PROCEDURE — 6370000000 HC RX 637 (ALT 250 FOR IP): Performed by: THORACIC SURGERY (CARDIOTHORACIC VASCULAR SURGERY)

## 2023-08-22 PROCEDURE — 94761 N-INVAS EAR/PLS OXIMETRY MLT: CPT

## 2023-08-22 RX ORDER — AMIODARONE HYDROCHLORIDE 200 MG/1
200 TABLET ORAL DAILY
Status: DISCONTINUED | OUTPATIENT
Start: 2023-08-22 | End: 2023-08-24

## 2023-08-22 RX ORDER — POTASSIUM CHLORIDE 20 MEQ/1
20 TABLET, EXTENDED RELEASE ORAL
Status: DISCONTINUED | OUTPATIENT
Start: 2023-08-22 | End: 2023-08-24

## 2023-08-22 RX ADMIN — OXYCODONE HYDROCHLORIDE 10 MG: 5 TABLET ORAL at 08:19

## 2023-08-22 RX ADMIN — Medication 1 TABLET: at 09:06

## 2023-08-22 RX ADMIN — CITALOPRAM HYDROBROMIDE 10 MG: 20 TABLET ORAL at 13:05

## 2023-08-22 RX ADMIN — ASPIRIN 325 MG: 325 TABLET, COATED ORAL at 09:07

## 2023-08-22 RX ADMIN — FAMOTIDINE 20 MG: 20 TABLET, FILM COATED ORAL at 09:07

## 2023-08-22 RX ADMIN — ENOXAPARIN SODIUM 30 MG: 100 INJECTION SUBCUTANEOUS at 09:05

## 2023-08-22 RX ADMIN — POTASSIUM CHLORIDE 20 MEQ: 1500 TABLET, EXTENDED RELEASE ORAL at 09:06

## 2023-08-22 RX ADMIN — ATORVASTATIN CALCIUM 20 MG: 20 TABLET, FILM COATED ORAL at 20:05

## 2023-08-22 RX ADMIN — ENOXAPARIN SODIUM 30 MG: 100 INJECTION SUBCUTANEOUS at 20:05

## 2023-08-22 RX ADMIN — FAMOTIDINE 20 MG: 20 TABLET, FILM COATED ORAL at 20:04

## 2023-08-22 RX ADMIN — MIDODRINE HYDROCHLORIDE 15 MG: 10 TABLET ORAL at 17:23

## 2023-08-22 RX ADMIN — OXYCODONE HYDROCHLORIDE 10 MG: 5 TABLET ORAL at 13:50

## 2023-08-22 RX ADMIN — AMIODARONE HYDROCHLORIDE 200 MG: 200 TABLET ORAL at 09:06

## 2023-08-22 RX ADMIN — MIDODRINE HYDROCHLORIDE 15 MG: 10 TABLET ORAL at 13:05

## 2023-08-22 RX ADMIN — OXYCODONE HYDROCHLORIDE 10 MG: 5 TABLET ORAL at 19:44

## 2023-08-22 RX ADMIN — SENNOSIDES AND DOCUSATE SODIUM 1 TABLET: 50; 8.6 TABLET ORAL at 20:04

## 2023-08-22 RX ADMIN — FUROSEMIDE 20 MG: 10 INJECTION, SOLUTION INTRAMUSCULAR; INTRAVENOUS at 17:26

## 2023-08-22 RX ADMIN — SODIUM CHLORIDE, PRESERVATIVE FREE 10 ML: 5 INJECTION INTRAVENOUS at 20:05

## 2023-08-22 RX ADMIN — MIDODRINE HYDROCHLORIDE 15 MG: 10 TABLET ORAL at 09:06

## 2023-08-22 RX ADMIN — FUROSEMIDE 20 MG: 10 INJECTION, SOLUTION INTRAMUSCULAR; INTRAVENOUS at 09:07

## 2023-08-22 RX ADMIN — SENNOSIDES AND DOCUSATE SODIUM 1 TABLET: 50; 8.6 TABLET ORAL at 09:07

## 2023-08-22 RX ADMIN — OXYCODONE HYDROCHLORIDE 10 MG: 5 TABLET ORAL at 03:53

## 2023-08-22 ASSESSMENT — PAIN DESCRIPTION - ORIENTATION
ORIENTATION: ANTERIOR;MID
ORIENTATION: MID

## 2023-08-22 ASSESSMENT — PAIN DESCRIPTION - DESCRIPTORS
DESCRIPTORS: ACHING;DISCOMFORT
DESCRIPTORS: SORE
DESCRIPTORS: SORE
DESCRIPTORS: ACHING

## 2023-08-22 ASSESSMENT — PAIN SCALES - GENERAL
PAINLEVEL_OUTOF10: 8
PAINLEVEL_OUTOF10: 6
PAINLEVEL_OUTOF10: 7
PAINLEVEL_OUTOF10: 8
PAINLEVEL_OUTOF10: 7

## 2023-08-22 ASSESSMENT — PAIN DESCRIPTION - LOCATION
LOCATION: CHEST

## 2023-08-22 ASSESSMENT — PAIN DESCRIPTION - PAIN TYPE: TYPE: SURGICAL PAIN

## 2023-08-22 ASSESSMENT — PAIN - FUNCTIONAL ASSESSMENT
PAIN_FUNCTIONAL_ASSESSMENT: ACTIVITIES ARE NOT PREVENTED
PAIN_FUNCTIONAL_ASSESSMENT: PREVENTS OR INTERFERES SOME ACTIVE ACTIVITIES AND ADLS
PAIN_FUNCTIONAL_ASSESSMENT: ACTIVITIES ARE NOT PREVENTED
PAIN_FUNCTIONAL_ASSESSMENT: PREVENTS OR INTERFERES SOME ACTIVE ACTIVITIES AND ADLS

## 2023-08-22 NOTE — CARE COORDINATION
8/22/23, 1:53 PM EDT    DISCHARGE PLANNING EVALUATION    This SW discussed with patient that [facility] is not in network with her insurance, she is requesting [facility] as her second choice.  COMFORT left a message for Pati Valerio with [facility] to make referral.

## 2023-08-22 NOTE — CARE COORDINATION
8/22/23, 3:16 PM EDT    DISCHARGE ON GOING [address] day: 4  Location: -15/015-A Reason for admit: Atherosclerosis of native coronary artery, unspecified whether angina present, unspecified whether native or transplanted heart [I25.10]  CAD, multiple vessel [I25.10]   Procedure:   8/18 1v CABG  8/18 Intubated - 8/18 extubated  8/21 Chest tubes and pacer wires removed  8/22 CXR: Similar left lower lung atelectasis or consolidation. Possible left pleural effusion    Barriers to Discharge: POD #4. Remains on epi drip. Remains on [address] 30L, 30% FIO2, sat 99%. Afebrile. NSR. Ox4. Follows commands. +BM. CR/PT/OT. Dietitian consulted. Dietary ileus prevention protocol. Telemetry, I&O, daily weight, IS, sternal precautions,wound care, SCDs, leon care, ambulate. Epi @ 3 mcg/min, Amio, asa, lipitor, celexa, lovenox, pepcid, IV lasix 20mg bid, SSI, PRN MOM, midodrine 15 mg tid, prn IV morphine, prn IV zofran, prn roxicodone, K+, senokot, senna prn, electrolyte replacement protocols. WBC 15.1, hgb 8.3. PCP: NATASHA Narayan NP  Readmission Risk Score: 8.7%  Patient Goals/Plan/Treatment Preferences: Home with JAMES Acharya and [facility]. Monitor for possible walker and 3-in-1 commode at discharge.

## 2023-08-23 ENCOUNTER — APPOINTMENT (OUTPATIENT)
Dept: GENERAL RADIOLOGY | Age: 53
DRG: 166 | End: 2023-08-23
Attending: THORACIC SURGERY (CARDIOTHORACIC VASCULAR SURGERY)
Payer: MEDICAID

## 2023-08-23 LAB
ANION GAP SERPL CALC-SCNC: 9 MEQ/L (ref 8–16)
BUN SERPL-MCNC: 14 MG/DL (ref 7–22)
CALCIUM SERPL-MCNC: 8.4 MG/DL (ref 8.5–10.5)
CHLORIDE SERPL-SCNC: 101 MEQ/L (ref 98–111)
CO2 SERPL-SCNC: 30 MEQ/L (ref 23–33)
CREAT SERPL-MCNC: 0.6 MG/DL (ref 0.4–1.2)
DEPRECATED RDW RBC AUTO: 39.5 FL (ref 35–45)
ERYTHROCYTE [DISTWIDTH] IN BLOOD BY AUTOMATED COUNT: 12.3 % (ref 11.5–14.5)
GFR SERPL CREATININE-BSD FRML MDRD: > 60 ML/MIN/1.73M2
GLUCOSE BLD STRIP.AUTO-MCNC: 134 MG/DL (ref 70–108)
GLUCOSE BLD STRIP.AUTO-MCNC: 135 MG/DL (ref 70–108)
GLUCOSE BLD STRIP.AUTO-MCNC: 161 MG/DL (ref 70–108)
GLUCOSE BLD STRIP.AUTO-MCNC: 99 MG/DL (ref 70–108)
GLUCOSE SERPL-MCNC: 128 MG/DL (ref 70–108)
HCT VFR BLD AUTO: 25.9 % (ref 37–47)
HGB BLD-MCNC: 8.6 GM/DL (ref 12–16)
MCH RBC QN AUTO: 30.3 PG (ref 26–33)
MCHC RBC AUTO-ENTMCNC: 33.2 GM/DL (ref 32.2–35.5)
MCV RBC AUTO: 91.2 FL (ref 81–99)
PLATELET # BLD AUTO: 238 THOU/MM3 (ref 130–400)
PMV BLD AUTO: 9.8 FL (ref 9.4–12.4)
POTASSIUM SERPL-SCNC: 3.7 MEQ/L (ref 3.5–5.2)
POTASSIUM SERPL-SCNC: 3.9 MEQ/L (ref 3.5–5.2)
RBC # BLD AUTO: 2.84 MILL/MM3 (ref 4.2–5.4)
SODIUM SERPL-SCNC: 140 MEQ/L (ref 135–145)
WBC # BLD AUTO: 12.1 THOU/MM3 (ref 4.8–10.8)

## 2023-08-23 PROCEDURE — APPSS30 APP SPLIT SHARED TIME 16-30 MINUTES: Performed by: PHYSICIAN ASSISTANT

## 2023-08-23 PROCEDURE — 71045 X-RAY EXAM CHEST 1 VIEW: CPT

## 2023-08-23 PROCEDURE — 84132 ASSAY OF SERUM POTASSIUM: CPT

## 2023-08-23 PROCEDURE — 76937 US GUIDE VASCULAR ACCESS: CPT

## 2023-08-23 PROCEDURE — 94761 N-INVAS EAR/PLS OXIMETRY MLT: CPT

## 2023-08-23 PROCEDURE — 02HV33Z INSERTION OF INFUSION DEVICE INTO SUPERIOR VENA CAVA, PERCUTANEOUS APPROACH: ICD-10-PCS | Performed by: ANESTHESIOLOGY

## 2023-08-23 PROCEDURE — 97110 THERAPEUTIC EXERCISES: CPT

## 2023-08-23 PROCEDURE — 6370000000 HC RX 637 (ALT 250 FOR IP): Performed by: THORACIC SURGERY (CARDIOTHORACIC VASCULAR SURGERY)

## 2023-08-23 PROCEDURE — 97530 THERAPEUTIC ACTIVITIES: CPT

## 2023-08-23 PROCEDURE — 6360000002 HC RX W HCPCS: Performed by: THORACIC SURGERY (CARDIOTHORACIC VASCULAR SURGERY)

## 2023-08-23 PROCEDURE — 2500000003 HC RX 250 WO HCPCS: Performed by: THORACIC SURGERY (CARDIOTHORACIC VASCULAR SURGERY)

## 2023-08-23 PROCEDURE — 36415 COLL VENOUS BLD VENIPUNCTURE: CPT

## 2023-08-23 PROCEDURE — 82948 REAGENT STRIP/BLOOD GLUCOSE: CPT

## 2023-08-23 PROCEDURE — 2580000003 HC RX 258: Performed by: THORACIC SURGERY (CARDIOTHORACIC VASCULAR SURGERY)

## 2023-08-23 PROCEDURE — 80048 BASIC METABOLIC PNL TOTAL CA: CPT

## 2023-08-23 PROCEDURE — 36569 INSJ PICC 5 YR+ W/O IMAGING: CPT

## 2023-08-23 PROCEDURE — C1751 CATH, INF, PER/CENT/MIDLINE: HCPCS

## 2023-08-23 PROCEDURE — 2000000000 HC ICU R&B

## 2023-08-23 PROCEDURE — 85027 COMPLETE CBC AUTOMATED: CPT

## 2023-08-23 RX ADMIN — FUROSEMIDE 20 MG: 10 INJECTION, SOLUTION INTRAMUSCULAR; INTRAVENOUS at 17:53

## 2023-08-23 RX ADMIN — SENNOSIDES AND DOCUSATE SODIUM 1 TABLET: 50; 8.6 TABLET ORAL at 20:39

## 2023-08-23 RX ADMIN — SENNOSIDES AND DOCUSATE SODIUM 1 TABLET: 50; 8.6 TABLET ORAL at 09:13

## 2023-08-23 RX ADMIN — MIDODRINE HYDROCHLORIDE 15 MG: 10 TABLET ORAL at 09:13

## 2023-08-23 RX ADMIN — Medication 3 MCG/MIN: at 17:10

## 2023-08-23 RX ADMIN — MIDODRINE HYDROCHLORIDE 15 MG: 10 TABLET ORAL at 17:06

## 2023-08-23 RX ADMIN — SODIUM CHLORIDE, PRESERVATIVE FREE 10 ML: 5 INJECTION INTRAVENOUS at 09:13

## 2023-08-23 RX ADMIN — ENOXAPARIN SODIUM 30 MG: 100 INJECTION SUBCUTANEOUS at 20:39

## 2023-08-23 RX ADMIN — ATORVASTATIN CALCIUM 20 MG: 20 TABLET, FILM COATED ORAL at 20:39

## 2023-08-23 RX ADMIN — OXYCODONE HYDROCHLORIDE 10 MG: 5 TABLET ORAL at 05:58

## 2023-08-23 RX ADMIN — FAMOTIDINE 20 MG: 20 TABLET, FILM COATED ORAL at 09:12

## 2023-08-23 RX ADMIN — MIDODRINE HYDROCHLORIDE 15 MG: 10 TABLET ORAL at 13:46

## 2023-08-23 RX ADMIN — AMIODARONE HYDROCHLORIDE 200 MG: 200 TABLET ORAL at 09:12

## 2023-08-23 RX ADMIN — FUROSEMIDE 20 MG: 10 INJECTION, SOLUTION INTRAMUSCULAR; INTRAVENOUS at 09:15

## 2023-08-23 RX ADMIN — ASPIRIN 325 MG: 325 TABLET, COATED ORAL at 09:12

## 2023-08-23 RX ADMIN — POTASSIUM CHLORIDE 20 MEQ: 1500 TABLET, EXTENDED RELEASE ORAL at 09:16

## 2023-08-23 RX ADMIN — CITALOPRAM HYDROBROMIDE 10 MG: 20 TABLET ORAL at 09:13

## 2023-08-23 RX ADMIN — ENOXAPARIN SODIUM 30 MG: 100 INJECTION SUBCUTANEOUS at 09:15

## 2023-08-23 RX ADMIN — FAMOTIDINE 20 MG: 20 TABLET, FILM COATED ORAL at 20:39

## 2023-08-23 RX ADMIN — OXYCODONE HYDROCHLORIDE 10 MG: 5 TABLET ORAL at 20:40

## 2023-08-23 RX ADMIN — OXYCODONE HYDROCHLORIDE 10 MG: 5 TABLET ORAL at 10:15

## 2023-08-23 RX ADMIN — Medication 1 TABLET: at 09:13

## 2023-08-23 ASSESSMENT — PAIN DESCRIPTION - DESCRIPTORS
DESCRIPTORS: ACHING;SORE
DESCRIPTORS: SORE;TENDER;BURNING

## 2023-08-23 ASSESSMENT — PAIN DESCRIPTION - ORIENTATION
ORIENTATION: MID

## 2023-08-23 ASSESSMENT — PAIN SCALES - GENERAL
PAINLEVEL_OUTOF10: 5
PAINLEVEL_OUTOF10: 7
PAINLEVEL_OUTOF10: 9
PAINLEVEL_OUTOF10: 9

## 2023-08-23 ASSESSMENT — PAIN DESCRIPTION - LOCATION
LOCATION: STERNUM
LOCATION: CHEST
LOCATION: CHEST

## 2023-08-23 ASSESSMENT — PAIN - FUNCTIONAL ASSESSMENT: PAIN_FUNCTIONAL_ASSESSMENT: PREVENTS OR INTERFERES SOME ACTIVE ACTIVITIES AND ADLS

## 2023-08-23 NOTE — CARE COORDINATION
8/23/23, 4:10 PM EDT    DISCHARGE ON GOING [address] day: 5  Location: -15/015-A Reason for admit: Atherosclerosis of native coronary artery, unspecified whether angina present, unspecified whether native or transplanted heart [I25.10]  CAD, multiple vessel [I25.10]   Procedure:   8/18 1v CABG  8/18 Intubated - 8/18 extubated  8/21 Chest tubes and pacer wires removed  8/23 PICC right arm  8/23 CXR: No interval change    Barriers to Discharge: POD #5. Weaned off HFNC. Weaning epi drip. PICC placed today; cordis removed. +BM. Sats 96% on 2L O2. Afebrile. NSR. Ox4. Follows commands. +BM. CR/PT/OT. Dietitian consulted. Dietary ileus prevention protocol. Telemetry, I&O, daily weight, IS, sternal precautions,wound care, SCDs, leon care, ambulate. Epi @ 2 mcg/min, Amio, asa, lipitor, celexa, lovenox, pepcid, IV lasix 20mg bid, SSI, PRN MOM, midodrine 15 mg tid, prn IV morphine, prn IV zofran, prn roxicodone, K+, senokot, senna prn, electrolyte replacement protocols. WBC 12.1, hgb 8.6. PCP: NATASHA Tian NP  Readmission Risk Score: 8.4%  Patient Goals/Plan/Treatment Preferences: Home with JAMES Ravi and [facility] . Monitor for possible walker and 3-in-1 commode at discharge.

## 2023-08-23 NOTE — CARE COORDINATION
8/23/23, 2:06 PM EDT    DISCHARGE PLANNING EVALUATION     [facility] declined referral due to not accepting pts insurance. COMFORT spoke with pt, she is agreeable to [facility] or any [address] [place] [address],[address] agency that accepts her insurance. COMFORT spoke with Sher White at [facility], they do not have the staffing to accept referral.  COMFORT called Jono Jamil declined referral due to pts insurance. COMFORT spoke with Katty at [facility], declined due to pts insurance. COMFORT called [facility], spoke with Johana Pugh, referral accepted and chart faxed so precert could be started.

## 2023-08-24 ENCOUNTER — APPOINTMENT (OUTPATIENT)
Dept: GENERAL RADIOLOGY | Age: 53
DRG: 166 | End: 2023-08-24
Attending: THORACIC SURGERY (CARDIOTHORACIC VASCULAR SURGERY)
Payer: MEDICAID

## 2023-08-24 LAB
ANION GAP SERPL CALC-SCNC: 11 MEQ/L (ref 8–16)
BILIRUB UR QL STRIP: NEGATIVE
BUN SERPL-MCNC: 13 MG/DL (ref 7–22)
CALCIUM SERPL-MCNC: 8.8 MG/DL (ref 8.5–10.5)
CHARACTER UR: CLEAR
CHLORIDE SERPL-SCNC: 100 MEQ/L (ref 98–111)
CO2 SERPL-SCNC: 31 MEQ/L (ref 23–33)
COLOR UR: YELLOW
CREAT SERPL-MCNC: 0.7 MG/DL (ref 0.4–1.2)
DEPRECATED RDW RBC AUTO: 39 FL (ref 35–45)
ERYTHROCYTE [DISTWIDTH] IN BLOOD BY AUTOMATED COUNT: 12.6 % (ref 11.5–14.5)
GFR SERPL CREATININE-BSD FRML MDRD: > 60 ML/MIN/1.73M2
GLUCOSE BLD STRIP.AUTO-MCNC: 106 MG/DL (ref 70–108)
GLUCOSE BLD STRIP.AUTO-MCNC: 144 MG/DL (ref 70–108)
GLUCOSE BLD STRIP.AUTO-MCNC: 73 MG/DL (ref 70–108)
GLUCOSE BLD STRIP.AUTO-MCNC: 99 MG/DL (ref 70–108)
GLUCOSE SERPL-MCNC: 129 MG/DL (ref 70–108)
GLUCOSE UR QL STRIP.AUTO: NEGATIVE MG/DL
HCT VFR BLD AUTO: 27 % (ref 37–47)
HGB BLD-MCNC: 9 GM/DL (ref 12–16)
HGB UR QL STRIP.AUTO: NEGATIVE
KETONES UR QL STRIP.AUTO: NEGATIVE
LEUKOCYTE ESTERASE UR QL STRIP.AUTO: NEGATIVE
LV EF: 58 %
LVEF MODALITY: NORMAL
MCH RBC QN AUTO: 30 PG (ref 26–33)
MCHC RBC AUTO-ENTMCNC: 33.3 GM/DL (ref 32.2–35.5)
MCV RBC AUTO: 90 FL (ref 81–99)
NITRITE UR QL STRIP.AUTO: NEGATIVE
PH UR STRIP.AUTO: 8.5 [PH] (ref 5–9)
PLATELET # BLD AUTO: 272 THOU/MM3 (ref 130–400)
PMV BLD AUTO: 9.4 FL (ref 9.4–12.4)
POTASSIUM SERPL-SCNC: 3.8 MEQ/L (ref 3.5–5.2)
POTASSIUM SERPL-SCNC: 4.1 MEQ/L (ref 3.5–5.2)
POTASSIUM SERPL-SCNC: 4.2 MEQ/L (ref 3.5–5.2)
POTASSIUM SERPL-SCNC: 4.3 MEQ/L (ref 3.5–5.2)
POTASSIUM SERPL-SCNC: 5.2 MEQ/L (ref 3.5–5.2)
PROT UR STRIP.AUTO-MCNC: NEGATIVE MG/DL
RBC # BLD AUTO: 3 MILL/MM3 (ref 4.2–5.4)
SODIUM SERPL-SCNC: 142 MEQ/L (ref 135–145)
SP GR UR REFRACT.AUTO: 1.01 (ref 1–1.03)
UROBILINOGEN UR QL STRIP.AUTO: 1 EU/DL (ref 0–1)
WBC # BLD AUTO: 11.3 THOU/MM3 (ref 4.8–10.8)

## 2023-08-24 PROCEDURE — 6360000002 HC RX W HCPCS: Performed by: THORACIC SURGERY (CARDIOTHORACIC VASCULAR SURGERY)

## 2023-08-24 PROCEDURE — 2000000000 HC ICU R&B

## 2023-08-24 PROCEDURE — 71045 X-RAY EXAM CHEST 1 VIEW: CPT

## 2023-08-24 PROCEDURE — 2700000000 HC OXYGEN THERAPY PER DAY

## 2023-08-24 PROCEDURE — 82948 REAGENT STRIP/BLOOD GLUCOSE: CPT

## 2023-08-24 PROCEDURE — P9041 ALBUMIN (HUMAN),5%, 50ML: HCPCS | Performed by: THORACIC SURGERY (CARDIOTHORACIC VASCULAR SURGERY)

## 2023-08-24 PROCEDURE — 97110 THERAPEUTIC EXERCISES: CPT

## 2023-08-24 PROCEDURE — 2500000003 HC RX 250 WO HCPCS: Performed by: THORACIC SURGERY (CARDIOTHORACIC VASCULAR SURGERY)

## 2023-08-24 PROCEDURE — 80048 BASIC METABOLIC PNL TOTAL CA: CPT

## 2023-08-24 PROCEDURE — 97530 THERAPEUTIC ACTIVITIES: CPT

## 2023-08-24 PROCEDURE — 81003 URINALYSIS AUTO W/O SCOPE: CPT

## 2023-08-24 PROCEDURE — 93306 TTE W/DOPPLER COMPLETE: CPT

## 2023-08-24 PROCEDURE — 84132 ASSAY OF SERUM POTASSIUM: CPT

## 2023-08-24 PROCEDURE — 87086 URINE CULTURE/COLONY COUNT: CPT

## 2023-08-24 PROCEDURE — 2580000003 HC RX 258: Performed by: THORACIC SURGERY (CARDIOTHORACIC VASCULAR SURGERY)

## 2023-08-24 PROCEDURE — APPSS30 APP SPLIT SHARED TIME 16-30 MINUTES: Performed by: PHYSICIAN ASSISTANT

## 2023-08-24 PROCEDURE — 94761 N-INVAS EAR/PLS OXIMETRY MLT: CPT

## 2023-08-24 PROCEDURE — 85027 COMPLETE CBC AUTOMATED: CPT

## 2023-08-24 PROCEDURE — 36415 COLL VENOUS BLD VENIPUNCTURE: CPT

## 2023-08-24 PROCEDURE — 97116 GAIT TRAINING THERAPY: CPT

## 2023-08-24 PROCEDURE — 6370000000 HC RX 637 (ALT 250 FOR IP): Performed by: THORACIC SURGERY (CARDIOTHORACIC VASCULAR SURGERY)

## 2023-08-24 RX ORDER — ALBUMIN, HUMAN INJ 5% 5 %
25 SOLUTION INTRAVENOUS ONCE
Status: COMPLETED | OUTPATIENT
Start: 2023-08-24 | End: 2023-08-24

## 2023-08-24 RX ADMIN — MIDODRINE HYDROCHLORIDE 15 MG: 10 TABLET ORAL at 09:21

## 2023-08-24 RX ADMIN — SODIUM CHLORIDE, PRESERVATIVE FREE 10 ML: 5 INJECTION INTRAVENOUS at 19:50

## 2023-08-24 RX ADMIN — MIDODRINE HYDROCHLORIDE 15 MG: 10 TABLET ORAL at 16:49

## 2023-08-24 RX ADMIN — OXYCODONE HYDROCHLORIDE 5 MG: 5 TABLET ORAL at 04:55

## 2023-08-24 RX ADMIN — SODIUM CHLORIDE, PRESERVATIVE FREE 10 ML: 5 INJECTION INTRAVENOUS at 09:13

## 2023-08-24 RX ADMIN — FAMOTIDINE 20 MG: 20 TABLET, FILM COATED ORAL at 19:49

## 2023-08-24 RX ADMIN — OXYCODONE HYDROCHLORIDE 10 MG: 5 TABLET ORAL at 09:05

## 2023-08-24 RX ADMIN — FAMOTIDINE 20 MG: 20 TABLET, FILM COATED ORAL at 09:06

## 2023-08-24 RX ADMIN — ACETAMINOPHEN 650 MG: 325 TABLET ORAL at 05:41

## 2023-08-24 RX ADMIN — POTASSIUM CHLORIDE 20 MEQ: 29.8 INJECTION, SOLUTION INTRAVENOUS at 08:05

## 2023-08-24 RX ADMIN — ASPIRIN 325 MG: 325 TABLET, COATED ORAL at 09:06

## 2023-08-24 RX ADMIN — ENOXAPARIN SODIUM 30 MG: 100 INJECTION SUBCUTANEOUS at 09:09

## 2023-08-24 RX ADMIN — POTASSIUM CHLORIDE 20 MEQ: 29.8 INJECTION, SOLUTION INTRAVENOUS at 13:51

## 2023-08-24 RX ADMIN — ENOXAPARIN SODIUM 30 MG: 100 INJECTION SUBCUTANEOUS at 19:48

## 2023-08-24 RX ADMIN — Medication 1 MCG/MIN: at 23:27

## 2023-08-24 RX ADMIN — OXYCODONE HYDROCHLORIDE 10 MG: 5 TABLET ORAL at 13:48

## 2023-08-24 RX ADMIN — SENNOSIDES AND DOCUSATE SODIUM 1 TABLET: 50; 8.6 TABLET ORAL at 09:06

## 2023-08-24 RX ADMIN — POTASSIUM CHLORIDE 20 MEQ: 29.8 INJECTION, SOLUTION INTRAVENOUS at 16:55

## 2023-08-24 RX ADMIN — SENNOSIDES AND DOCUSATE SODIUM 1 TABLET: 50; 8.6 TABLET ORAL at 19:49

## 2023-08-24 RX ADMIN — Medication 1 TABLET: at 09:22

## 2023-08-24 RX ADMIN — POTASSIUM CHLORIDE 20 MEQ: 29.8 INJECTION, SOLUTION INTRAVENOUS at 10:54

## 2023-08-24 RX ADMIN — OXYCODONE HYDROCHLORIDE 10 MG: 5 TABLET ORAL at 19:49

## 2023-08-24 RX ADMIN — ATORVASTATIN CALCIUM 20 MG: 20 TABLET, FILM COATED ORAL at 19:49

## 2023-08-24 RX ADMIN — CITALOPRAM HYDROBROMIDE 10 MG: 20 TABLET ORAL at 09:22

## 2023-08-24 RX ADMIN — POTASSIUM CHLORIDE 20 MEQ: 29.8 INJECTION, SOLUTION INTRAVENOUS at 06:56

## 2023-08-24 RX ADMIN — ALBUMIN (HUMAN) 25 G: 12.5 INJECTION, SOLUTION INTRAVENOUS at 09:17

## 2023-08-24 RX ADMIN — MIDODRINE HYDROCHLORIDE 15 MG: 10 TABLET ORAL at 12:50

## 2023-08-24 ASSESSMENT — PAIN DESCRIPTION - PAIN TYPE: TYPE: SURGICAL PAIN

## 2023-08-24 ASSESSMENT — PAIN SCALES - GENERAL
PAINLEVEL_OUTOF10: 7
PAINLEVEL_OUTOF10: 0
PAINLEVEL_OUTOF10: 4
PAINLEVEL_OUTOF10: 7
PAINLEVEL_OUTOF10: 7
PAINLEVEL_OUTOF10: 6
PAINLEVEL_OUTOF10: 4
PAINLEVEL_OUTOF10: 5
PAINLEVEL_OUTOF10: 9
PAINLEVEL_OUTOF10: 4
PAINLEVEL_OUTOF10: 0

## 2023-08-24 ASSESSMENT — PAIN SCALES - WONG BAKER
WONGBAKER_NUMERICALRESPONSE: 0

## 2023-08-24 ASSESSMENT — PAIN DESCRIPTION - LOCATION
LOCATION: CHEST

## 2023-08-24 ASSESSMENT — PAIN - FUNCTIONAL ASSESSMENT: PAIN_FUNCTIONAL_ASSESSMENT: PREVENTS OR INTERFERES SOME ACTIVE ACTIVITIES AND ADLS

## 2023-08-24 ASSESSMENT — PAIN DESCRIPTION - DESCRIPTORS
DESCRIPTORS: ACHING;PRESSURE
DESCRIPTORS: ACHING
DESCRIPTORS: ACHING

## 2023-08-24 ASSESSMENT — PAIN DESCRIPTION - ORIENTATION
ORIENTATION: MID
ORIENTATION: MID

## 2023-08-24 NOTE — CARE COORDINATION
8/24/23, 2:20 PM EDT    [address] day: 6  Location: -15/015-A Reason for admit: Atherosclerosis of native coronary artery, unspecified whether angina present, unspecified whether native or transplanted heart [I25.10]  CAD, multiple vessel [I25.10]   Procedure:   8/18 1v CABG  8/18 Intubated - 8/18 extubated  8/21 Chest tubes and pacer wires removed  8/23 PICC right arm  8/24 CXR: Expected postoperative findings with slight improvement from comparison    Barriers to Discharge: POD #6. Still on epi drip. Large difference in BP between right and left arm. BP cuff on leg reveals /70's with MAP 90's. +BM. Sats 94% on 1L O2. Afebrile. NSR. Ox4. Follows commands. +BM. CR/PT/OT. Dietitian consulted. Dietary ileus prevention protocol. Telemetry, I&O, PICC, daily weight, IS, sternal precautions,wound care, SCDs, leon care, ambulate. Epi @ 1 mcg/min, asa, lipitor, celexa, lovenox, pepcid, SSI, PRN MOM, midodrine 15 mg tid, prn IV morphine, prn IV zofran, prn roxicodone, senokot, senna prn, electrolyte replacement protocols. Received IV albumin x1 today. WBC 11.3, hgb 9. PCP: MARIELENA Nguyen, APRN - NP  Readmission Risk Score: 7.3%  Patient Goals/Plan/Treatment Preferences: Home with S.SYLVIA Langley and [facility]. Monitor for possible walker and 3-in-1 commode at discharge. SW on case.

## 2023-08-25 ENCOUNTER — APPOINTMENT (OUTPATIENT)
Dept: GENERAL RADIOLOGY | Age: 53
DRG: 166 | End: 2023-08-25
Attending: THORACIC SURGERY (CARDIOTHORACIC VASCULAR SURGERY)
Payer: MEDICAID

## 2023-08-25 LAB
ANION GAP SERPL CALC-SCNC: 8 MEQ/L (ref 8–16)
BUN SERPL-MCNC: 17 MG/DL (ref 7–22)
CALCIUM SERPL-MCNC: 9.4 MG/DL (ref 8.5–10.5)
CHLORIDE SERPL-SCNC: 102 MEQ/L (ref 98–111)
CO2 SERPL-SCNC: 29 MEQ/L (ref 23–33)
CREAT SERPL-MCNC: 0.7 MG/DL (ref 0.4–1.2)
DEPRECATED RDW RBC AUTO: 40.8 FL (ref 35–45)
ERYTHROCYTE [DISTWIDTH] IN BLOOD BY AUTOMATED COUNT: 13.2 % (ref 11.5–14.5)
GFR SERPL CREATININE-BSD FRML MDRD: > 60 ML/MIN/1.73M2
GLUCOSE BLD STRIP.AUTO-MCNC: 105 MG/DL (ref 70–108)
GLUCOSE BLD STRIP.AUTO-MCNC: 105 MG/DL (ref 70–108)
GLUCOSE SERPL-MCNC: 103 MG/DL (ref 70–108)
HCT VFR BLD AUTO: 24.9 % (ref 37–47)
HGB BLD-MCNC: 8.1 GM/DL (ref 12–16)
MCH RBC QN AUTO: 30.1 PG (ref 26–33)
MCHC RBC AUTO-ENTMCNC: 32.5 GM/DL (ref 32.2–35.5)
MCV RBC AUTO: 92.6 FL (ref 81–99)
PLATELET # BLD AUTO: 235 THOU/MM3 (ref 130–400)
PMV BLD AUTO: 9.3 FL (ref 9.4–12.4)
POTASSIUM SERPL-SCNC: 4.7 MEQ/L (ref 3.5–5.2)
RBC # BLD AUTO: 2.69 MILL/MM3 (ref 4.2–5.4)
SODIUM SERPL-SCNC: 139 MEQ/L (ref 135–145)
WBC # BLD AUTO: 10.3 THOU/MM3 (ref 4.8–10.8)

## 2023-08-25 PROCEDURE — APPSS30 APP SPLIT SHARED TIME 16-30 MINUTES: Performed by: PHYSICIAN ASSISTANT

## 2023-08-25 PROCEDURE — 85027 COMPLETE CBC AUTOMATED: CPT

## 2023-08-25 PROCEDURE — 97116 GAIT TRAINING THERAPY: CPT

## 2023-08-25 PROCEDURE — 2580000003 HC RX 258: Performed by: THORACIC SURGERY (CARDIOTHORACIC VASCULAR SURGERY)

## 2023-08-25 PROCEDURE — 71045 X-RAY EXAM CHEST 1 VIEW: CPT

## 2023-08-25 PROCEDURE — 6360000002 HC RX W HCPCS: Performed by: THORACIC SURGERY (CARDIOTHORACIC VASCULAR SURGERY)

## 2023-08-25 PROCEDURE — 97530 THERAPEUTIC ACTIVITIES: CPT

## 2023-08-25 PROCEDURE — 97110 THERAPEUTIC EXERCISES: CPT

## 2023-08-25 PROCEDURE — 94761 N-INVAS EAR/PLS OXIMETRY MLT: CPT

## 2023-08-25 PROCEDURE — 36415 COLL VENOUS BLD VENIPUNCTURE: CPT

## 2023-08-25 PROCEDURE — 2700000000 HC OXYGEN THERAPY PER DAY

## 2023-08-25 PROCEDURE — 2060000000 HC ICU INTERMEDIATE R&B

## 2023-08-25 PROCEDURE — 82948 REAGENT STRIP/BLOOD GLUCOSE: CPT

## 2023-08-25 PROCEDURE — 80048 BASIC METABOLIC PNL TOTAL CA: CPT

## 2023-08-25 PROCEDURE — 6370000000 HC RX 637 (ALT 250 FOR IP): Performed by: THORACIC SURGERY (CARDIOTHORACIC VASCULAR SURGERY)

## 2023-08-25 RX ORDER — ATORVASTATIN CALCIUM 40 MG/1
40 TABLET, FILM COATED ORAL NIGHTLY
Status: DISCONTINUED | OUTPATIENT
Start: 2023-08-25 | End: 2023-08-28 | Stop reason: HOSPADM

## 2023-08-25 RX ORDER — FUROSEMIDE 20 MG/1
20 TABLET ORAL DAILY
Status: DISCONTINUED | OUTPATIENT
Start: 2023-08-25 | End: 2023-08-28 | Stop reason: HOSPADM

## 2023-08-25 RX ADMIN — MIDODRINE HYDROCHLORIDE 15 MG: 10 TABLET ORAL at 08:50

## 2023-08-25 RX ADMIN — SODIUM CHLORIDE, PRESERVATIVE FREE 10 ML: 5 INJECTION INTRAVENOUS at 08:50

## 2023-08-25 RX ADMIN — ENOXAPARIN SODIUM 30 MG: 100 INJECTION SUBCUTANEOUS at 08:50

## 2023-08-25 RX ADMIN — FAMOTIDINE 20 MG: 20 TABLET, FILM COATED ORAL at 20:26

## 2023-08-25 RX ADMIN — FAMOTIDINE 20 MG: 20 TABLET, FILM COATED ORAL at 08:50

## 2023-08-25 RX ADMIN — CITALOPRAM HYDROBROMIDE 10 MG: 20 TABLET ORAL at 08:50

## 2023-08-25 RX ADMIN — OXYCODONE HYDROCHLORIDE 10 MG: 5 TABLET ORAL at 16:47

## 2023-08-25 RX ADMIN — OXYCODONE HYDROCHLORIDE 10 MG: 5 TABLET ORAL at 00:54

## 2023-08-25 RX ADMIN — SODIUM CHLORIDE, PRESERVATIVE FREE 10 ML: 5 INJECTION INTRAVENOUS at 20:26

## 2023-08-25 RX ADMIN — SENNOSIDES AND DOCUSATE SODIUM 1 TABLET: 50; 8.6 TABLET ORAL at 08:50

## 2023-08-25 RX ADMIN — Medication 1 TABLET: at 08:50

## 2023-08-25 RX ADMIN — ACETAMINOPHEN 650 MG: 325 TABLET ORAL at 07:07

## 2023-08-25 RX ADMIN — FUROSEMIDE 20 MG: 20 TABLET ORAL at 08:54

## 2023-08-25 RX ADMIN — OXYCODONE HYDROCHLORIDE 10 MG: 5 TABLET ORAL at 23:15

## 2023-08-25 RX ADMIN — MIDODRINE HYDROCHLORIDE 15 MG: 10 TABLET ORAL at 12:43

## 2023-08-25 RX ADMIN — OXYCODONE HYDROCHLORIDE 10 MG: 5 TABLET ORAL at 09:56

## 2023-08-25 RX ADMIN — MIDODRINE HYDROCHLORIDE 15 MG: 10 TABLET ORAL at 17:59

## 2023-08-25 RX ADMIN — ATORVASTATIN CALCIUM 40 MG: 40 TABLET, FILM COATED ORAL at 20:26

## 2023-08-25 RX ADMIN — ASPIRIN 325 MG: 325 TABLET, COATED ORAL at 08:50

## 2023-08-25 ASSESSMENT — PAIN - FUNCTIONAL ASSESSMENT
PAIN_FUNCTIONAL_ASSESSMENT: ACTIVITIES ARE NOT PREVENTED

## 2023-08-25 ASSESSMENT — PAIN SCALES - GENERAL
PAINLEVEL_OUTOF10: 8
PAINLEVEL_OUTOF10: 7
PAINLEVEL_OUTOF10: 7
PAINLEVEL_OUTOF10: 0
PAINLEVEL_OUTOF10: 0
PAINLEVEL_OUTOF10: 8

## 2023-08-25 ASSESSMENT — PAIN DESCRIPTION - FREQUENCY
FREQUENCY: CONTINUOUS
FREQUENCY: CONTINUOUS

## 2023-08-25 ASSESSMENT — PAIN DESCRIPTION - LOCATION
LOCATION: CHEST
LOCATION: INCISION
LOCATION: CHEST
LOCATION: CHEST

## 2023-08-25 ASSESSMENT — PAIN DESCRIPTION - ORIENTATION
ORIENTATION: MID

## 2023-08-25 ASSESSMENT — PAIN DESCRIPTION - DESCRIPTORS
DESCRIPTORS: ACHING
DESCRIPTORS: ACHING
DESCRIPTORS: DISCOMFORT;ACHING
DESCRIPTORS: ACHING

## 2023-08-25 ASSESSMENT — PAIN SCALES - WONG BAKER: WONGBAKER_NUMERICALRESPONSE: 0

## 2023-08-25 ASSESSMENT — PAIN DESCRIPTION - PAIN TYPE
TYPE: SURGICAL PAIN
TYPE: SURGICAL PAIN

## 2023-08-25 ASSESSMENT — PAIN DESCRIPTION - ONSET
ONSET: ON-GOING
ONSET: ON-GOING

## 2023-08-25 ASSESSMENT — PAIN DESCRIPTION - DIRECTION: RADIATING_TOWARDS: INTO NECK

## 2023-08-25 NOTE — CARE COORDINATION
8/25/23, 10:45 AM EDT    DISCHARGE PLANNING EVALUATION     Pt transferred to  21, Stacy CM states therapy reports pt is walking in hallway without difficulty. COMFORT called [facility] for update and informed them of potential discharge over the weekend. COMFORT placed [facility] instruction sheet for nurse to follow if pt is discharge Saturday or Sunday.

## 2023-08-25 NOTE — FLOWSHEET NOTE
08/25/23 1010   Safe Environment   Safety Measures Bed in low position;Call light within reach;Standard Safety Measures  (virtual safety round complete/admission)     VN completed remaining admission questions at this time. Pt resting in bed. No apparent signs of distress. Virtual services explained and pt agreed.

## 2023-08-25 NOTE — CARE COORDINATION
8/25/23, 3:12 PM EDT    DISCHARGE ON GOING [address] day: 7  Location: -21/021-A Reason for admit: Atherosclerosis of native coronary artery, unspecified whether angina present, unspecified whether native or transplanted heart [I25.10]  CAD, multiple vessel [I25.10]   Procedure:   8/18 1v CABG  8/18 Intubated - 8/18 extubated  8/21 Chest tubes and pacer wires removed  8/23 PICC right arm  8/25 CXR: Postoperative chest with mild fluid overload/CHF    Barriers to Discharge: POD #7. Epi weaned off. Transferred to stepdown today. +BM. On room air. Afebrile. NSR. Ox4. Follows commands. +BM. CR/PT/OT. Dietitian consulted. Dietary ileus prevention protocol. Telemetry, I&O, PICC, daily weight, IS, sternal precautions,wound care, SCDs, ambulate. Asa, lipitor, pepcid, po lasix 20 mg daily, midodrine 15 mg tid, prn roxicodone, senokot, senna prn, electrolyte replacement protocols. Hgb 8.1. PCP: MARIELENA Mac, APRN - NP  Readmission Risk Score: 8.1%  Patient Goals/Plan/Treatment Preferences: Home with S.O. Emelina Tobar and [facility]. Tarri Loser and 3-in-1 commode ordered. SW on case.

## 2023-08-25 NOTE — CARE COORDINATION
Raffy Sheldon requires a bedside commode due to being confined to one level of the home, and is physically incapable of utilizing regular toilet facilities. Current body weight is Weight - Scale: 181 lb 7 oz (82.3 kg).

## 2023-08-26 ENCOUNTER — APPOINTMENT (OUTPATIENT)
Dept: GENERAL RADIOLOGY | Age: 53
DRG: 166 | End: 2023-08-26
Attending: THORACIC SURGERY (CARDIOTHORACIC VASCULAR SURGERY)
Payer: MEDICAID

## 2023-08-26 LAB
ANION GAP SERPL CALC-SCNC: 12 MEQ/L (ref 8–16)
BACTERIA UR CULT: NORMAL
BUN SERPL-MCNC: 19 MG/DL (ref 7–22)
CALCIUM SERPL-MCNC: 8.9 MG/DL (ref 8.5–10.5)
CHLORIDE SERPL-SCNC: 101 MEQ/L (ref 98–111)
CO2 SERPL-SCNC: 27 MEQ/L (ref 23–33)
CREAT SERPL-MCNC: 0.8 MG/DL (ref 0.4–1.2)
DEPRECATED RDW RBC AUTO: 41.9 FL (ref 35–45)
EKG ATRIAL RATE: 82 BPM
EKG ATRIAL RATE: 94 BPM
EKG ATRIAL RATE: 99 BPM
EKG P AXIS: 31 DEGREES
EKG P AXIS: 46 DEGREES
EKG P AXIS: 52 DEGREES
EKG P-R INTERVAL: 136 MS
EKG P-R INTERVAL: 142 MS
EKG P-R INTERVAL: 146 MS
EKG Q-T INTERVAL: 304 MS
EKG Q-T INTERVAL: 380 MS
EKG Q-T INTERVAL: 382 MS
EKG QRS DURATION: 72 MS
EKG QRS DURATION: 74 MS
EKG QRS DURATION: 86 MS
EKG QTC CALCULATION (BAZETT): 380 MS
EKG QTC CALCULATION (BAZETT): 446 MS
EKG QTC CALCULATION (BAZETT): 487 MS
EKG R AXIS: 17 DEGREES
EKG R AXIS: 19 DEGREES
EKG R AXIS: 53 DEGREES
EKG T AXIS: 21 DEGREES
EKG T AXIS: 41 DEGREES
EKG T AXIS: 88 DEGREES
EKG VENTRICULAR RATE: 82 BPM
EKG VENTRICULAR RATE: 94 BPM
EKG VENTRICULAR RATE: 99 BPM
ERYTHROCYTE [DISTWIDTH] IN BLOOD BY AUTOMATED COUNT: 13.9 % (ref 11.5–14.5)
GFR SERPL CREATININE-BSD FRML MDRD: > 60 ML/MIN/1.73M2
GLUCOSE SERPL-MCNC: 86 MG/DL (ref 70–108)
HCT VFR BLD AUTO: 24.9 % (ref 37–47)
HGB BLD-MCNC: 7.9 GM/DL (ref 12–16)
MCH RBC QN AUTO: 29.8 PG (ref 26–33)
MCHC RBC AUTO-ENTMCNC: 31.7 GM/DL (ref 32.2–35.5)
MCV RBC AUTO: 94 FL (ref 81–99)
PLATELET # BLD AUTO: 232 THOU/MM3 (ref 130–400)
PMV BLD AUTO: 9.6 FL (ref 9.4–12.4)
POTASSIUM SERPL-SCNC: 4.2 MEQ/L (ref 3.5–5.2)
RBC # BLD AUTO: 2.65 MILL/MM3 (ref 4.2–5.4)
SODIUM SERPL-SCNC: 140 MEQ/L (ref 135–145)
WBC # BLD AUTO: 7.3 THOU/MM3 (ref 4.8–10.8)

## 2023-08-26 PROCEDURE — 85027 COMPLETE CBC AUTOMATED: CPT

## 2023-08-26 PROCEDURE — 80048 BASIC METABOLIC PNL TOTAL CA: CPT

## 2023-08-26 PROCEDURE — 36415 COLL VENOUS BLD VENIPUNCTURE: CPT

## 2023-08-26 PROCEDURE — 97116 GAIT TRAINING THERAPY: CPT

## 2023-08-26 PROCEDURE — 2580000003 HC RX 258: Performed by: THORACIC SURGERY (CARDIOTHORACIC VASCULAR SURGERY)

## 2023-08-26 PROCEDURE — 2060000000 HC ICU INTERMEDIATE R&B

## 2023-08-26 PROCEDURE — 6370000000 HC RX 637 (ALT 250 FOR IP): Performed by: THORACIC SURGERY (CARDIOTHORACIC VASCULAR SURGERY)

## 2023-08-26 PROCEDURE — 71045 X-RAY EXAM CHEST 1 VIEW: CPT

## 2023-08-26 RX ORDER — MIDODRINE HYDROCHLORIDE 10 MG/1
10 TABLET ORAL
Status: DISCONTINUED | OUTPATIENT
Start: 2023-08-26 | End: 2023-08-27

## 2023-08-26 RX ADMIN — ASPIRIN 325 MG: 325 TABLET, COATED ORAL at 07:48

## 2023-08-26 RX ADMIN — FAMOTIDINE 20 MG: 20 TABLET, FILM COATED ORAL at 07:48

## 2023-08-26 RX ADMIN — FAMOTIDINE 20 MG: 20 TABLET, FILM COATED ORAL at 20:53

## 2023-08-26 RX ADMIN — MIDODRINE HYDROCHLORIDE 10 MG: 10 TABLET ORAL at 10:05

## 2023-08-26 RX ADMIN — Medication 1 TABLET: at 10:05

## 2023-08-26 RX ADMIN — SODIUM CHLORIDE, PRESERVATIVE FREE 10 ML: 5 INJECTION INTRAVENOUS at 07:48

## 2023-08-26 RX ADMIN — SENNOSIDES AND DOCUSATE SODIUM 1 TABLET: 50; 8.6 TABLET ORAL at 07:48

## 2023-08-26 RX ADMIN — SENNOSIDES AND DOCUSATE SODIUM 1 TABLET: 50; 8.6 TABLET ORAL at 20:53

## 2023-08-26 RX ADMIN — MIDODRINE HYDROCHLORIDE 10 MG: 10 TABLET ORAL at 16:57

## 2023-08-26 RX ADMIN — FUROSEMIDE 20 MG: 20 TABLET ORAL at 10:05

## 2023-08-26 RX ADMIN — OXYCODONE HYDROCHLORIDE 10 MG: 5 TABLET ORAL at 20:53

## 2023-08-26 RX ADMIN — OXYCODONE HYDROCHLORIDE 5 MG: 5 TABLET ORAL at 05:28

## 2023-08-26 RX ADMIN — SODIUM CHLORIDE, PRESERVATIVE FREE 10 ML: 5 INJECTION INTRAVENOUS at 20:55

## 2023-08-26 RX ADMIN — OXYCODONE HYDROCHLORIDE 10 MG: 5 TABLET ORAL at 10:05

## 2023-08-26 ASSESSMENT — PAIN - FUNCTIONAL ASSESSMENT
PAIN_FUNCTIONAL_ASSESSMENT: ACTIVITIES ARE NOT PREVENTED

## 2023-08-26 ASSESSMENT — PAIN DESCRIPTION - ORIENTATION
ORIENTATION: MID

## 2023-08-26 ASSESSMENT — PAIN DESCRIPTION - DESCRIPTORS
DESCRIPTORS: ACHING
DESCRIPTORS: DISCOMFORT
DESCRIPTORS: ACHING

## 2023-08-26 ASSESSMENT — PAIN DESCRIPTION - LOCATION
LOCATION: CHEST

## 2023-08-26 ASSESSMENT — PAIN SCALES - GENERAL
PAINLEVEL_OUTOF10: 0
PAINLEVEL_OUTOF10: 0
PAINLEVEL_OUTOF10: 2
PAINLEVEL_OUTOF10: 0
PAINLEVEL_OUTOF10: 5
PAINLEVEL_OUTOF10: 2
PAINLEVEL_OUTOF10: 1
PAINLEVEL_OUTOF10: 0
PAINLEVEL_OUTOF10: 7
PAINLEVEL_OUTOF10: 8

## 2023-08-26 ASSESSMENT — PAIN DESCRIPTION - FREQUENCY
FREQUENCY: CONTINUOUS
FREQUENCY: CONTINUOUS

## 2023-08-26 ASSESSMENT — PAIN DESCRIPTION - ONSET
ONSET: ON-GOING
ONSET: ON-GOING

## 2023-08-26 ASSESSMENT — PAIN DESCRIPTION - PAIN TYPE
TYPE: SURGICAL PAIN
TYPE: SURGICAL PAIN

## 2023-08-27 ENCOUNTER — APPOINTMENT (OUTPATIENT)
Dept: GENERAL RADIOLOGY | Age: 53
DRG: 166 | End: 2023-08-27
Attending: THORACIC SURGERY (CARDIOTHORACIC VASCULAR SURGERY)
Payer: MEDICAID

## 2023-08-27 LAB
ANION GAP SERPL CALC-SCNC: 14 MEQ/L (ref 8–16)
BUN SERPL-MCNC: 15 MG/DL (ref 7–22)
CALCIUM SERPL-MCNC: 9.1 MG/DL (ref 8.5–10.5)
CHLORIDE SERPL-SCNC: 103 MEQ/L (ref 98–111)
CO2 SERPL-SCNC: 26 MEQ/L (ref 23–33)
CREAT SERPL-MCNC: 0.9 MG/DL (ref 0.4–1.2)
DEPRECATED RDW RBC AUTO: 43.4 FL (ref 35–45)
ERYTHROCYTE [DISTWIDTH] IN BLOOD BY AUTOMATED COUNT: 14.6 % (ref 11.5–14.5)
GFR SERPL CREATININE-BSD FRML MDRD: > 60 ML/MIN/1.73M2
GLUCOSE SERPL-MCNC: 88 MG/DL (ref 70–108)
HCT VFR BLD AUTO: 24.5 % (ref 37–47)
HGB BLD-MCNC: 7.9 GM/DL (ref 12–16)
MCH RBC QN AUTO: 30 PG (ref 26–33)
MCHC RBC AUTO-ENTMCNC: 32.2 GM/DL (ref 32.2–35.5)
MCV RBC AUTO: 93.2 FL (ref 81–99)
PLATELET # BLD AUTO: 227 THOU/MM3 (ref 130–400)
PMV BLD AUTO: 9.7 FL (ref 9.4–12.4)
POTASSIUM SERPL-SCNC: 3.8 MEQ/L (ref 3.5–5.2)
RBC # BLD AUTO: 2.63 MILL/MM3 (ref 4.2–5.4)
SODIUM SERPL-SCNC: 143 MEQ/L (ref 135–145)
WBC # BLD AUTO: 6.8 THOU/MM3 (ref 4.8–10.8)

## 2023-08-27 PROCEDURE — 85027 COMPLETE CBC AUTOMATED: CPT

## 2023-08-27 PROCEDURE — 97535 SELF CARE MNGMENT TRAINING: CPT

## 2023-08-27 PROCEDURE — 6370000000 HC RX 637 (ALT 250 FOR IP): Performed by: THORACIC SURGERY (CARDIOTHORACIC VASCULAR SURGERY)

## 2023-08-27 PROCEDURE — 97530 THERAPEUTIC ACTIVITIES: CPT

## 2023-08-27 PROCEDURE — 36415 COLL VENOUS BLD VENIPUNCTURE: CPT

## 2023-08-27 PROCEDURE — 71045 X-RAY EXAM CHEST 1 VIEW: CPT

## 2023-08-27 PROCEDURE — 80048 BASIC METABOLIC PNL TOTAL CA: CPT

## 2023-08-27 PROCEDURE — 2060000000 HC ICU INTERMEDIATE R&B

## 2023-08-27 PROCEDURE — 2580000003 HC RX 258: Performed by: THORACIC SURGERY (CARDIOTHORACIC VASCULAR SURGERY)

## 2023-08-27 RX ORDER — MIDODRINE HYDROCHLORIDE 5 MG/1
5 TABLET ORAL
Status: DISCONTINUED | OUTPATIENT
Start: 2023-08-27 | End: 2023-08-28

## 2023-08-27 RX ORDER — POTASSIUM CHLORIDE 20 MEQ/1
10 TABLET, EXTENDED RELEASE ORAL
Status: DISCONTINUED | OUTPATIENT
Start: 2023-08-27 | End: 2023-08-28 | Stop reason: HOSPADM

## 2023-08-27 RX ADMIN — FAMOTIDINE 20 MG: 20 TABLET, FILM COATED ORAL at 21:09

## 2023-08-27 RX ADMIN — FUROSEMIDE 20 MG: 20 TABLET ORAL at 08:58

## 2023-08-27 RX ADMIN — MIDODRINE HYDROCHLORIDE 10 MG: 10 TABLET ORAL at 08:59

## 2023-08-27 RX ADMIN — SENNOSIDES AND DOCUSATE SODIUM 1 TABLET: 50; 8.6 TABLET ORAL at 08:57

## 2023-08-27 RX ADMIN — ATORVASTATIN CALCIUM 40 MG: 40 TABLET, FILM COATED ORAL at 21:09

## 2023-08-27 RX ADMIN — MIDODRINE HYDROCHLORIDE 5 MG: 10 TABLET ORAL at 15:59

## 2023-08-27 RX ADMIN — POTASSIUM CHLORIDE 10 MEQ: 1500 TABLET, EXTENDED RELEASE ORAL at 08:57

## 2023-08-27 RX ADMIN — SENNOSIDES AND DOCUSATE SODIUM 1 TABLET: 50; 8.6 TABLET ORAL at 21:10

## 2023-08-27 RX ADMIN — MIDODRINE HYDROCHLORIDE 5 MG: 10 TABLET ORAL at 11:48

## 2023-08-27 RX ADMIN — OXYCODONE HYDROCHLORIDE 10 MG: 5 TABLET ORAL at 08:57

## 2023-08-27 RX ADMIN — OXYCODONE HYDROCHLORIDE 10 MG: 5 TABLET ORAL at 18:04

## 2023-08-27 RX ADMIN — SODIUM CHLORIDE, PRESERVATIVE FREE 10 ML: 5 INJECTION INTRAVENOUS at 21:11

## 2023-08-27 RX ADMIN — ATORVASTATIN CALCIUM 40 MG: 40 TABLET, FILM COATED ORAL at 00:45

## 2023-08-27 RX ADMIN — FAMOTIDINE 20 MG: 20 TABLET, FILM COATED ORAL at 08:57

## 2023-08-27 RX ADMIN — ASPIRIN 325 MG: 325 TABLET, COATED ORAL at 08:57

## 2023-08-27 RX ADMIN — Medication 1 TABLET: at 08:58

## 2023-08-27 RX ADMIN — SODIUM CHLORIDE, PRESERVATIVE FREE 10 ML: 5 INJECTION INTRAVENOUS at 08:59

## 2023-08-27 RX ADMIN — OXYCODONE HYDROCHLORIDE 5 MG: 5 TABLET ORAL at 00:45

## 2023-08-27 ASSESSMENT — PAIN DESCRIPTION - DESCRIPTORS
DESCRIPTORS: ACHING
DESCRIPTORS: ACHING;SORE

## 2023-08-27 ASSESSMENT — PAIN SCALES - GENERAL
PAINLEVEL_OUTOF10: 4
PAINLEVEL_OUTOF10: 3
PAINLEVEL_OUTOF10: 0
PAINLEVEL_OUTOF10: 4
PAINLEVEL_OUTOF10: 8
PAINLEVEL_OUTOF10: 7

## 2023-08-27 ASSESSMENT — PAIN - FUNCTIONAL ASSESSMENT
PAIN_FUNCTIONAL_ASSESSMENT: ACTIVITIES ARE NOT PREVENTED
PAIN_FUNCTIONAL_ASSESSMENT: ACTIVITIES ARE NOT PREVENTED

## 2023-08-27 ASSESSMENT — PAIN DESCRIPTION - PAIN TYPE
TYPE: SURGICAL PAIN
TYPE: SURGICAL PAIN

## 2023-08-27 ASSESSMENT — PAIN DESCRIPTION - ONSET
ONSET: ON-GOING
ONSET: ON-GOING

## 2023-08-27 ASSESSMENT — PAIN DESCRIPTION - ORIENTATION
ORIENTATION: MID;INNER
ORIENTATION: MID

## 2023-08-27 ASSESSMENT — PAIN DESCRIPTION - FREQUENCY
FREQUENCY: CONTINUOUS
FREQUENCY: CONTINUOUS

## 2023-08-27 ASSESSMENT — PAIN DESCRIPTION - LOCATION
LOCATION: INCISION;CHEST
LOCATION: INCISION

## 2023-08-28 ENCOUNTER — APPOINTMENT (OUTPATIENT)
Dept: GENERAL RADIOLOGY | Age: 53
DRG: 166 | End: 2023-08-28
Attending: THORACIC SURGERY (CARDIOTHORACIC VASCULAR SURGERY)
Payer: MEDICAID

## 2023-08-28 VITALS
WEIGHT: 181.44 LBS | HEART RATE: 68 BPM | SYSTOLIC BLOOD PRESSURE: 103 MMHG | HEIGHT: 59 IN | TEMPERATURE: 98.6 F | BODY MASS INDEX: 36.58 KG/M2 | DIASTOLIC BLOOD PRESSURE: 49 MMHG | OXYGEN SATURATION: 91 % | RESPIRATION RATE: 16 BRPM

## 2023-08-28 LAB
ANION GAP SERPL CALC-SCNC: 11 MEQ/L (ref 8–16)
BUN SERPL-MCNC: 13 MG/DL (ref 7–22)
CALCIUM SERPL-MCNC: 9.1 MG/DL (ref 8.5–10.5)
CHLORIDE SERPL-SCNC: 107 MEQ/L (ref 98–111)
CO2 SERPL-SCNC: 26 MEQ/L (ref 23–33)
CREAT SERPL-MCNC: 0.9 MG/DL (ref 0.4–1.2)
DEPRECATED RDW RBC AUTO: 45.3 FL (ref 35–45)
ERYTHROCYTE [DISTWIDTH] IN BLOOD BY AUTOMATED COUNT: 15.2 % (ref 11.5–14.5)
GFR SERPL CREATININE-BSD FRML MDRD: > 60 ML/MIN/1.73M2
GLUCOSE SERPL-MCNC: 95 MG/DL (ref 70–108)
HCT VFR BLD AUTO: 26.3 % (ref 37–47)
HGB BLD-MCNC: 8.3 GM/DL (ref 12–16)
MCH RBC QN AUTO: 30 PG (ref 26–33)
MCHC RBC AUTO-ENTMCNC: 31.6 GM/DL (ref 32.2–35.5)
MCV RBC AUTO: 94.9 FL (ref 81–99)
PLATELET # BLD AUTO: 283 THOU/MM3 (ref 130–400)
PMV BLD AUTO: 9.6 FL (ref 9.4–12.4)
POTASSIUM SERPL-SCNC: 4 MEQ/L (ref 3.5–5.2)
RBC # BLD AUTO: 2.77 MILL/MM3 (ref 4.2–5.4)
SODIUM SERPL-SCNC: 144 MEQ/L (ref 135–145)
WBC # BLD AUTO: 6.7 THOU/MM3 (ref 4.8–10.8)

## 2023-08-28 PROCEDURE — 6370000000 HC RX 637 (ALT 250 FOR IP): Performed by: THORACIC SURGERY (CARDIOTHORACIC VASCULAR SURGERY)

## 2023-08-28 PROCEDURE — 85027 COMPLETE CBC AUTOMATED: CPT

## 2023-08-28 PROCEDURE — 2580000003 HC RX 258: Performed by: THORACIC SURGERY (CARDIOTHORACIC VASCULAR SURGERY)

## 2023-08-28 PROCEDURE — 71045 X-RAY EXAM CHEST 1 VIEW: CPT

## 2023-08-28 PROCEDURE — 36415 COLL VENOUS BLD VENIPUNCTURE: CPT

## 2023-08-28 PROCEDURE — 97535 SELF CARE MNGMENT TRAINING: CPT

## 2023-08-28 PROCEDURE — 97530 THERAPEUTIC ACTIVITIES: CPT

## 2023-08-28 PROCEDURE — 97116 GAIT TRAINING THERAPY: CPT

## 2023-08-28 PROCEDURE — APPSS60 APP SPLIT SHARED TIME 46-60 MINUTES: Performed by: PHYSICIAN ASSISTANT

## 2023-08-28 PROCEDURE — 97110 THERAPEUTIC EXERCISES: CPT

## 2023-08-28 PROCEDURE — 80048 BASIC METABOLIC PNL TOTAL CA: CPT

## 2023-08-28 RX ORDER — OXYCODONE HYDROCHLORIDE 5 MG/1
5 TABLET ORAL EVERY 8 HOURS PRN
Qty: 21 TABLET | Refills: 0 | Status: SHIPPED | OUTPATIENT
Start: 2023-08-28 | End: 2023-09-04

## 2023-08-28 RX ORDER — POTASSIUM CHLORIDE 750 MG/1
10 TABLET, EXTENDED RELEASE ORAL
Qty: 14 TABLET | Refills: 0 | Status: SHIPPED | OUTPATIENT
Start: 2023-08-29

## 2023-08-28 RX ORDER — FUROSEMIDE 20 MG/1
20 TABLET ORAL DAILY
Qty: 14 TABLET | Refills: 0 | Status: SHIPPED | OUTPATIENT
Start: 2023-08-29

## 2023-08-28 RX ORDER — MULTIVITAMIN WITH IRON
1 TABLET ORAL
Qty: 30 TABLET | Refills: 0 | Status: SHIPPED | OUTPATIENT
Start: 2023-08-29

## 2023-08-28 RX ORDER — MIDODRINE HYDROCHLORIDE 5 MG/1
5 TABLET ORAL
Qty: 90 TABLET | Refills: 1 | Status: SHIPPED | OUTPATIENT
Start: 2023-08-28 | End: 2023-08-28 | Stop reason: HOSPADM

## 2023-08-28 RX ORDER — ASPIRIN 325 MG
325 TABLET, DELAYED RELEASE (ENTERIC COATED) ORAL DAILY
Qty: 30 TABLET | Refills: 3 | Status: SHIPPED | OUTPATIENT
Start: 2023-08-29

## 2023-08-28 RX ADMIN — ACETAMINOPHEN 650 MG: 325 TABLET ORAL at 08:10

## 2023-08-28 RX ADMIN — SENNOSIDES AND DOCUSATE SODIUM 1 TABLET: 50; 8.6 TABLET ORAL at 08:10

## 2023-08-28 RX ADMIN — MIDODRINE HYDROCHLORIDE 5 MG: 10 TABLET ORAL at 08:10

## 2023-08-28 RX ADMIN — FAMOTIDINE 20 MG: 20 TABLET, FILM COATED ORAL at 08:10

## 2023-08-28 RX ADMIN — FUROSEMIDE 20 MG: 20 TABLET ORAL at 08:10

## 2023-08-28 RX ADMIN — POTASSIUM CHLORIDE 10 MEQ: 1500 TABLET, EXTENDED RELEASE ORAL at 08:10

## 2023-08-28 RX ADMIN — Medication 1 TABLET: at 08:10

## 2023-08-28 RX ADMIN — OXYCODONE HYDROCHLORIDE 5 MG: 5 TABLET ORAL at 05:28

## 2023-08-28 RX ADMIN — SODIUM CHLORIDE, PRESERVATIVE FREE 10 ML: 5 INJECTION INTRAVENOUS at 08:10

## 2023-08-28 RX ADMIN — ASPIRIN 325 MG: 325 TABLET, COATED ORAL at 08:10

## 2023-08-28 ASSESSMENT — PAIN DESCRIPTION - LOCATION
LOCATION: CHEST
LOCATION: CHEST

## 2023-08-28 ASSESSMENT — PAIN DESCRIPTION - PAIN TYPE: TYPE: SURGICAL PAIN

## 2023-08-28 ASSESSMENT — PAIN SCALES - GENERAL
PAINLEVEL_OUTOF10: 5
PAINLEVEL_OUTOF10: 0
PAINLEVEL_OUTOF10: 6

## 2023-08-28 ASSESSMENT — PAIN DESCRIPTION - ORIENTATION: ORIENTATION: MID

## 2023-08-28 ASSESSMENT — PAIN DESCRIPTION - DESCRIPTORS: DESCRIPTORS: ACHING

## 2023-08-28 ASSESSMENT — PAIN DESCRIPTION - ONSET: ONSET: ON-GOING

## 2023-08-28 ASSESSMENT — PAIN DESCRIPTION - FREQUENCY: FREQUENCY: INTERMITTENT

## 2023-08-28 ASSESSMENT — PAIN - FUNCTIONAL ASSESSMENT: PAIN_FUNCTIONAL_ASSESSMENT: ACTIVITIES ARE NOT PREVENTED

## 2023-08-28 NOTE — DISCHARGE INSTRUCTIONS
[facility], [address]   ? You may call the office to make an appointment, if you don't have an appointment. ([phone]). ? You will need to have a chest xray and labs completed 3-7 days before your follow up appointment. ?    Bring any questions you have so they may be addressed. ? You should have a follow up appointment with your primary care doctor 7-10 days from discharge, please call and make one if you do not have one.   ?   You should have a  follow up appointment with your Cardiologist 5-6 weeks from discharge, please call and make one if you do not have one.   ?   Cardiac Rehab will set up a follow up time for you to begin your rehabilitation program.         EMERGENCIES   ? You should either call 911 or go to the Emergency Room to be evaluated if you need seen immediately. ?  Sudden, severe shortness of breath go to the Emergency Room. If you have questions regarding these instructions, please call our office  [phone]. We have an answering service 24 hours a day to get your calls to the ON Call Physician, but we are often in surgery and it takes us awhile to get back to you.

## 2023-08-28 NOTE — OR NURSING
Discharge teaching and instructions for diagnosis/procedure of CAD/CABG completed with patient using teachback method. AVS reviewed. Prescriptions given to patient. Patient voiced understanding regarding prescriptions, follow up appointments, and care of self at home. Discharged in a wheelchair to  home with support per family. AVS/MAR faxed to [facility]. Bedside commode and walker given to patient prior to discharge.

## 2023-08-28 NOTE — CARE COORDINATION
8/28/23, 2:41 PM EDT    Patient goals/plan/ treatment preferences discussed by  and . Patient goals/plan/ treatment preferences reviewed with patient/ family. Patient/ family verbalize understanding of discharge plan and are in agreement with goal/plan/treatment preferences. Understanding was demonstrated using the teach back method. AVS provided by RN at time of discharge, which includes all necessary medical information pertaining to the patients current course of illness, treatment, post-discharge goals of care, and treatment preferences. Services At/After Discharge: [facility] today, COMFORT Chávez arranged for [employer] to provide transportation. COMFORT notified [facility] and faxed discharge summary.

## 2023-08-28 NOTE — PLAN OF CARE
Problem: Discharge Planning  Goal: Discharge to home or other facility with appropriate resources  8/22/2023 0200 by Consuelo Zaldivar RN  Outcome: Progressing  Flowsheets (Taken 8/21/2023 0800 by Giovany Vallejo RN)  Discharge to home or other facility with appropriate resources:   Identify barriers to discharge with patient and caregiver   Arrange for needed discharge resources and transportation as appropriate   Identify discharge learning needs (meds, wound care, etc)   Arrange for interpreters to assist at discharge as needed   Refer to discharge planning if patient needs post-hospital services based on physician order or complex needs related to functional status, cognitive ability or social support system     Problem: Pain  Goal: Verbalizes/displays adequate comfort level or baseline comfort level  8/22/2023 0200 by Consuelo Zaldivar RN  Outcome: Progressing  Flowsheets (Taken 8/19/2023 0929 by Mana Sims RN)  Verbalizes/displays adequate comfort level or baseline comfort level:   Encourage patient to monitor pain and request assistance   Assess pain using appropriate pain scale   Implement non-pharmacological measures as appropriate and evaluate response   Administer analgesics based on type and severity of pain and evaluate response   Consider cultural and social influences on pain and pain management   Notify Licensed Independent Practitioner if interventions unsuccessful or patient reports new pain     Problem: Safety - Adult  Goal: Free from fall injury  8/22/2023 0200 by Consuelo Zaldivar RN  Outcome: Progressing  [address] (Taken 8/19/2023 0929 by Mana Sims, RN)  Free From Fall Injury:   Instruct family/caregiver on patient safety   Based on caregiver fall risk screen, instruct family/caregiver to ask for assistance with transferring infant if caregiver noted to have fall risk factors     Problem: Respiratory - Adult  Goal: Achieves optimal ventilation and oxygenation  Outcome:
Problem: Discharge Planning  Goal: Discharge to home or other facility with appropriate resources  8/23/2023 0124 by Lear Mcardle, RN  Outcome: Progressing  Flowsheets (Taken 8/22/2023 0815 by Malia Ng RN)  Discharge to home or other facility with appropriate resources:   Identify barriers to discharge with patient and caregiver   Arrange for needed discharge resources and transportation as appropriate   Identify discharge learning needs (meds, wound care, etc)     Problem: Pain  Goal: Verbalizes/displays adequate comfort level or baseline comfort level  8/23/2023 0124 by Lear Mcardle, RN  Outcome: Progressing  Flowsheets (Taken 8/19/2023 0929 by Malika Griffin RN)  Verbalizes/displays adequate comfort level or baseline comfort level:   Encourage patient to monitor pain and request assistance   Assess pain using appropriate pain scale   Implement non-pharmacological measures as appropriate and evaluate response   Administer analgesics based on type and severity of pain and evaluate response   Consider cultural and social influences on pain and pain management   Notify Licensed Independent Practitioner if interventions unsuccessful or patient reports new pain     Problem: Safety - Adult  Goal: Free from fall injury  8/23/2023 0124 by Lear Mcardle, RN  Outcome: Progressing  [address] (Taken 8/19/2023 0929 by Malika Griffin RN)  Free From Fall Injury:   Instruct family/caregiver on patient safety   Based on caregiver fall risk screen, instruct family/caregiver to ask for assistance with transferring infant if caregiver noted to have fall risk factors     Problem: Respiratory - Adult  Goal: Achieves optimal ventilation and oxygenation  8/23/2023 0124 by Lear Mcardle, RN  Outcome: Progressing  Flowsheets (Taken 8/22/2023 0200)  Achieves optimal ventilation and oxygenation:   Assess for changes in respiratory status   Assess for changes in mentation and behavior   Position to facilitate
Problem: Discharge Planning  Goal: Discharge to home or other facility with appropriate resources  8/23/2023 1028 by Trisha Harry RN  Outcome: Progressing  Flowsheets (Taken 8/23/2023 1028)  Discharge to home or other facility with appropriate resources:   Identify barriers to discharge with patient and caregiver   Arrange for needed discharge resources and transportation as appropriate   Identify discharge learning needs (meds, wound care, etc)     Problem: Pain  Goal: Verbalizes/displays adequate comfort level or baseline comfort level  8/23/2023 1028 by Trisha Harry RN  Outcome: Progressing  Flowsheets (Taken 8/23/2023 1028)  Verbalizes/displays adequate comfort level or baseline comfort level:   Encourage patient to monitor pain and request assistance   Assess pain using appropriate pain scale   Administer analgesics based on type and severity of pain and evaluate response   Implement non-pharmacological measures as appropriate and evaluate response     Problem: Safety - Adult  Goal: Free from fall injury  8/23/2023 1028 by Trisha Harry RN  Outcome: Progressing     Problem: Respiratory - Adult  Goal: Achieves optimal ventilation and oxygenation  8/23/2023 1028 by Trisha Harry RN  Outcome: Progressing  Flowsheets (Taken 8/23/2023 1028)  Achieves optimal ventilation and oxygenation:   Assess for changes in respiratory status   Assess for changes in mentation and behavior   Oxygen supplementation based on oxygen saturation or arterial blood gases   Position to facilitate oxygenation and minimize respiratory effort
Problem: Discharge Planning  Goal: Discharge to home or other facility with appropriate resources  8/25/2023 1407 by Bob Villaseñor RN  Outcome: Progressing  Flowsheets (Taken 8/25/2023 0246 by Estella Pina RN)  Discharge to home or other facility with appropriate resources:   Identify barriers to discharge with patient and caregiver   Arrange for needed discharge resources and transportation as appropriate   Identify discharge learning needs (meds, wound care, etc)   Refer to discharge planning if patient needs post-hospital services based on physician order or complex needs related to functional status, cognitive ability or social support system  8/25/2023 0246 by Estella Pina RN  Outcome: Progressing  Flowsheets (Taken 8/25/2023 0246)  Discharge to home or other facility with appropriate resources:   Identify barriers to discharge with patient and caregiver   Arrange for needed discharge resources and transportation as appropriate   Identify discharge learning needs (meds, wound care, etc)   Refer to discharge planning if patient needs post-hospital services based on physician order or complex needs related to functional status, cognitive ability or social support system     Problem: Pain  Goal: Verbalizes/displays adequate comfort level or baseline comfort level  8/25/2023 1407 by Bob Villaseñor RN  Outcome: Progressing  Flowsheets (Taken 8/25/2023 0246 by Estella Pina RN)  Verbalizes/displays adequate comfort level or baseline comfort level:   Encourage patient to monitor pain and request assistance   Assess pain using appropriate pain scale   Administer analgesics based on type and severity of pain and evaluate response   Implement non-pharmacological measures as appropriate and evaluate response   Consider cultural and social influences on pain and pain management   Notify Licensed Independent Practitioner if interventions unsuccessful or patient reports new pain  8/25/2023 0246 by
Problem: Discharge Planning  Goal: Discharge to home or other facility with appropriate resources  Outcome: Progressing  Flowsheets (Taken 8/18/2023 2000 by Joi Lugo RN)  Discharge to home or other facility with appropriate resources:   Identify barriers to discharge with patient and caregiver   Arrange for needed discharge resources and transportation as appropriate   Identify discharge learning needs (meds, wound care, etc)   Refer to discharge planning if patient needs post-hospital services based on physician order or complex needs related to functional status, cognitive ability or social support system     Problem: Pain  Goal: Verbalizes/displays adequate comfort level or baseline comfort level  Outcome: Not Progressing  Flowsheets (Taken 8/19/2023 0805)  Verbalizes/displays adequate comfort level or baseline comfort level:   Encourage patient to monitor pain and request assistance   Assess pain using appropriate pain scale   Administer analgesics based on type and severity of pain and evaluate response   Implement non-pharmacological measures as appropriate and evaluate response   Consider cultural and social influences on pain and pain management     Problem: Safety - Adult  Goal: Free from fall injury  Outcome: Progressing     Problem: Pain  Goal: Verbalizes/displays adequate comfort level or baseline comfort level  Outcome: Not Progressing  Flowsheets (Taken 8/19/2023 0805)  Verbalizes/displays adequate comfort level or baseline comfort level:   Encourage patient to monitor pain and request assistance   Assess pain using appropriate pain scale   Administer analgesics based on type and severity of pain and evaluate response   Implement non-pharmacological measures as appropriate and evaluate response   Consider cultural and social influences on pain and pain management   Care plan reviewed with patient. Patient verbalizes understanding of the care plan and contributed to goal setting.
Problem: Discharge Planning  Goal: Discharge to home or other facility with appropriate resources  Outcome: Progressing  Flowsheets (Taken 8/21/2023 0800 by Lnida Ferrari RN)  Discharge to home or other facility with appropriate resources:   Identify barriers to discharge with patient and caregiver   Arrange for needed discharge resources and transportation as appropriate   Identify discharge learning needs (meds, wound care, etc)   Arrange for interpreters to assist at discharge as needed   Refer to discharge planning if patient needs post-hospital services based on physician order or complex needs related to functional status, cognitive ability or social support system       Consult received. Please see SW note dated 8/21.
Problem: Discharge Planning  Goal: Discharge to home or other facility with appropriate resources  Outcome: Progressing  Flowsheets (Taken 8/24/2023 0815)  Discharge to home or other facility with appropriate resources:   Identify barriers to discharge with patient and caregiver   Arrange for needed discharge resources and transportation as appropriate   Identify discharge learning needs (meds, wound care, etc)   Refer to discharge planning if patient needs post-hospital services based on physician order or complex needs related to functional status, cognitive ability or social support system     Problem: Pain  Goal: Verbalizes/displays adequate comfort level or baseline comfort level  Outcome: Progressing     Problem: Safety - Adult  Goal: Free from fall injury  Outcome: Progressing     Problem: Respiratory - Adult  Goal: Achieves optimal ventilation and oxygenation  Outcome: Progressing     Problem: Gastrointestinal - Adult  Goal: Minimal or absence of nausea and vomiting  Outcome: Progressing  Flowsheets (Taken 8/24/2023 0815)  Minimal or absence of nausea and vomiting:   Administer IV fluids as ordered to ensure adequate hydration   Provide nonpharmacologic comfort measures as appropriate   Advance diet as tolerated, if ordered     Problem: Gastrointestinal - Adult  Goal: Maintains or returns to baseline bowel function  Outcome: Progressing  Flowsheets (Taken 8/24/2023 0815)  Maintains or returns to baseline bowel function:   Assess bowel function   Encourage oral fluids to ensure adequate hydration   Administer IV fluids as ordered to ensure adequate hydration   Administer ordered medications as needed   Encourage mobilization and activity   Nutrition consult to assist patient with appropriate food choices     Problem: Gastrointestinal - Adult  Goal: Maintains adequate nutritional intake  Outcome: Progressing  Flowsheets (Taken 8/24/2023 0815)  Maintains adequate nutritional intake:   Monitor percentage of
Problem: Discharge Planning  Goal: Discharge to home or other facility with appropriate resources  Outcome: Progressing  Flowsheets (Taken 8/27/2023 0101)  Discharge to home or other facility with appropriate resources:   Identify barriers to discharge with patient and caregiver   Arrange for needed discharge resources and transportation as appropriate   Identify discharge learning needs (meds, wound care, etc)   Refer to discharge planning if patient needs post-hospital services based on physician order or complex needs related to functional status, cognitive ability or social support system     Problem: Pain  Goal: Verbalizes/displays adequate comfort level or baseline comfort level  Outcome: Progressing  Flowsheets (Taken 8/27/2023 0101)  Verbalizes/displays adequate comfort level or baseline comfort level:   Encourage patient to monitor pain and request assistance   Assess pain using appropriate pain scale   Implement non-pharmacological measures as appropriate and evaluate response     Problem: Safety - Adult  Goal: Free from fall injury  Outcome: Progressing  Flowsheets (Taken 8/27/2023 0101)  Free From Fall Injury:   Instruct family/caregiver on patient safety   Based on caregiver fall risk screen, instruct family/caregiver to ask for assistance with transferring infant if caregiver noted to have fall risk factors     Problem: Respiratory - Adult  Goal: Achieves optimal ventilation and oxygenation  Outcome: Progressing  Flowsheets (Taken 8/27/2023 0101)  Achieves optimal ventilation and oxygenation:   Assess for changes in respiratory status   Assess for changes in mentation and behavior   Oxygen supplementation based on oxygen saturation or arterial blood gases   Respiratory therapy support as indicated   Assess and instruct to report shortness of breath or any respiratory difficulty     Problem: Gastrointestinal - Adult  Goal: Minimal or absence of nausea and vomiting  Outcome: Progressing  Goal:
Problem: Pain  Goal: Verbalizes/displays adequate comfort level or baseline comfort level  8/19/2023 0929 by Franklin Castano RN  Outcome: Progressing  Flowsheets (Taken 8/19/2023 0931)  Verbalizes/displays adequate comfort level or baseline comfort level:   Encourage patient to monitor pain and request assistance   Assess pain using appropriate pain scale   Implement non-pharmacological measures as appropriate and evaluate response   Administer analgesics based on type and severity of pain and evaluate response   Consider cultural and social influences on pain and pain management   Notify Licensed Independent Practitioner if interventions unsuccessful or patient reports new pain  Note: Patient complains of a significant amount of pain. Patient has a HX of chronic pain and follows with the pain clinic. Reminded patient to report any pain, pressure, or shortness of breath to the nurse. Pain medications provided per physician's orders. Problem: Discharge Planning  Goal: Discharge to home or other facility with appropriate resources  8/19/2023 0929 by Franklin Castano RN  Flowsheets (Taken 8/19/2023 4444)  Discharge to home or other facility with appropriate resources:   Identify barriers to discharge with patient and caregiver   Arrange for needed discharge resources and transportation as appropriate   Identify discharge learning needs (meds, wound care, etc)   Arrange for interpreters to assist at discharge as needed   Refer to discharge planning if patient needs post-hospital services based on physician order or complex needs related to functional status, cognitive ability or social support system  Note: POD #1 following CABG with Dr. Wilfred Francisco. Plan is to return home to her apartment where she lives with significant other when she is discharged. At this time she continues to meet ICU criteria as she is currently on an Epi gtt. Patient is extubated and is progressing well.        Problem: Safety -
Problem: Respiratory - Adult  Goal: Achieves optimal ventilation and oxygenation  8/22/2023 1313 by Mt Alonso RCP  Outcome: Progressing  Flowsheets (Taken 8/22/2023 0200)  Achieves optimal ventilation and oxygenation:   Assess for changes in respiratory status   Assess for changes in mentation and behavior   Position to facilitate oxygenation and minimize respiratory effort   Oxygen supplementation based on oxygen saturation or arterial blood gases   Encourage broncho-pulmonary hygiene including cough, deep breathe, incentive spirometry   Assess the need for suctioning and aspirate as needed   Assess and instruct to report shortness of breath or any respiratory difficulty   Respiratory therapy support as indicated  Wened from high flow to 6lpm nc and tolerating well. Pt agrees with the plan.
broncho-pulmonary hygiene including cough, deep breathe, incentive spirometry   Assess the need for suctioning and aspirate as needed   Assess and instruct to report shortness of breath or any respiratory difficulty   Respiratory therapy support as indicated
Monitor all insertion sites i.e., indwelling lines, tubes and drains   Administer medications as ordered   Instruct and encourage patient and family to use good hand hygiene technique     Problem: Infection - Adult  Goal: Absence of infection during hospitalization  8/25/2023 0246 by Estella Pina RN  Outcome: Progressing  Flowsheets (Taken 8/25/2023 0246)  Absence of infection during hospitalization:   Assess and monitor for signs and symptoms of infection   Monitor lab/diagnostic results   Monitor all insertion sites i.e., indwelling lines, tubes and drains   Administer medications as ordered   Instruct and encourage patient and family to use good hand hygiene technique     Problem: Nutrition Deficit:  Goal: Optimize nutritional status  8/25/2023 0246 by Estella Pina RN  Outcome: Progressing  Flowsheets (Taken 8/25/2023 0246)  Nutrient intake appropriate for improving, restoring, or maintaining nutritional needs:   Assess nutritional status and recommend course of action   Monitor oral intake, labs, and treatment plans  Care plan reviewed with patient and family. Patient and family verbalize understanding of the plan of care and contribute to goal setting.
goal setting.

## 2023-08-28 NOTE — DISCHARGE SUMMARY
prazosin 1 MG capsule  Commonly known as: MINIPRESS               Where to Get Your Medications        These medications were sent to [place] [address] [address] [facility] [place], [place] [address]      Phone: [phone]   aspirin 325 MG EC tablet  furosemide 20 MG tablet  midodrine 5 MG tablet  multivitamin Tabs tablet  oxyCODONE 5 MG immediate release tablet  potassium chloride 10 MEQ extended release tablet         Diet: AHA diet as tolerated    Activity: As instructed on discharge, no lifting more than 10 lbs for one month, no driving while on narcotics. Aerobic activity (walking, climbing stairs, etc.) is encouraged. Wound Care: Clean wounds as instructed on discharge    OFFICE VISIT   ? You should have a follow up appointment in the office in about 1 month after discharge from the hospital.   ?   You may call the office to make an appointment, if you don't have an appointment. ([phone]). ? You will need a chest xray and labs taken around 3-7 days before your follow up appointment. ?   Bring any questions you have so they may be addressed. ? You will need a follow up appointment with you primary care doctor in 7-10 days from discharge, please call and make one if you do not have one.   ? You will need a follow up appointment with you Cardiologist in 2-3 weeks from discharge, please call and make one if you do not have one.   ? BRING YOUR MEDICATION LIST and medications even over the counter medications to all your follow up apointments. ? Office Location:   [address] [place] [address], [address] [place] [address]            EMERGENCIES   ? You should either call 911 or go to the Emergency Room to be evaluated if you need seen immediately. ?         Sudden, severe shortness of breath go to the Emergency Room.     If you have questions regarding these instructions, please call our

## 2023-09-05 ENCOUNTER — TELEPHONE (OUTPATIENT)
Dept: CARDIOTHORACIC SURGERY | Age: 53
End: 2023-09-05

## 2023-09-05 DIAGNOSIS — Z95.1 S/P CABG (CORONARY ARTERY BYPASS GRAFT): Primary | ICD-10-CM

## 2023-09-05 RX ORDER — HYDROCODONE BITARTRATE AND ACETAMINOPHEN 5; 325 MG/1; MG/1
1 TABLET ORAL EVERY 8 HOURS PRN
Qty: 21 TABLET | Refills: 0 | Status: SHIPPED | OUTPATIENT
Start: 2023-09-05 | End: 2023-09-12

## 2023-09-05 NOTE — TELEPHONE ENCOUNTER
Pt requesting refill of pain medication. She states that she has been sneezing lately and she took her last dose of oxycodone on Sunday. The sneezing has been causing her pain. Please send to [facility].

## 2023-09-12 ENCOUNTER — HOSPITAL ENCOUNTER (OUTPATIENT)
Dept: GENERAL RADIOLOGY | Age: 53
Discharge: HOME | End: 2023-09-12
Payer: MEDICAID

## 2023-09-12 ENCOUNTER — OFFICE VISIT (OUTPATIENT)
Dept: CARDIOTHORACIC SURGERY | Age: 53
End: 2023-09-12

## 2023-09-12 ENCOUNTER — HOSPITAL ENCOUNTER (OUTPATIENT)
Age: 53
Discharge: HOME | End: 2023-09-12
Payer: MEDICAID

## 2023-09-12 VITALS
HEART RATE: 83 BPM | DIASTOLIC BLOOD PRESSURE: 63 MMHG | WEIGHT: 164.8 LBS | OXYGEN SATURATION: 98 % | SYSTOLIC BLOOD PRESSURE: 120 MMHG | BODY MASS INDEX: 33.22 KG/M2 | HEIGHT: 59 IN

## 2023-09-12 DIAGNOSIS — Z95.1 S/P CABG (CORONARY ARTERY BYPASS GRAFT): Primary | ICD-10-CM

## 2023-09-12 DIAGNOSIS — Z95.1 S/P CABG (CORONARY ARTERY BYPASS GRAFT): ICD-10-CM

## 2023-09-12 LAB
ANION GAP SERPL CALC-SCNC: 12 MEQ/L (ref 8–16)
BUN SERPL-MCNC: 13 MG/DL (ref 7–22)
CALCIUM SERPL-MCNC: 10.3 MG/DL (ref 8.5–10.5)
CHLORIDE SERPL-SCNC: 106 MEQ/L (ref 98–111)
CO2 SERPL-SCNC: 21 MEQ/L (ref 23–33)
CREAT SERPL-MCNC: 0.9 MG/DL (ref 0.4–1.2)
DEPRECATED RDW RBC AUTO: 47.8 FL (ref 35–45)
ERYTHROCYTE [DISTWIDTH] IN BLOOD BY AUTOMATED COUNT: 13.8 % (ref 11.5–14.5)
GFR SERPL CREATININE-BSD FRML MDRD: > 60 ML/MIN/1.73M2
GLUCOSE SERPL-MCNC: 94 MG/DL (ref 70–108)
HCT VFR BLD AUTO: 41.5 % (ref 37–47)
HGB BLD-MCNC: 12.9 GM/DL (ref 12–16)
MCH RBC QN AUTO: 29.7 PG (ref 26–33)
MCHC RBC AUTO-ENTMCNC: 31.1 GM/DL (ref 32.2–35.5)
MCV RBC AUTO: 95.6 FL (ref 81–99)
PLATELET # BLD AUTO: 322 THOU/MM3 (ref 130–400)
PMV BLD AUTO: 8.9 FL (ref 9.4–12.4)
POTASSIUM SERPL-SCNC: 4.8 MEQ/L (ref 3.5–5.2)
RBC # BLD AUTO: 4.34 MILL/MM3 (ref 4.2–5.4)
SODIUM SERPL-SCNC: 139 MEQ/L (ref 135–145)
WBC # BLD AUTO: 8.6 THOU/MM3 (ref 4.8–10.8)

## 2023-09-12 PROCEDURE — 80048 BASIC METABOLIC PNL TOTAL CA: CPT

## 2023-09-12 PROCEDURE — 99024 POSTOP FOLLOW-UP VISIT: CPT | Performed by: PHYSICIAN ASSISTANT

## 2023-09-12 PROCEDURE — 71046 X-RAY EXAM CHEST 2 VIEWS: CPT

## 2023-09-12 PROCEDURE — 85027 COMPLETE CBC AUTOMATED: CPT

## 2023-09-12 PROCEDURE — 36415 COLL VENOUS BLD VENIPUNCTURE: CPT

## 2023-09-12 RX ORDER — TRAMADOL HYDROCHLORIDE 50 MG/1
50 TABLET ORAL EVERY 8 HOURS PRN
Qty: 21 TABLET | Refills: 0 | Status: SHIPPED | OUTPATIENT
Start: 2023-09-12 | End: 2023-09-19

## 2023-09-12 NOTE — PATIENT INSTRUCTIONS
You may receive a survey regarding the care you received during your visit. Your input is valuable to us. We encourage you to complete and return your survey. We hope you will choose us in the future for your healthcare needs. Thank you for choosing [address] [place] [address]!   Your Medical Assistant Today:  Inge SANDERS   Your Provider for Today: Chai Akhtar PA-C  Ph. [phone] opt 1

## 2023-09-12 NOTE — PROGRESS NOTES
CT/CV Surgery Follow Up Office Visit      Patient's Name/Date of Birth: Kellie Joyce / 1970 (48 y.o.)    CC:   Chief Complaint   Patient presents with    Follow Up After Procedure     S/P  CABG x1 08.18.2023 with Dr. Magda Francisco     Results     Labs/ CXR        PCP: NATASHA Quinn NP    Date: September 12, 2023     HPI:   We had the pleasure of seeing Kellie Joyce in the office today, as you know this is a very pleasant 48y.o. year old female with a history of CAD. She is S/p CABG X 1 with SVG to LAD on 8/18/23 by Dr. Magda Francisco. She is gradually getting better. CXR PA & LATERAL:9/12/23       Past Medical History:  Adali Jimenez  has a past medical history of Abnormal Pap smear of cervix, Arthritis, CAD (coronary artery disease), Chronic back pain, Chronic kidney disease, ddd, Endometriosis, Hip pain, Hyperlipidemia, MRSA (methicillin resistant staph aureus) culture positive, Obesity, and Pneumonia. Past Surgical History:  The patient  has a past surgical history that includes Tubal ligation; Tonsillectomy; Bunionectomy; Hemorrhoid surgery; Pilonidal cyst drainage; LEEP; Pain management procedure (Bilateral, 12/06/2022); Carpal tunnel release (Right); and Coronary artery bypass graft (N/A, 8/18/2023). Allergies: The patient is allergic to ibuprofen and naproxen. Medications:  Prior to Admission medications    Medication Sig Start Date End Date Taking? Authorizing Provider   HYDROcodone-acetaminophen (NORCO) 5-325 MG per tablet Take 1 tablet by mouth every 8 hours as needed for Pain for up to 7 days. Intended supply: 5 days.  Take lowest dose possible to manage pain Max Daily Amount: 3 tablets 9/5/23 9/12/23 Yes Susanne Valdes PA-C   Multiple Vitamin (MULTIVITAMIN) TABS tablet Take 1 tablet by mouth daily (with breakfast) 8/29/23  Yes Deanna Hung PA-C   aspirin 325 MG EC tablet Take 1 tablet by mouth daily 8/29/23  Yes Deanna Hung PA-C   metoprolol tartrate (LOPRESSOR) 25 MG tablet Take 1

## 2023-09-26 ENCOUNTER — TELEPHONE (OUTPATIENT)
Dept: CARDIOLOGY CLINIC | Age: 53
End: 2023-09-26

## 2023-09-26 NOTE — TELEPHONE ENCOUNTER
Georgiana Jorge called from [employer] [employer], pt was in having trouble with angina and is asking if ok that she takes ranexa 500 bid, and cant sleep so sent melatonin 5 mg. They are asking if this is ok for her to take post op CABG.

## 2023-09-28 ENCOUNTER — HOSPITAL ENCOUNTER (OUTPATIENT)
Dept: CARDIAC REHAB | Age: 53
Setting detail: THERAPIES SERIES
Discharge: HOME | End: 2023-09-28
Payer: MEDICAID

## 2023-09-28 VITALS — HEIGHT: 59 IN | BODY MASS INDEX: 34.76 KG/M2 | WEIGHT: 172.4 LBS

## 2023-09-28 PROCEDURE — G0422 INTENS CARDIAC REHAB W/EXERC: HCPCS

## 2023-09-28 PROCEDURE — G0423 INTENS CARDIAC REHAB NO EXER: HCPCS

## 2023-09-28 NOTE — PLAN OF CARE
[facility] Facility-Based Program  Individualized Cardiac Treatment Plan    Patient Name:  Kvng Ward  :  1970  Age:  48 y.o. MRN:  [id number]  Diagnosis: CABG  Date of Event: 23   Physician:  Michael Thorne Office Visit:  10/2  Date Entered Program: 23  Risk Stratifications: [] Low [x] Intermediate [] High  Allergies:    Allergies   Allergen Reactions    Ibuprofen Rash    Naproxen Rash       Individual Cardiac Treatment Plan -EXERCISE  INITIAL 30 DAY 60 DAY 90  DAY FINAL DAY   EXERCISE  ASSESSMENT/PLAN EXERCISE  REASSESSMENT EXERCISE   REASSESSMENT EXERCISE   REASSESSMENT EXERCISE   REASSESSMENT EXERCISE  DISCHARGE/FOLLOW-UP   Date: 23 Date: Date: Date: Date: Date:   Session #1   Total Session #   First Exercise Session:   Total Session #  Total Session #  Total Session #  Total Session #   Last Exercise Session:     Stages of Change Stages of Change Stages of Change Stages of Change Stages of Change Stages of Change   [] Pre Contemplation  [] Contemplation  [] Preparation  [x] Action  [] Maintenance  [] Relapse [] Pre Contemplation  [] Contemplation  [] Preparation  [] Action  [] Maintenance  [] Relapse [] Pre Contemplation  [] Contemplation  [] Preparation  [] Action  [] Maintenance  [] Relapse [] Pre Contemplation  [] Contemplation  [] Preparation  [] Action  [] Maintenance  [] Relapse [] Pre Contemplation  [] Contemplation  [] Preparation  [] Action  [] Maintenance  [] Relapse [] Pre Contemplation  [] Contemplation  [] Preparation  [] Action  [] Maintenance  [] Relapse   EXERCISE ASSESSMENT EXERCISE ASSESSMENT EXERCISE ASSESSMENT EXERCISE ASSESSMENT EXERCISE ASSESSMENT EXERCISE ASSESSMENT   6 Min Walk Test  Distance walked:   0.07 miles  369 ft.  1.5 METs  Max HR:110 BPM      RPE:  13    THR:  126-142  Rhythm:  NSR     6 Min Walk Test  Distance walked:    miles   ft   METs  Max HR: BPM      RPE:    %Change ft=

## 2023-09-28 NOTE — PROGRESS NOTES
Video Education Report - ICR/CR  Name:  Radha Pickering     Date:  9/28/2023  MRN: [id number]     Session #:  1  Session Length: 40 min    Core Videos        [x]Heart Disease Risk Reduction       []Overview of Pritikin Eating Plan      []Move it          []Calorie Density         []Healthy Minds, Bodies, Hearts        []Label Reading - Part 1       []Metabolic Syndrome and Belly Fat        []How Our Thoughts Can Heal Our Hearts   []Dining Out - Part 1      []Biomechanical Limitations  []Facts on Fat        []Hypertension & Heart Disease    []Diseases of Our Time - Focusing on Diabetes   []Body Composition  []Nutrition Action Plan    []Exercise Action Plan    Exercise      Healthy Mind-Set  []Improving Performance    []Smoking Cessation    []Introduction to [address]  []Aging-Enhancing the Quality of Your Life  []Becoming a Pritikin   []Biology of Weight Control    []Cooking Breakfasts   []Decoding Lab Results    and Snacks  []Diseases of Our Time - Overview   []Cooking Dinner and   []Sleep Disorders     Sides  []Targeting Your Nutrition Priorities   []Healthy Salads &   Dressings  Nutrition      []Cooking Soups and   []Dining Out - Part 2     Desserts  []Fueling a Healthy Body   []Menu Workshop     Overview  []Planning Your Eating Strategy   []The Pritikin Solution  []Vitamins and Minerals    Comments:  Video completed, group discussion

## 2023-10-04 ENCOUNTER — TELEPHONE (OUTPATIENT)
Dept: CARDIAC REHAB | Age: 53
End: 2023-10-04

## 2023-10-04 NOTE — TELEPHONE ENCOUNTER
Cardiac Rehab  Left a message at Stacy's home # to see why she did not show for her appointment. Staff left a message to call cardiac rehab back.

## 2023-10-06 ENCOUNTER — TELEPHONE (OUTPATIENT)
Dept: CARDIAC REHAB | Age: 53
End: 2023-10-06

## 2023-10-06 NOTE — TELEPHONE ENCOUNTER
Shaun Buckley was scheduled to start cardiac rehab on 10/4/23. [facility] was set up for transportation. Called [facility] this morning to follow up. They have gone to pick her up both days, and Shaun Buckley has not answered her phone or door. A message was left for her on Wednesday by the CR staff, and it has not been returned. If we do not hear back from her by next Wednesday, 10/11/23, she will be discharged.

## 2023-10-11 ENCOUNTER — HOSPITAL ENCOUNTER (OUTPATIENT)
Dept: CARDIAC REHAB | Age: 53
Setting detail: THERAPIES SERIES
Discharge: HOME | End: 2023-10-11

## 2023-10-11 NOTE — PLAN OF CARE
Attended/Date Healthy Mind-Set Workshops Attended/Date Healthy Mind-Set Workshops  Completed  [] Yes      [] No Healthy Mind-Set Workshops  Completed  [] Yes      [] No   *Goals* *Goals* *Goals* *Goals* *Goals* *Goals*   Psychosocial Goals:  Clair's psychosocial goals are  to reduced her perceived depression by one point in the next 4 weeks by attending healthy mindset education Progress towards goals:  Dede Carrier ** Progress towards goals:  Dede Carrier ** Progress towards goals:  Dede Carrier ** Progress towards goals:  Dede Carrier ** Progress towards goals:  Dede Carrier **     Individual Cardiac Treatment Plan - Other:  Risk Factor/Core Components  RISK FACTOR  ASSESSMENT/PLAN RISK FACTOR  REASSESSMENT  RISK FACTOR  REASSESSMENT RISK FACTOR  REASSESSMENT RISK FACTOR   DISCHARGE/FOLLOW-UP RISK FACTOR   DISCHARGE/FOLLOW-UP   Stages of Change Stages of Change Stages of Change Stages of Change Stages of Change Stages of Change   [] Pre Contemplation  [x] Contemplation  [] Preparation  [] Action  [] Maintenance  [] Relapse [] Pre Contemplation  [] Contemplation  [] Preparation  [] Action  [] Maintenance  [] Relapse [] Pre Contemplation  [] Contemplation  [] Preparation  [] Action  [] Maintenance  [] Relapse [] Pre Contemplation  [] Contemplation  [] Preparation  [] Action  [] Maintenance  [] Relapse [] Pre Contemplation  [] Contemplation  [] Preparation  [] Action  [] Maintenance  [] Relapse [] Pre Contemplation  [] Contemplation  [] Preparation  [] Action  [] Maintenance  [] Relapse   RISK FACTOR/EDUCATION ASSESSMENT RISK FACTOR/EDUCATION ASSESSMENT RISK FACTOR/EDUCATION ASSESSMENT RISK FACTOR/EDUCATION ASSESSMENT RISK FACTOR /EDUCATION ASSESSMENT RISK FACTOR /EDUCATION ASSESSMENT   Hypertension  [] Yes      [x] No    Resting BP: 92/54  Peak Ex BP:110/78  Medication: metoprolol   Hypertension  Resting BP:   Peak Ex BP:  Medication Changes:  [] Yes      [] No Hypertension  Resting BP:   Peak Ex BP:  Medication Changes:  [] Yes      [] No

## 2023-10-16 ENCOUNTER — TELEPHONE (OUTPATIENT)
Dept: CARDIOLOGY CLINIC | Age: 53
End: 2023-10-16

## 2023-10-16 NOTE — TELEPHONE ENCOUNTER
S/p CABG x 1 with Paco Schwab-  08.18.2023  Post op apt with Kiah Butcher [phone]    Patient called office today with complaint of the \"top part of her sternum sticking out -  Started last Wednesday- 10.11.2023- patient Gilberto Velazquez ([address] ) came out to the house on Friday 10.13.2023 -  stated that it might be dislodged - asked that the patient call the office on Monday. Called avril to obtain notes [phone]- Spoke with Contreras Neal RN -  Not hard, Puffy, no redness, no fever (Sticking out about 1/2 inch)    Patient admits to pain, stiffness,  muscle spasms and  some pressure on the throat. No trouble swallowing at this time. Patient was noted to be clearing her throat a lot while on the phone.

## 2023-10-17 ENCOUNTER — OFFICE VISIT (OUTPATIENT)
Dept: CARDIOTHORACIC SURGERY | Age: 53
End: 2023-10-17

## 2023-10-17 ENCOUNTER — HOSPITAL ENCOUNTER (OUTPATIENT)
Age: 53
Discharge: HOME | End: 2023-10-17
Payer: MEDICAID

## 2023-10-17 ENCOUNTER — HOSPITAL ENCOUNTER (OUTPATIENT)
Dept: GENERAL RADIOLOGY | Age: 53
Discharge: HOME | End: 2023-10-17
Payer: MEDICAID

## 2023-10-17 VITALS
HEIGHT: 59 IN | WEIGHT: 172 LBS | BODY MASS INDEX: 34.68 KG/M2 | HEART RATE: 75 BPM | SYSTOLIC BLOOD PRESSURE: 112 MMHG | DIASTOLIC BLOOD PRESSURE: 82 MMHG

## 2023-10-17 DIAGNOSIS — Z95.1 S/P CABG (CORONARY ARTERY BYPASS GRAFT): Primary | ICD-10-CM

## 2023-10-17 DIAGNOSIS — Z95.1 S/P CABG (CORONARY ARTERY BYPASS GRAFT): ICD-10-CM

## 2023-10-17 PROCEDURE — 99024 POSTOP FOLLOW-UP VISIT: CPT | Performed by: PHYSICIAN ASSISTANT

## 2023-10-17 PROCEDURE — 71046 X-RAY EXAM CHEST 2 VIEWS: CPT

## 2023-10-17 RX ORDER — RANOLAZINE 500 MG/1
500 TABLET, EXTENDED RELEASE ORAL 2 TIMES DAILY
COMMUNITY

## 2023-10-17 NOTE — PROGRESS NOTES
CT/CV Surgery Follow Up Office Visit      Patient's Name/Date of Birth: Chris Merino / 1970 (48 y.o.)    CC:   Chief Complaint   Patient presents with    Follow Up After Procedure     S/p CABG x 1 with Katherine Rubio-  08.18.2023- bump on the top part of the sternum since Wednesday 10.11.2023        PCP: NATASHA Garcia - NP    Date: October 17, 2023     HPI:   We had the pleasure of seeing Chris Merino in the office today, as you know this is a very pleasant 48y.o. year old female with a history of CAD. She is S/p CABG X 1 with SVG to LAD on 8/18/23 by Dr. Katherine Rubio. She was gradually getting better until this past week when she noticed some pain with coughing. The pain is located near the upper border of her sternum. CXR PA & LATERAL:9/12/23         CXR PA & LATERAL:10/17/23      Past Medical History:  Maria Victoria Hall  has a past medical history of Abnormal Pap smear of cervix, Arthritis, CAD (coronary artery disease), Chronic back pain, Chronic kidney disease, ddd, Endometriosis, Hip pain, Hyperlipidemia, MRSA (methicillin resistant staph aureus) culture positive, Obesity, and Pneumonia. Past Surgical History:  The patient  has a past surgical history that includes Tubal ligation; Tonsillectomy; Bunionectomy; Hemorrhoid surgery; Pilonidal cyst drainage; LEEP; Pain management procedure (Bilateral, 12/06/2022); Carpal tunnel release (Right); and Coronary artery bypass graft (N/A, 8/18/2023). Allergies: The patient is allergic to ibuprofen and naproxen. Medications:  Prior to Admission medications    Medication Sig Start Date End Date Taking?  Authorizing Provider   ranolazine (RANEXA) 500 MG extended release tablet Take 1 tablet by mouth 2 times daily   Yes Provider, MD Ina   Multiple Vitamin (MULTIVITAMIN) TABS tablet Take 1 tablet by mouth daily (with breakfast) 8/29/23  Yes Nicolas Flores PA-C   aspirin 325 MG EC tablet Take 1 tablet by mouth daily 8/29/23  Yes Nicolas Flores PA-C

## 2023-12-20 ENCOUNTER — OFFICE VISIT (OUTPATIENT)
Dept: CARDIOLOGY CLINIC | Age: 53
End: 2023-12-20
Payer: MEDICAID

## 2023-12-20 VITALS
BODY MASS INDEX: 35.2 KG/M2 | HEIGHT: 59 IN | DIASTOLIC BLOOD PRESSURE: 66 MMHG | WEIGHT: 174.6 LBS | HEART RATE: 68 BPM | SYSTOLIC BLOOD PRESSURE: 116 MMHG

## 2023-12-20 DIAGNOSIS — Z98.890 S/P CARDIAC CATH: ICD-10-CM

## 2023-12-20 DIAGNOSIS — R94.31 ABNORMAL EKG: ICD-10-CM

## 2023-12-20 DIAGNOSIS — R60.0 BILATERAL LEG EDEMA: ICD-10-CM

## 2023-12-20 DIAGNOSIS — I25.10 CORONARY ARTERY DISEASE INVOLVING NATIVE CORONARY ARTERY OF NATIVE HEART WITHOUT ANGINA PECTORIS: Primary | ICD-10-CM

## 2023-12-20 DIAGNOSIS — E78.5 DYSLIPIDEMIA: ICD-10-CM

## 2023-12-20 DIAGNOSIS — R06.02 SOB (SHORTNESS OF BREATH) ON EXERTION: ICD-10-CM

## 2023-12-20 DIAGNOSIS — R07.89 CHEST WALL PAIN: ICD-10-CM

## 2023-12-20 DIAGNOSIS — Z72.0 TOBACCO ABUSE: ICD-10-CM

## 2023-12-20 PROCEDURE — G8427 DOCREV CUR MEDS BY ELIG CLIN: HCPCS | Performed by: INTERNAL MEDICINE

## 2023-12-20 PROCEDURE — 93000 ELECTROCARDIOGRAM COMPLETE: CPT | Performed by: INTERNAL MEDICINE

## 2023-12-20 PROCEDURE — 1036F TOBACCO NON-USER: CPT | Performed by: INTERNAL MEDICINE

## 2023-12-20 PROCEDURE — G8417 CALC BMI ABV UP PARAM F/U: HCPCS | Performed by: INTERNAL MEDICINE

## 2023-12-20 PROCEDURE — G8484 FLU IMMUNIZE NO ADMIN: HCPCS | Performed by: INTERNAL MEDICINE

## 2023-12-20 PROCEDURE — 3017F COLORECTAL CA SCREEN DOC REV: CPT | Performed by: INTERNAL MEDICINE

## 2023-12-20 PROCEDURE — 99214 OFFICE O/P EST MOD 30 MIN: CPT | Performed by: INTERNAL MEDICINE

## 2023-12-20 RX ORDER — TRIAMTERENE AND HYDROCHLOROTHIAZIDE 37.5; 25 MG/1; MG/1
0.5 TABLET ORAL EVERY OTHER DAY
Qty: 45 TABLET | Refills: 0 | Status: SHIPPED | OUTPATIENT
Start: 2023-12-20

## 2023-12-20 NOTE — PROGRESS NOTES
Chief Complaint   Patient presents with    3 Month Follow-Up    Check-Up       Originally Referred by pulm  for leg edema and pre op eval      Pt here for a 3 month f/u    EKG done today    Iggy Gordon originally declined CABG  and later had the CABG x1 2023    Leg edema got worse recently  After the surgery started to have chest wall pain  and can not move her left arm well and can not sleep on left or rt  and induce chest wall pain with sleep on left or right    Cont  sob on exertion  not worse    Walk better after surgery    Denied palpitation, dizziness    Completed cardiac rehab    Heart burn better    Used to have Leg edema for the last several week, usually at end of the day  Now leg edema +1 post CABG    No wt gain    Sob on exertion for the last 6 month and PFT nonrevealing      Hx of palpitation   daily- better      Off smoking     Smokes 1.5 ppd for 45 yrs    Soledad Beard had  CABG in his lats 42's  Father  of lung ca at age 54  [address] parent had heart ds detail unknown      Past Surgical History:   Procedure Laterality Date    BUNIONECTOMY      CARPAL TUNNEL RELEASE Right     COLONOSCOPY      CORONARY ARTERY BYPASS GRAFT N/A 2023    CABG X1 /ALBERTINA performed by Lupe Shields MD at [address]      LEEP      PAIN MANAGEMENT PROCEDURE Bilateral 2022    Lumbar Facet Medial branch block Lumbar 4-5, 5-Sacral 1 bilateral performed by Ashutosh Chávez MD at [address]         Allergies   Allergen Reactions    Ibuprofen Rash    Naproxen Rash        Family History   Problem Relation Age of Onset    Seizures Mother     High Cholesterol Mother     Lung Cancer Father     Arthritis Neg Hx     Asthma Neg Hx     Birth Defects Neg Hx     Cancer Neg Hx     Depression Neg Hx     Diabetes Neg Hx     Early Death Neg Hx     Hearing Loss Neg Hx     Heart Disease Neg Hx     High Blood Pressure Neg Hx     Kidney Disease

## 2023-12-25 PROBLEM — I20.8 ANGINAL EQUIVALENT: Status: RESOLVED | Noted: 2023-03-14 | Resolved: 2023-12-25

## 2023-12-25 PROBLEM — I20.89 ANGINAL EQUIVALENT: Status: RESOLVED | Noted: 2023-03-14 | Resolved: 2023-12-25

## 2023-12-29 RX ORDER — ASPIRIN 325 MG
325 TABLET, DELAYED RELEASE (ENTERIC COATED) ORAL DAILY
Qty: 30 TABLET | Refills: 3 | Status: SHIPPED | OUTPATIENT
Start: 2023-12-29

## 2023-12-29 NOTE — TELEPHONE ENCOUNTER
Mathieu Roman called requesting a refill on the following medications:  Requested Prescriptions     Pending Prescriptions Disp Refills    aspirin 325 MG EC tablet 30 tablet 3     Sig: Take 1 tablet by mouth daily     [facility]   . pv      Date of last visit:   Date of next visit (if applicable): 4/56/1372

## 2024-01-03 NOTE — TELEPHONE ENCOUNTER
Clair Romero called requesting a refill on the following medications:  Requested Prescriptions     Pending Prescriptions Disp Refills    metoprolol tartrate (LOPRESSOR) 25 MG tablet 60 tablet 3     Sig: Take 1 tablet by mouth 2 times daily     Pharm[facility] verified:[facility] [phone]  .pv      Date of last visit: 12.20.2023  Date of next visit (if applicable): 3/20/2024

## 2024-01-31 ENCOUNTER — TELEPHONE (OUTPATIENT)
Dept: CARDIOLOGY CLINIC | Age: 54
End: 2024-01-31

## 2024-02-05 RX ORDER — ASPIRIN 325 MG
325 TABLET, DELAYED RELEASE (ENTERIC COATED) ORAL DAILY
Qty: 30 TABLET | Refills: 1 | Status: SHIPPED | OUTPATIENT
Start: 2024-02-05

## 2024-03-11 RX ORDER — ASPIRIN 325 MG
325 TABLET, DELAYED RELEASE (ENTERIC COATED) ORAL DAILY
Qty: 30 TABLET | Refills: 0 | Status: SHIPPED | OUTPATIENT
Start: 2024-03-11

## 2024-04-15 RX ORDER — ASPIRIN 325 MG
325 TABLET, DELAYED RELEASE (ENTERIC COATED) ORAL DAILY
Qty: 30 TABLET | Refills: 0 | Status: SHIPPED | OUTPATIENT
Start: 2024-04-15

## 2024-04-17 ENCOUNTER — TELEPHONE (OUTPATIENT)
Dept: PHYSICAL MEDICINE AND REHAB | Age: 54
End: 2024-04-17

## 2024-04-17 ENCOUNTER — OFFICE VISIT (OUTPATIENT)
Dept: PHYSICAL MEDICINE AND REHAB | Age: 54
End: 2024-04-17
Payer: MEDICAID

## 2024-04-17 ENCOUNTER — TELEPHONE (OUTPATIENT)
Dept: CARDIOLOGY CLINIC | Age: 54
End: 2024-04-17

## 2024-04-17 VITALS
SYSTOLIC BLOOD PRESSURE: 120 MMHG | WEIGHT: 170 LBS | DIASTOLIC BLOOD PRESSURE: 60 MMHG | HEART RATE: 68 BPM | HEIGHT: 59 IN | BODY MASS INDEX: 34.27 KG/M2

## 2024-04-17 DIAGNOSIS — M79.18 MYOFASCIAL PAIN SYNDROME: ICD-10-CM

## 2024-04-17 DIAGNOSIS — M46.1 SI (SACROILIAC) JOINT INFLAMMATION (HCC): ICD-10-CM

## 2024-04-17 DIAGNOSIS — G89.4 CHRONIC PAIN SYNDROME: ICD-10-CM

## 2024-04-17 DIAGNOSIS — M47.816 LUMBAR SPONDYLOSIS: Primary | ICD-10-CM

## 2024-04-17 PROCEDURE — G8417 CALC BMI ABV UP PARAM F/U: HCPCS | Performed by: NURSE PRACTITIONER

## 2024-04-17 PROCEDURE — 99214 OFFICE O/P EST MOD 30 MIN: CPT | Performed by: NURSE PRACTITIONER

## 2024-04-17 PROCEDURE — 3017F COLORECTAL CA SCREEN DOC REV: CPT | Performed by: NURSE PRACTITIONER

## 2024-04-17 PROCEDURE — G8427 DOCREV CUR MEDS BY ELIG CLIN: HCPCS | Performed by: NURSE PRACTITIONER

## 2024-04-17 PROCEDURE — 1036F TOBACCO NON-USER: CPT | Performed by: NURSE PRACTITIONER

## 2024-04-17 ASSESSMENT — ENCOUNTER SYMPTOMS
EYE PAIN: 0
CONSTIPATION: 0
NAUSEA: 0
VOMITING: 0
SORE THROAT: 0
RHINORRHEA: 0
SHORTNESS OF BREATH: 0
COUGH: 0
WHEEZING: 0
PHOTOPHOBIA: 0
DIARRHEA: 0
CHEST TIGHTNESS: 0
COLOR CHANGE: 0
SINUS PRESSURE: 0
ABDOMINAL PAIN: 0
BACK PAIN: 1

## 2024-04-17 NOTE — PROGRESS NOTES
communicative. Speech is not rapid and pressured, delayed, slurred or tangential.         Behavior: Behavior normal. Behavior is not agitated, slowed, aggressive, withdrawn, hyperactive or combative. Behavior is cooperative.         Thought Content: Thought content normal. Thought content is not paranoid or delusional. Thought content does not include homicidal or suicidal ideation. Thought content does not include homicidal or suicidal plan.         Cognition and Memory: Cognition and memory normal. Memory is not impaired. She does not exhibit impaired recent memory or impaired remote memory.         Judgment: Judgment normal. Judgment is not impulsive or inappropriate.      Comments: H/o depression        ERICA  Patricks test  positive  Yeoman's  or Gaenslen's positive  Kemps  positive  Spurlings  na  Walker's na         Assessment:     1. Lumbar spondylosis    2. SI (sacroiliac) joint inflammation (HCC)    3. Myofascial pain syndrome    4. Chronic pain syndrome           Assessment & Plan   Plan:      OARRS reviewed. Current MED: 240 per suboxone clinic   Patient was offered naloxone for home.   Testing Labs or Radiology reviewed: Lumbar and Cervical MRI   Procedures:Lumbar Facet MBB #2 @ L4-5,5-S1 bilateral with IV sedation under Fluoroscopy   Pt is on ASA per Dr Marcum  Discussed with patient about risks with procedure including infection, reaction to medication, increased pain, or bleeding.  Medications:follows [facility]one Clinic    Meds. Prescribed:   No orders of the defined types were placed in this encounter.        Return for Lumbar Facet MBB #2 @ L4-5,5-S1 bilateral with IV sedation under Fluoroscopy F/U June .       Electronically signed by NATASHA Hollins CNP on4/17/2024 at 8:56 AM

## 2024-04-17 NOTE — TELEPHONE ENCOUNTER
Pt needs clearance     Procedure lumbar facet MBB L4-5 S1  Physician [facility] neurp   Date of surgery/procedure     Last OV 12/20/23  Dr Marcum ordered a stress and echo at last OV and it looks like pt no showed     Spoke with pt and she is coming in tomorrow for an appt to discuss

## 2024-04-18 ENCOUNTER — OFFICE VISIT (OUTPATIENT)
Dept: CARDIOLOGY CLINIC | Age: 54
End: 2024-04-18
Payer: MEDICAID

## 2024-04-18 VITALS
DIASTOLIC BLOOD PRESSURE: 80 MMHG | WEIGHT: 171.2 LBS | SYSTOLIC BLOOD PRESSURE: 130 MMHG | HEART RATE: 59 BPM | HEIGHT: 59 IN | BODY MASS INDEX: 34.51 KG/M2

## 2024-04-18 DIAGNOSIS — R07.89 CHEST WALL PAIN: ICD-10-CM

## 2024-04-18 DIAGNOSIS — Z01.818 PRE-OP EVALUATION: ICD-10-CM

## 2024-04-18 DIAGNOSIS — I25.10 CORONARY ARTERY DISEASE INVOLVING NATIVE CORONARY ARTERY OF NATIVE HEART WITHOUT ANGINA PECTORIS: Primary | ICD-10-CM

## 2024-04-18 DIAGNOSIS — R60.0 BILATERAL LEG EDEMA: ICD-10-CM

## 2024-04-18 DIAGNOSIS — E78.5 DYSLIPIDEMIA: ICD-10-CM

## 2024-04-18 DIAGNOSIS — R42 DIZZINESS ON STANDING: ICD-10-CM

## 2024-04-18 DIAGNOSIS — Z98.890 S/P CARDIAC CATH: ICD-10-CM

## 2024-04-18 DIAGNOSIS — R06.02 SOB (SHORTNESS OF BREATH) ON EXERTION: ICD-10-CM

## 2024-04-18 PROBLEM — R94.39 ABNORMAL NUCLEAR STRESS TEST: Status: RESOLVED | Noted: 2023-03-14 | Resolved: 2024-04-18

## 2024-04-18 PROBLEM — R12 HEARTBURN: Status: RESOLVED | Noted: 2023-02-27 | Resolved: 2024-04-18

## 2024-04-18 PROBLEM — Z72.0 TOBACCO ABUSE: Status: RESOLVED | Noted: 2023-02-27 | Resolved: 2024-04-18

## 2024-04-18 PROCEDURE — G8427 DOCREV CUR MEDS BY ELIG CLIN: HCPCS | Performed by: INTERNAL MEDICINE

## 2024-04-18 PROCEDURE — 93000 ELECTROCARDIOGRAM COMPLETE: CPT | Performed by: INTERNAL MEDICINE

## 2024-04-18 PROCEDURE — 1036F TOBACCO NON-USER: CPT | Performed by: INTERNAL MEDICINE

## 2024-04-18 PROCEDURE — 3017F COLORECTAL CA SCREEN DOC REV: CPT | Performed by: INTERNAL MEDICINE

## 2024-04-18 PROCEDURE — G8417 CALC BMI ABV UP PARAM F/U: HCPCS | Performed by: INTERNAL MEDICINE

## 2024-04-18 PROCEDURE — 99214 OFFICE O/P EST MOD 30 MIN: CPT | Performed by: INTERNAL MEDICINE

## 2024-04-18 NOTE — PROGRESS NOTES
Chief Complaint   Patient presents with    3 Month Follow-Up    Coronary Artery Disease    Cardiac Clearance       Originally Referred by pulm  for leg edema and pre op eval      Pat originally declined CABG  and later had the CABG x1 2023        Pt here for a 3 month f/u - needing clearance for Lumbar Facet MBB L4-5 S1 with Neuroscience.    Stress and echo was ordered 23 and pt no showed to both.    EKG done today    Chest wall pain better    Leg edema better after Maxzide    sob on exertion   better    Denied palpitation, dizziness    Completed cardiac rehab    Heart burn better    Used to have Leg edema for the last several week, usually at end of the day  Now leg edema +1 post CABG    No wt gain    Sob on exertion for the last 6 month and PFT nonrevealing      Hx of palpitation   daily- better      Off smoking     Smokes 1.5 ppd for 38 yrs    FHX  Borther had  CABG in his lats 40's  Father  of lung ca at age 55  Grand parent had heart ds detail unknown      Past Surgical History:   Procedure Laterality Date    BUNIONECTOMY      CARPAL TUNNEL RELEASE Right     COLONOSCOPY      CORONARY ARTERY BYPASS GRAFT N/A 2023    CABG X1 /ALBERTINA performed by Uzair Dunham MD at [facility] OR    HEMORRHOID SURGERY      LEEP      PAIN MANAGEMENT PROCEDURE Bilateral 2022    Lumbar Facet Medial branch block Lumbar 4-5, 5-Sacral 1 bilateral performed by Da Salter MD at [facility] OR    PILONIDAL CYST DRAINAGE      TONSILLECTOMY      TUBAL LIGATION         Allergies   Allergen Reactions    Ibuprofen Rash    Naproxen Rash        Family History   Problem Relation Age of Onset    Seizures Mother     High Cholesterol Mother     Lung Cancer Father     Arthritis Neg Hx     Asthma Neg Hx     Birth Defects Neg Hx     Cancer Neg Hx     Depression Neg Hx     Diabetes Neg Hx     Early Death Neg Hx     Hearing Loss Neg Hx     Heart Disease Neg Hx     High Blood Pressure Neg Hx     Kidney Disease Neg Hx

## 2024-05-04 ENCOUNTER — HOSPITAL ENCOUNTER (EMERGENCY)
Age: 54
Discharge: HOME | End: 2024-05-04
Payer: MEDICAID

## 2024-05-04 VITALS
TEMPERATURE: 97.7 F | OXYGEN SATURATION: 95 % | DIASTOLIC BLOOD PRESSURE: 55 MMHG | WEIGHT: 171 LBS | BODY MASS INDEX: 34.47 KG/M2 | RESPIRATION RATE: 16 BRPM | SYSTOLIC BLOOD PRESSURE: 115 MMHG | HEART RATE: 70 BPM | HEIGHT: 59 IN

## 2024-05-04 DIAGNOSIS — K02.9 DENTAL DECAY: ICD-10-CM

## 2024-05-04 DIAGNOSIS — R22.0 FACIAL SWELLING: Primary | ICD-10-CM

## 2024-05-04 PROCEDURE — 99283 EMERGENCY DEPT VISIT LOW MDM: CPT

## 2024-05-04 RX ORDER — AMOXICILLIN AND CLAVULANATE POTASSIUM 875; 125 MG/1; MG/1
1 TABLET, FILM COATED ORAL 2 TIMES DAILY
Qty: 14 TABLET | Refills: 0 | Status: SHIPPED | OUTPATIENT
Start: 2024-05-04 | End: 2024-05-11

## 2024-05-04 ASSESSMENT — PAIN SCALES - GENERAL: PAINLEVEL_OUTOF10: 5

## 2024-05-04 ASSESSMENT — PAIN DESCRIPTION - ORIENTATION: ORIENTATION: LEFT

## 2024-05-04 ASSESSMENT — PAIN - FUNCTIONAL ASSESSMENT: PAIN_FUNCTIONAL_ASSESSMENT: 0-10

## 2024-05-04 ASSESSMENT — PAIN DESCRIPTION - LOCATION: LOCATION: FACE

## 2024-05-04 ASSESSMENT — PAIN DESCRIPTION - DESCRIPTORS: DESCRIPTORS: THROBBING

## 2024-05-04 ASSESSMENT — PAIN DESCRIPTION - PAIN TYPE: TYPE: ACUTE PAIN

## 2024-05-04 NOTE — ED TRIAGE NOTES
Patient presents to ER with complaints of left side facial swelling and left upper dental problem that started this morning. Patient reports having 2 teeth that were broken during intubation.

## 2024-05-04 NOTE — ED PROVIDER NOTES
[facility] EMERGENCY DEPT      EMERGENCY MEDICINE     Pt Name: Clair Romero  MRN: [id number]  Birthdate 1970  Date of evaluation: 5/4/2024  Provider: LUCAS Nobles    CHIEF COMPLAINT       Chief Complaint   Patient presents with    Facial Swelling     left    Dental Problem     Left upper     HISTORY OF PRESENT ILLNESS   Clair Romero is a pleasant 53 y.o. female who presents to the emergency department from from home, by private vehicle for evaluation of complains of left-sided facial swelling is been going on since today.  The patient states that she had some teeth of recurring intubation years ago.  She does not have any dental pain.  Has had some facial swelling.  She does have some swelling at the gumline..        PASTMEDICAL HISTORY     Past Medical History:   Diagnosis Date    Abnormal Pap smear of cervix     Arthritis     CAD (coronary artery disease)     Chronic back pain     Chronic kidney disease     ddd     through out whole back    Endometriosis     Hip pain     Hyperlipidemia     MRSA (methicillin resistant staph aureus) culture positive 05/21/2014    urine    Obesity 04/16/2015    Pneumonia        Patient Active Problem List   Diagnosis Code    Pain in right hip M25.551    Obesity E66.9    Spinal stenosis, cervical region M48.02    Spinal stenosis, lumbar region without neurogenic claudication M48.061    SOB (shortness of breath) on exertion R06.02    Dizziness on standing better R42    Family history of premature CAD Z82.49     Bilateral leg edema +1 R60.0    Abnormal EKG R94.31    GERD (gastroesophageal reflux disease) K21.9    S/P cardiac cath Severe LEFT MAIN , LAD AND LCX STENOSIS, EDP 24, ANTEROLATERAL ANEURYSM NOTED ef 50- NEED cabg Z98.890    Dyslipidemia E78.5    CAD, multiple vessel I25.10    Coronary artery disease involving native coronary artery of native heart without angina pectoris I25.10    Chest wall pain and chest tightness R07.89    Pre-op evaluation lumbar fascet

## 2024-05-13 ENCOUNTER — TELEPHONE (OUTPATIENT)
Dept: PHYSICAL MEDICINE AND REHAB | Age: 54
End: 2024-05-13

## 2024-05-13 RX ORDER — ASPIRIN 325 MG
325 TABLET, DELAYED RELEASE (ENTERIC COATED) ORAL DAILY
Qty: 90 TABLET | Refills: 1 | Status: SHIPPED | OUTPATIENT
Start: 2024-05-13

## 2024-05-13 NOTE — TELEPHONE ENCOUNTER
05/14/24 Lumbar Facet Medial branch block Lumbar 4-5, 5-Sacral 1 bilateral. Patient did not hold ASA 81. Moved to 5/21/24.

## 2024-05-18 PROBLEM — Z01.818 PRE-OP EVALUATION: Status: RESOLVED | Noted: 2024-04-18 | Resolved: 2024-05-18

## 2024-07-01 RX ORDER — ATORVASTATIN CALCIUM 40 MG/1
40 TABLET, FILM COATED ORAL NIGHTLY
Qty: 90 TABLET | Refills: 0 | Status: SHIPPED | OUTPATIENT
Start: 2024-07-01

## 2024-07-30 RX ORDER — ASPIRIN 325 MG
325 TABLET, DELAYED RELEASE (ENTERIC COATED) ORAL DAILY
Qty: 90 TABLET | Refills: 0 | Status: SHIPPED | OUTPATIENT
Start: 2024-07-30

## 2024-07-30 RX ORDER — ASPIRIN 325 MG
325 TABLET, DELAYED RELEASE (ENTERIC COATED) ORAL DAILY
Qty: 90 TABLET | Refills: 1 | Status: SHIPPED | OUTPATIENT
Start: 2024-07-30

## 2024-08-26 RX ORDER — METOPROLOL TARTRATE 25 MG/1
25 TABLET, FILM COATED ORAL 2 TIMES DAILY
Qty: 60 TABLET | Refills: 0 | Status: SHIPPED | OUTPATIENT
Start: 2024-08-26

## 2024-09-18 ENCOUNTER — HOSPITAL ENCOUNTER (OUTPATIENT)
Age: 54
Discharge: HOME | End: 2024-09-18
Payer: MEDICAID

## 2024-09-18 ENCOUNTER — OFFICE VISIT (OUTPATIENT)
Dept: CARDIOLOGY CLINIC | Age: 54
End: 2024-09-18
Payer: MEDICAID

## 2024-09-18 VITALS
WEIGHT: 177.8 LBS | HEART RATE: 60 BPM | HEIGHT: 59 IN | BODY MASS INDEX: 35.84 KG/M2 | SYSTOLIC BLOOD PRESSURE: 112 MMHG | DIASTOLIC BLOOD PRESSURE: 78 MMHG

## 2024-09-18 DIAGNOSIS — R06.02 SOB (SHORTNESS OF BREATH) ON EXERTION: ICD-10-CM

## 2024-09-18 DIAGNOSIS — R07.89 CHEST WALL PAIN: ICD-10-CM

## 2024-09-18 DIAGNOSIS — I25.10 CORONARY ARTERY DISEASE INVOLVING NATIVE CORONARY ARTERY OF NATIVE HEART WITHOUT ANGINA PECTORIS: ICD-10-CM

## 2024-09-18 DIAGNOSIS — R42 DIZZINESS ON STANDING: ICD-10-CM

## 2024-09-18 DIAGNOSIS — R60.0 BILATERAL LEG EDEMA: ICD-10-CM

## 2024-09-18 DIAGNOSIS — Z91.148 NONCOMPLIANCE WITH MEDICATION REGIMEN: ICD-10-CM

## 2024-09-18 DIAGNOSIS — I25.10 CORONARY ARTERY DISEASE INVOLVING NATIVE CORONARY ARTERY OF NATIVE HEART, UNSPECIFIED WHETHER ANGINA PRESENT: Primary | ICD-10-CM

## 2024-09-18 DIAGNOSIS — E78.5 DYSLIPIDEMIA: ICD-10-CM

## 2024-09-18 DIAGNOSIS — Z01.818 PRE-OP EVALUATION: ICD-10-CM

## 2024-09-18 DIAGNOSIS — R07.89 CHEST TIGHTNESS: ICD-10-CM

## 2024-09-18 DIAGNOSIS — Z98.890 S/P CARDIAC CATH: ICD-10-CM

## 2024-09-18 PROBLEM — Z95.1 S/P CABG X 3: Status: ACTIVE | Noted: 2024-09-18

## 2024-09-18 LAB
ANION GAP SERPL CALC-SCNC: 16 MEQ/L (ref 8–16)
BUN SERPL-MCNC: 10 MG/DL (ref 7–22)
CALCIUM SERPL-MCNC: 9.7 MG/DL (ref 8.5–10.5)
CHLORIDE SERPL-SCNC: 104 MEQ/L (ref 98–111)
CO2 SERPL-SCNC: 23 MEQ/L (ref 23–33)
CREAT SERPL-MCNC: 0.8 MG/DL (ref 0.4–1.2)
GFR SERPL CREATININE-BSD FRML MDRD: 87 ML/MIN/1.73M2
GLUCOSE SERPL-MCNC: 91 MG/DL (ref 70–108)
MAGNESIUM SERPL-MCNC: 2 MG/DL (ref 1.6–2.4)
POTASSIUM SERPL-SCNC: 4.6 MEQ/L (ref 3.5–5.2)
SODIUM SERPL-SCNC: 143 MEQ/L (ref 135–145)

## 2024-09-18 PROCEDURE — 3017F COLORECTAL CA SCREEN DOC REV: CPT | Performed by: INTERNAL MEDICINE

## 2024-09-18 PROCEDURE — G8417 CALC BMI ABV UP PARAM F/U: HCPCS | Performed by: INTERNAL MEDICINE

## 2024-09-18 PROCEDURE — 99214 OFFICE O/P EST MOD 30 MIN: CPT | Performed by: INTERNAL MEDICINE

## 2024-09-18 PROCEDURE — 36415 COLL VENOUS BLD VENIPUNCTURE: CPT

## 2024-09-18 PROCEDURE — 93000 ELECTROCARDIOGRAM COMPLETE: CPT | Performed by: INTERNAL MEDICINE

## 2024-09-18 PROCEDURE — 83735 ASSAY OF MAGNESIUM: CPT

## 2024-09-18 PROCEDURE — 80048 BASIC METABOLIC PNL TOTAL CA: CPT

## 2024-09-18 PROCEDURE — G8427 DOCREV CUR MEDS BY ELIG CLIN: HCPCS | Performed by: INTERNAL MEDICINE

## 2024-09-18 PROCEDURE — 1036F TOBACCO NON-USER: CPT | Performed by: INTERNAL MEDICINE

## 2024-09-19 ENCOUNTER — TELEPHONE (OUTPATIENT)
Dept: CARDIOLOGY CLINIC | Age: 54
End: 2024-09-19

## 2024-09-19 NOTE — TELEPHONE ENCOUNTER
Dr. Marcum wants patient to have lipid and hepatic done that was ordered on 12-20-23.  Patient states she will fast tonight and have done tomorrow morning at [facility].  Please track results.

## 2024-09-23 ENCOUNTER — HOSPITAL ENCOUNTER (OUTPATIENT)
Age: 54
Discharge: HOME | End: 2024-09-23
Payer: MEDICAID

## 2024-09-23 DIAGNOSIS — R60.0 BILATERAL LEG EDEMA: ICD-10-CM

## 2024-09-23 DIAGNOSIS — E78.5 DYSLIPIDEMIA: ICD-10-CM

## 2024-09-23 DIAGNOSIS — Z72.0 TOBACCO ABUSE: ICD-10-CM

## 2024-09-23 DIAGNOSIS — Z98.890 S/P CARDIAC CATH: ICD-10-CM

## 2024-09-23 DIAGNOSIS — R94.31 ABNORMAL EKG: ICD-10-CM

## 2024-09-23 DIAGNOSIS — R07.89 CHEST WALL PAIN: ICD-10-CM

## 2024-09-23 DIAGNOSIS — R06.02 SOB (SHORTNESS OF BREATH) ON EXERTION: ICD-10-CM

## 2024-09-23 DIAGNOSIS — I25.10 CORONARY ARTERY DISEASE INVOLVING NATIVE CORONARY ARTERY OF NATIVE HEART WITHOUT ANGINA PECTORIS: ICD-10-CM

## 2024-09-23 LAB
ALBUMIN SERPL BCG-MCNC: 4 G/DL (ref 3.5–5.1)
ALP SERPL-CCNC: 84 U/L (ref 38–126)
ALT SERPL W/O P-5'-P-CCNC: 20 U/L (ref 11–66)
AST SERPL-CCNC: 16 U/L (ref 5–40)
BILIRUB CONJ SERPL-MCNC: < 0.1 MG/DL (ref 0.1–13.8)
BILIRUB SERPL-MCNC: 0.4 MG/DL (ref 0.3–1.2)
CHOLEST SERPL-MCNC: 139 MG/DL (ref 100–199)
HDLC SERPL-MCNC: 32 MG/DL
LDLC SERPL CALC-MCNC: 64 MG/DL
PROT SERPL-MCNC: 7.1 G/DL (ref 6.1–8)
TRIGL SERPL-MCNC: 216 MG/DL (ref 0–199)

## 2024-09-23 PROCEDURE — 36415 COLL VENOUS BLD VENIPUNCTURE: CPT

## 2024-09-23 PROCEDURE — 80076 HEPATIC FUNCTION PANEL: CPT

## 2024-09-23 PROCEDURE — 80061 LIPID PANEL: CPT

## 2024-09-30 RX ORDER — METOPROLOL TARTRATE 25 MG/1
25 TABLET, FILM COATED ORAL 2 TIMES DAILY
Qty: 60 TABLET | Refills: 5 | Status: SHIPPED | OUTPATIENT
Start: 2024-09-30

## 2024-10-02 ENCOUNTER — HOSPITAL ENCOUNTER (OUTPATIENT)
Age: 54
Discharge: HOME | End: 2024-10-04
Attending: INTERNAL MEDICINE
Payer: MEDICAID

## 2024-10-02 ENCOUNTER — HOSPITAL ENCOUNTER (OUTPATIENT)
Dept: NUCLEAR MEDICINE | Age: 54
Discharge: HOME | End: 2024-10-02
Attending: INTERNAL MEDICINE
Payer: MEDICAID

## 2024-10-02 VITALS
WEIGHT: 178.57 LBS | HEIGHT: 59 IN | BODY MASS INDEX: 36 KG/M2 | DIASTOLIC BLOOD PRESSURE: 78 MMHG | SYSTOLIC BLOOD PRESSURE: 112 MMHG

## 2024-10-02 DIAGNOSIS — E78.5 DYSLIPIDEMIA: ICD-10-CM

## 2024-10-02 DIAGNOSIS — R94.31 ABNORMAL EKG: ICD-10-CM

## 2024-10-02 DIAGNOSIS — Z72.0 TOBACCO ABUSE: ICD-10-CM

## 2024-10-02 DIAGNOSIS — R60.0 BILATERAL LEG EDEMA: ICD-10-CM

## 2024-10-02 DIAGNOSIS — Z98.890 S/P CARDIAC CATH: ICD-10-CM

## 2024-10-02 DIAGNOSIS — I25.10 CORONARY ARTERY DISEASE INVOLVING NATIVE CORONARY ARTERY OF NATIVE HEART WITHOUT ANGINA PECTORIS: ICD-10-CM

## 2024-10-02 DIAGNOSIS — R07.89 CHEST WALL PAIN: ICD-10-CM

## 2024-10-02 DIAGNOSIS — R06.02 SOB (SHORTNESS OF BREATH) ON EXERTION: ICD-10-CM

## 2024-10-02 LAB
ECHO AV CUSP MM: 1.8 CM
ECHO AV MEAN GRADIENT: 4 MMHG
ECHO AV MEAN VELOCITY: 1 M/S
ECHO AV PEAK GRADIENT: 8 MMHG
ECHO AV PEAK VELOCITY: 1.4 M/S
ECHO AV VELOCITY RATIO: 0.71
ECHO AV VTI: 31.4 CM
ECHO BSA: 1.84 M2
ECHO BSA: 1.84 M2
ECHO EST RA PRESSURE: 5 MMHG
ECHO LA AREA 2C: 14.7 CM2
ECHO LA AREA 4C: 14.7 CM2
ECHO LA DIAMETER INDEX: 1.93 CM/M2
ECHO LA DIAMETER: 3.4 CM
ECHO LA MAJOR AXIS: 5.2 CM
ECHO LA MINOR AXIS: 4.9 CM
ECHO LA VOL BP: 35 ML (ref 22–52)
ECHO LA VOL MOD A2C: 35 ML (ref 22–52)
ECHO LA VOL MOD A4C: 34 ML (ref 22–52)
ECHO LA VOL/BSA BIPLANE: 20 ML/M2 (ref 16–34)
ECHO LA VOLUME INDEX MOD A2C: 20 ML/M2 (ref 16–34)
ECHO LA VOLUME INDEX MOD A4C: 19 ML/M2 (ref 16–34)
ECHO LV E' LATERAL VELOCITY: 11.4 CM/S
ECHO LV E' SEPTAL VELOCITY: 7.1 CM/S
ECHO LV EJECTION FRACTION BIPLANE: 60 % (ref 55–100)
ECHO LV FRACTIONAL SHORTENING: 31 % (ref 28–44)
ECHO LV INTERNAL DIMENSION DIASTOLE INDEX: 1.99 CM/M2
ECHO LV INTERNAL DIMENSION DIASTOLIC: 3.5 CM (ref 3.9–5.3)
ECHO LV INTERNAL DIMENSION SYSTOLIC INDEX: 1.36 CM/M2
ECHO LV INTERNAL DIMENSION SYSTOLIC: 2.4 CM
ECHO LV ISOVOLUMETRIC RELAXATION TIME (IVRT): 85 MS
ECHO LV IVSD: 0.9 CM (ref 0.6–0.9)
ECHO LV MASS 2D: 88.8 G (ref 67–162)
ECHO LV MASS INDEX 2D: 50.5 G/M2 (ref 43–95)
ECHO LV POSTERIOR WALL DIASTOLIC: 0.9 CM (ref 0.6–0.9)
ECHO LV RELATIVE WALL THICKNESS RATIO: 0.51
ECHO LVOT AV VTI INDEX: 0.77
ECHO LVOT MEAN GRADIENT: 2 MMHG
ECHO LVOT PEAK GRADIENT: 4 MMHG
ECHO LVOT PEAK VELOCITY: 1 M/S
ECHO LVOT VTI: 24.3 CM
ECHO MV A VELOCITY: 0.66 M/S
ECHO MV E DECELERATION TIME (DT): 210 MS
ECHO MV E VELOCITY: 0.92 M/S
ECHO MV E/A RATIO: 1.39
ECHO MV E/E' LATERAL: 8.07
ECHO MV E/E' RATIO (AVERAGED): 10.51
ECHO MV E/E' SEPTAL: 12.96
ECHO MV REGURGITANT PEAK GRADIENT: 67 MMHG
ECHO MV REGURGITANT PEAK VELOCITY: 4.1 M/S
ECHO PV MAX VELOCITY: 0.8 M/S
ECHO PV PEAK GRADIENT: 3 MMHG
ECHO RIGHT VENTRICULAR SYSTOLIC PRESSURE (RVSP): 32 MMHG
ECHO RV FREE WALL PEAK S': 7.3 CM/S
ECHO RV INTERNAL DIMENSION: 2.9 CM
ECHO RV TAPSE: 1.7 CM (ref 1.7–?)
ECHO TV E WAVE: 0.5 M/S
ECHO TV REGURGITANT MAX VELOCITY: 2.58 M/S
ECHO TV REGURGITANT PEAK GRADIENT: 27 MMHG
NUC STRESS EJECTION FRACTION: 70 %
STRESS BASELINE DIAS BP: 54 MMHG
STRESS BASELINE HR: 65 BPM
STRESS BASELINE ST DEPRESSION: 0 MM
STRESS BASELINE SYS BP: 102 MMHG
STRESS ST DEPRESSION: 0 MM
STRESS STAGE 1 BP: NORMAL MMHG
STRESS STAGE 1 DURATION: 1 MIN:SEC
STRESS STAGE 1 HR: 93 BPM
STRESS STAGE 2 BP: NORMAL MMHG
STRESS STAGE 2 DURATION: 1 MIN:SEC
STRESS STAGE 2 HR: 94 BPM
STRESS STAGE 3 BP: NORMAL MMHG
STRESS STAGE 3 DURATION: 1 MIN:SEC
STRESS STAGE 3 HR: 94 BPM
STRESS STAGE RECOVERY 1 BP: NORMAL MMHG
STRESS STAGE RECOVERY 1 DURATION: 1 MIN:SEC
STRESS STAGE RECOVERY 1 HR: 86 BPM
STRESS STAGE RECOVERY 2 BP: NORMAL MMHG
STRESS STAGE RECOVERY 2 DURATION: 1 MIN:SEC
STRESS STAGE RECOVERY 2 HR: 85 BPM
STRESS STAGE RECOVERY 3 BP: NORMAL MMHG
STRESS STAGE RECOVERY 3 DURATION: 1 MIN:SEC
STRESS STAGE RECOVERY 3 HR: 81 BPM
STRESS STAGE RECOVERY 4 BP: NORMAL MMHG
STRESS STAGE RECOVERY 4 DURATION: 1 MIN:SEC
STRESS STAGE RECOVERY 4 HR: 79 BPM
STRESS TARGET HR: 166 BPM
TID: 1.12

## 2024-10-02 PROCEDURE — 3430000000 HC RX DIAGNOSTIC RADIOPHARMACEUTICAL: Performed by: INTERNAL MEDICINE

## 2024-10-02 PROCEDURE — 6360000002 HC RX W HCPCS: Performed by: INTERNAL MEDICINE

## 2024-10-02 PROCEDURE — A9500 TC99M SESTAMIBI: HCPCS | Performed by: INTERNAL MEDICINE

## 2024-10-02 PROCEDURE — 78452 HT MUSCLE IMAGE SPECT MULT: CPT

## 2024-10-02 PROCEDURE — 93017 CV STRESS TEST TRACING ONLY: CPT

## 2024-10-02 PROCEDURE — 93306 TTE W/DOPPLER COMPLETE: CPT

## 2024-10-02 RX ORDER — TETRAKIS(2-METHOXYISOBUTYLISOCYANIDE)COPPER(I) TETRAFLUOROBORATE 1 MG/ML
10.4 INJECTION, POWDER, LYOPHILIZED, FOR SOLUTION INTRAVENOUS
Status: COMPLETED | OUTPATIENT
Start: 2024-10-02 | End: 2024-10-02

## 2024-10-02 RX ORDER — REGADENOSON 0.08 MG/ML
0.4 INJECTION, SOLUTION INTRAVENOUS
Status: COMPLETED | OUTPATIENT
Start: 2024-10-02 | End: 2024-10-02

## 2024-10-02 RX ORDER — TETRAKIS(2-METHOXYISOBUTYLISOCYANIDE)COPPER(I) TETRAFLUOROBORATE 1 MG/ML
34.1 INJECTION, POWDER, LYOPHILIZED, FOR SOLUTION INTRAVENOUS
Status: COMPLETED | OUTPATIENT
Start: 2024-10-02 | End: 2024-10-02

## 2024-10-02 RX ADMIN — Medication 34.1 MILLICURIE: at 11:03

## 2024-10-02 RX ADMIN — REGADENOSON 0.4 MG: 0.08 INJECTION, SOLUTION INTRAVENOUS at 11:03

## 2024-10-02 RX ADMIN — Medication 10.4 MILLICURIE: at 10:07

## 2024-10-28 ENCOUNTER — TELEPHONE (OUTPATIENT)
Dept: PULMONOLOGY | Age: 54
End: 2024-10-28

## 2024-10-28 DIAGNOSIS — Z87.891 PERSONAL HISTORY OF TOBACCO USE, PRESENTING HAZARDS TO HEALTH: Primary | ICD-10-CM

## 2024-10-28 DIAGNOSIS — R91.1 NODULE OF LOWER LOBE OF RIGHT LUNG: ICD-10-CM

## 2024-10-28 NOTE — TELEPHONE ENCOUNTER
PFT lab called In asking If we could place a new order for the patients francine he is having on 14.. the order is . Thank you

## 2024-10-29 ENCOUNTER — TELEPHONE (OUTPATIENT)
Dept: PULMONOLOGY | Age: 54
End: 2024-10-29

## 2024-10-29 DIAGNOSIS — R91.1 INCIDENTAL LUNG NODULE, GREATER THAN OR EQUAL TO 8MM: ICD-10-CM

## 2024-10-29 DIAGNOSIS — R91.1 NODULE OF LOWER LOBE OF RIGHT LUNG: Primary | ICD-10-CM

## 2024-10-29 DIAGNOSIS — Z87.891 PERSONAL HISTORY OF TOBACCO USE, PRESENTING HAZARDS TO HEALTH: ICD-10-CM

## 2024-11-11 RX ORDER — ASPIRIN 325 MG
325 TABLET, DELAYED RELEASE (ENTERIC COATED) ORAL DAILY
Qty: 90 TABLET | Refills: 2 | Status: SHIPPED | OUTPATIENT
Start: 2024-11-11

## 2025-02-10 RX ORDER — ASPIRIN 325 MG
325 TABLET, DELAYED RELEASE (ENTERIC COATED) ORAL DAILY
Qty: 90 TABLET | Refills: 2 | OUTPATIENT
Start: 2025-02-10

## 2025-02-17 RX ORDER — ATORVASTATIN CALCIUM 40 MG/1
40 TABLET, FILM COATED ORAL NIGHTLY
Qty: 90 TABLET | Refills: 3 | Status: SHIPPED | OUTPATIENT
Start: 2025-02-17

## 2025-04-07 RX ORDER — METOPROLOL TARTRATE 25 MG/1
25 TABLET, FILM COATED ORAL 2 TIMES DAILY
Qty: 60 TABLET | Refills: 2 | Status: SHIPPED | OUTPATIENT
Start: 2025-04-07

## 2025-04-17 ENCOUNTER — OFFICE VISIT (OUTPATIENT)
Dept: CARDIOLOGY CLINIC | Age: 55
End: 2025-04-17
Payer: MEDICAID

## 2025-04-17 VITALS
BODY MASS INDEX: 34.56 KG/M2 | WEIGHT: 171.4 LBS | SYSTOLIC BLOOD PRESSURE: 99 MMHG | HEIGHT: 59 IN | HEART RATE: 57 BPM | DIASTOLIC BLOOD PRESSURE: 70 MMHG

## 2025-04-17 DIAGNOSIS — I25.10 CORONARY ARTERY DISEASE INVOLVING NATIVE CORONARY ARTERY OF NATIVE HEART WITHOUT ANGINA PECTORIS: Primary | ICD-10-CM

## 2025-04-17 DIAGNOSIS — Z98.890 S/P CARDIAC CATH: ICD-10-CM

## 2025-04-17 DIAGNOSIS — R06.02 SOB (SHORTNESS OF BREATH) ON EXERTION: ICD-10-CM

## 2025-04-17 DIAGNOSIS — E78.5 DYSLIPIDEMIA: ICD-10-CM

## 2025-04-17 DIAGNOSIS — R60.0 BILATERAL LEG EDEMA: ICD-10-CM

## 2025-04-17 DIAGNOSIS — Z95.1 S/P CABG X 3: ICD-10-CM

## 2025-04-17 DIAGNOSIS — R94.31 ABNORMAL EKG: ICD-10-CM

## 2025-04-17 PROBLEM — R07.89 CHEST TIGHTNESS: Status: RESOLVED | Noted: 2024-09-18 | Resolved: 2025-04-17

## 2025-04-17 PROCEDURE — G8427 DOCREV CUR MEDS BY ELIG CLIN: HCPCS | Performed by: INTERNAL MEDICINE

## 2025-04-17 PROCEDURE — 99214 OFFICE O/P EST MOD 30 MIN: CPT | Performed by: INTERNAL MEDICINE

## 2025-04-17 PROCEDURE — 3017F COLORECTAL CA SCREEN DOC REV: CPT | Performed by: INTERNAL MEDICINE

## 2025-04-17 PROCEDURE — 1036F TOBACCO NON-USER: CPT | Performed by: INTERNAL MEDICINE

## 2025-04-17 PROCEDURE — G8417 CALC BMI ABV UP PARAM F/U: HCPCS | Performed by: INTERNAL MEDICINE

## 2025-04-17 RX ORDER — BUPRENORPHINE AND NALOXONE 8; 2 MG/1; MG/1
FILM, SOLUBLE BUCCAL; SUBLINGUAL
COMMUNITY
Start: 2025-04-02

## 2025-04-17 NOTE — PROGRESS NOTES
left-dominant  circulation, with distal left main of 50%, mid LAD 80%, distal  circumflex 80%, and total first diagonal and moderate diffuse disease of  the ramus and moderate-to-severe diffuse disease of the small right  coronary.     Low normal LV systolic function, ejection fraction 50% to 55%, with  markedly elevated LVEDP of 24 mmHg, no transaortic pressure gradient,  and there is a moderate-sized anterolateral aneurysm noted that  coincides with the diagonal occlusion.     Image has been reviewed with Dr. Shabazz and we concur that the patient  will benefit better from coronary artery bypass surgery at least to the  LAD and circumflex circulation.  As there is moderate-to-severe diffuse  disease, a small caliber vessel, apparently, percutaneous intervention  will not be the best option at this level and so I discussed with the  family and the patient.     PLAN AND RECOMMENDATION:  Cardiovascular surgeon has been consulted.   Dr. Dunham has been contacted and Dr. Dunham plans to see the patient in  his office.     She is already on aspirin, start her on statin and low-dose beta  blocker.           TC SOTRO M.D.     D: 04/05/2023 15:34:56          Procedures:  8/18/23 1) Coronary artery bypass grafting x 1, witha reversed greater saphenous aortocoronary vein graft to the left anterior descending artery  2) Endoscopic greater saphenous vein harvest  3) Intraoperative coronary angiography of all distal anastomoses          Ekg 2/27/23  Sinus  Rhythm   Low voltage in precordial leads.    -  Nonspecific T-abnormality.     ABNORMAL           Conclusions      Summary   Normal left ventricle size and systolic function. Ejection fraction was   estimated at 60 %. There were no regional left ventricular wall motion   abnormalities and wall thickness was within normal limits.      Signature      ----------------------------------------------------------------   Electronically signed by Tc Sorto MD (Interpreting

## 2025-05-14 ENCOUNTER — PREP FOR PROCEDURE (OUTPATIENT)
Dept: SURGERY | Age: 55
End: 2025-05-14

## 2025-05-14 ENCOUNTER — HOSPITAL ENCOUNTER (OUTPATIENT)
Dept: WOMENS IMAGING | Age: 55
Discharge: HOME | End: 2025-05-14

## 2025-05-14 ENCOUNTER — OFFICE VISIT (OUTPATIENT)
Dept: SURGERY | Age: 55
End: 2025-05-14
Payer: MEDICAID

## 2025-05-14 ENCOUNTER — TELEPHONE (OUTPATIENT)
Dept: SURGERY | Age: 55
End: 2025-05-14

## 2025-05-14 VITALS
WEIGHT: 174 LBS | HEIGHT: 59 IN | BODY MASS INDEX: 35.08 KG/M2 | SYSTOLIC BLOOD PRESSURE: 98 MMHG | DIASTOLIC BLOOD PRESSURE: 62 MMHG | TEMPERATURE: 97.5 F | HEART RATE: 59 BPM | OXYGEN SATURATION: 95 %

## 2025-05-14 DIAGNOSIS — K43.2 INCISIONAL HERNIA, WITHOUT OBSTRUCTION OR GANGRENE: Primary | ICD-10-CM

## 2025-05-14 DIAGNOSIS — Z01.818 PRE-OP TESTING: ICD-10-CM

## 2025-05-14 DIAGNOSIS — K43.2 VENTRAL INCISIONAL HERNIA: Primary | ICD-10-CM

## 2025-05-14 DIAGNOSIS — K43.6 INCARCERATED VENTRAL HERNIA: ICD-10-CM

## 2025-05-14 DIAGNOSIS — Z12.31 VISIT FOR SCREENING MAMMOGRAM: ICD-10-CM

## 2025-05-14 PROCEDURE — G8427 DOCREV CUR MEDS BY ELIG CLIN: HCPCS | Performed by: SURGERY

## 2025-05-14 PROCEDURE — 99204 OFFICE O/P NEW MOD 45 MIN: CPT | Performed by: SURGERY

## 2025-05-14 PROCEDURE — 1036F TOBACCO NON-USER: CPT | Performed by: SURGERY

## 2025-05-14 PROCEDURE — 3017F COLORECTAL CA SCREEN DOC REV: CPT | Performed by: SURGERY

## 2025-05-14 PROCEDURE — G8417 CALC BMI ABV UP PARAM F/U: HCPCS | Performed by: SURGERY

## 2025-05-14 RX ORDER — SENNOSIDES 8.6 MG
1 TABLET ORAL DAILY PRN
COMMUNITY
Start: 2025-04-02

## 2025-05-14 NOTE — TELEPHONE ENCOUNTER
Pt of Dr. Marcum's last seen 4/17/25 is scheduled for robotic ventral incisional hernia repair on 6/10/25.  Can she be cleared for surgery?  Ok for pt to hold  mg 5 days prior?

## 2025-05-14 NOTE — TELEPHONE ENCOUNTER
Pre op Risk Assessment    Procedure robotic ventral incisional hernia repair  Physician Dr. Barajas   Date of surgery/procedure 6/10/2025    Last OV 04/17/2025  Last Stress 10/2024  Last Echo 10/2024  Last Cath 04/2023  Last Stent ?  Is patient on blood thinners   Hold Meds/how many days ?

## 2025-05-15 ASSESSMENT — ENCOUNTER SYMPTOMS
APNEA: 0
CONSTIPATION: 0
VOICE CHANGE: 0
BLOOD IN STOOL: 0
TROUBLE SWALLOWING: 0
SINUS PRESSURE: 0
SHORTNESS OF BREATH: 0
FACIAL SWELLING: 0
EYE ITCHING: 0
ABDOMINAL DISTENTION: 1
CHEST TIGHTNESS: 0
NAUSEA: 0
EYE PAIN: 0
ABDOMINAL PAIN: 1
VOMITING: 0
SORE THROAT: 0
EYE REDNESS: 0
COLOR CHANGE: 0
RECTAL PAIN: 0
BACK PAIN: 0
STRIDOR: 0
CHOKING: 0
COUGH: 0
RHINORRHEA: 0
ANAL BLEEDING: 0
ALLERGIC/IMMUNOLOGIC NEGATIVE: 1
PHOTOPHOBIA: 0
DIARRHEA: 0
EYE DISCHARGE: 0
WHEEZING: 0

## 2025-05-15 NOTE — PROGRESS NOTES
Clair Romero (:  1970)     ASSESSMENT:  1.  Epigastric incisional/ventral hernia  2.  History of CABG x 3    PLAN:  1. Schedule Clair for robotic possible open repair incisional/ventral epigastric hernia with mesh.  2. She will undergo pre-operative clearance per anesthesia guidelines with risk factors listed under the past medical history diagnosis & problem list.  3. The risks, benefits and alternatives were discussed with Clair including non-operative management.  Robotic, laparoscopic and open techniques were discussed.  Pros and cons of mesh insertion discussed.  All questions answered. She understands and wishes to proceed with surgical intervention.  4. Restrictions discussed with Clair and she expresses understanding.  5. She is advised to call back directly if there are further questions/concerns, or if her symptoms worsen prior to surgery.    SUBJECTIVE/OBJECTIVE:    Chief Complaint   Patient presents with    Surgical Consult     NP ref by Dr. Marcum - Possible  incisional hernia in epigastric region     BENITA Patten is a 54-year-old female presents for initial evaluation secondary to a upper abdominal wall incisional/ventral hernia.  Bulge is still reducible but tender.  Increasing in size.  Mild to moderate discomfort.  No severe pain.  Discomfort worse with increased activity, lifting and bending over.  Worsens throughout the day when she is more active.  Improves with rest and lying down.  Normal bowel function.  No hematochezia or melena.  No new urinary complaints.  Admits she has noticed the bulge over the last 4-6 weeks.  Originally underwent a CABG times 3 August 2023.  Denies any other major abdominal surgeries in the past.  No nausea or vomiting.  Tolerating a diet.  Otherwise doing well.    Review of Systems   Constitutional:  Negative for activity change, appetite change, chills, diaphoresis, fatigue, fever and unexpected weight change.   HENT:  Negative for congestion, dental

## 2025-05-18 SDOH — HEALTH STABILITY: PHYSICAL HEALTH: ON AVERAGE, HOW MANY MINUTES DO YOU ENGAGE IN EXERCISE AT THIS LEVEL?: 60 MIN

## 2025-05-18 SDOH — HEALTH STABILITY: PHYSICAL HEALTH: ON AVERAGE, HOW MANY DAYS PER WEEK DO YOU ENGAGE IN MODERATE TO STRENUOUS EXERCISE (LIKE A BRISK WALK)?: 4 DAYS

## 2025-05-18 NOTE — PROGRESS NOTES
[facility] PROFESSIONAL SERVS  [facility] FAMILY MEDICINE PRACTICE  [address]  [facility] [address]  Dept: [phone]  Dept Fax: [phone]  Loc: [phone]  Resident Note    Patient:Clair Romero Sex: female  YOB: 1970 Age:54 y.o.  MRN: [id number]  Assessment & Plan   1. Well adult exam  - New to this office, establishing care  - Reviewed med list which is accurate    2. Prediabetes  - Chronic. 8/14/23 HgbA1C 6.3  - Will recheck HgbA1C  Orders  - Hemoglobin A1C; Future    3. Positive colorectal cancer screening using Cologuard test  4. Colon cancer screening  - 10/6/23 Cologuard positive. Did not have subsequent colonoscopy or further evaluation per chart review. Family history significant for cancerous polyps in mother  - Referring to GI provider for colonoscopy  Orders  - [facility] - Elizabeth Back MD, Gastroenterology, [place]    5. Encounter for hepatitis C screening test for low risk patient  Orders  - Hepatitis C Antibody; Future    6. Tobacco use  - Currently smokes 1ppd over the past 39 years  - CT Chest previously ordered, still needs completed  Orders  - CBC with Auto Differential; Future    7. Candidal intertrigo  - Shiny, well-demarcated rash noted under right breast with diffuse erythema consistent with candida  - Sending antifgunal cream and recommended cornstarch to keep the area dry  Orders  - ketoconazole (NIZORAL) 2 % cream; Apply topically daily.  Dispense: 30 g; Refill: 1    8. Mass of upper outer quadrant of left breast  - Exam significant for 2cm, firm, tender lesion at 2:00 position in left breast. Suspect cyst though patient is symptomatic so discussed may consider aspiration if indeed cyst and remains symptomatic.  - Checking Breast US and diagnostic mammogram as does have family history of breast cancer in mother and aunt.   Orders  - US BREAST COMPLETE LEFT; Future  - [facility] BORA DIGITAL DIAGNOSTIC BILATERAL; Future    Health

## 2025-05-19 ENCOUNTER — OFFICE VISIT (OUTPATIENT)
Dept: FAMILY MEDICINE CLINIC | Age: 55
End: 2025-05-19

## 2025-05-19 VITALS
HEIGHT: 59 IN | WEIGHT: 173.4 LBS | SYSTOLIC BLOOD PRESSURE: 112 MMHG | OXYGEN SATURATION: 98 % | DIASTOLIC BLOOD PRESSURE: 68 MMHG | BODY MASS INDEX: 34.96 KG/M2 | RESPIRATION RATE: 18 BRPM | HEART RATE: 59 BPM | TEMPERATURE: 97.7 F

## 2025-05-19 DIAGNOSIS — R73.03 PREDIABETES: ICD-10-CM

## 2025-05-19 DIAGNOSIS — R19.5 POSITIVE COLORECTAL CANCER SCREENING USING COLOGUARD TEST: ICD-10-CM

## 2025-05-19 DIAGNOSIS — Z72.0 TOBACCO USE: ICD-10-CM

## 2025-05-19 DIAGNOSIS — N63.21 MASS OF UPPER OUTER QUADRANT OF LEFT BREAST: ICD-10-CM

## 2025-05-19 DIAGNOSIS — B37.2 CANDIDAL INTERTRIGO: ICD-10-CM

## 2025-05-19 DIAGNOSIS — Z11.59 ENCOUNTER FOR HEPATITIS C SCREENING TEST FOR LOW RISK PATIENT: ICD-10-CM

## 2025-05-19 DIAGNOSIS — Z00.00 WELL ADULT EXAM: Primary | ICD-10-CM

## 2025-05-19 DIAGNOSIS — Z12.11 COLON CANCER SCREENING: ICD-10-CM

## 2025-05-19 RX ORDER — KETOCONAZOLE 20 MG/G
CREAM TOPICAL
Qty: 30 G | Refills: 1 | Status: SHIPPED | OUTPATIENT
Start: 2025-05-19

## 2025-05-19 SDOH — ECONOMIC STABILITY: FOOD INSECURITY: WITHIN THE PAST 12 MONTHS, YOU WORRIED THAT YOUR FOOD WOULD RUN OUT BEFORE YOU GOT MONEY TO BUY MORE.: NEVER TRUE

## 2025-05-19 SDOH — ECONOMIC STABILITY: FOOD INSECURITY: WITHIN THE PAST 12 MONTHS, THE FOOD YOU BOUGHT JUST DIDN'T LAST AND YOU DIDN'T HAVE MONEY TO GET MORE.: NEVER TRUE

## 2025-05-19 ASSESSMENT — PATIENT HEALTH QUESTIONNAIRE - PHQ9
SUM OF ALL RESPONSES TO PHQ QUESTIONS 1-9: 2
SUM OF ALL RESPONSES TO PHQ QUESTIONS 1-9: 2
1. LITTLE INTEREST OR PLEASURE IN DOING THINGS: SEVERAL DAYS
2. FEELING DOWN, DEPRESSED OR HOPELESS: SEVERAL DAYS
SUM OF ALL RESPONSES TO PHQ QUESTIONS 1-9: 2
SUM OF ALL RESPONSES TO PHQ QUESTIONS 1-9: 2

## 2025-05-19 ASSESSMENT — ENCOUNTER SYMPTOMS
DIARRHEA: 0
CONSTIPATION: 0
CHEST TIGHTNESS: 1
NAUSEA: 0
VOMITING: 0

## 2025-05-19 NOTE — PROGRESS NOTES
Attending Physician Note    I have seen and evaluated the patient. I discussed the findings, assessment and plan with the resident and agree with the resident's findings and plan as documented in the resident's note.  GC modifier added.    Brief summary:  Wellness - Pap due 2026. See below for other measures. Rec pneumonia vaccine, shingles vaccine, flu vaccine, covid vaccine.  Breast mass - diagnostic mammogram and u/s.   Positive cologuard - refer for cscope.  Tobacco use - overdue for lung ct screening. She will schedule. Will discuss smoking cessation at upcoming visits.  Pre-diabetes - check labs.  Intertrigo - ketoconazole cream and keep area dry.

## 2025-05-19 NOTE — PROGRESS NOTES
Patient:Clair Romero  YOB: 1970   MRN:[id number]    Subjective   54 y.o. female who presents for the following: New Patient (In office today as a new patient establishing care. )    Rosmery Romero is a 54 y.o. female presenting to the clinic today to establish care. Patient's former PCP was at the [address] [place] [address]. She presents today with the acute concern of a lump in her left breast. She first discovered the lump on the 12th. She had a mammogram scheduled on the 13th and was unable to receive her mammogram because of the lump. The lump is in the upper outer quadrant and is about 1 cm across. It is painful and the pain radiates into her armpit. The skin is not red or hot. It hurts to lay on either side because of the lump.   The secondary acute concern the patient presents with today is a rash beneath her breast. It is located under the right breast. It is well-demarcated and has diffuse erythema. Patient states the rash is itchy, sometimes painful, and \"smells bad.\" It is recurrent and usually lasts a few days. Normally, the patient puts neosporin on it and it goes away, but always comes back. Sometimes, she gets a similar rash in her right inguinal crease.   Patient had a positive cologuard result in 2023 with no follow-up. Patient last had A1C checked in 2023. Patient has smoked a pack a day for 39 years.      Review of Systems   Constitutional:  Positive for diaphoresis (menopause). Negative for chills and fever.   Respiratory:  Positive for chest tightness (history of CAD, hernia causes chest tightness).    Cardiovascular:  Positive for palpitations (history of CAD, says they are common). Chest pain: history of CAD, hernia causes chest tightness.  Neurological:  Negative for headaches.     PMHx: She has a past medical history of Abnormal Pap smear of cervix, Arthritis, CAD (coronary artery disease), Chronic back pain, Chronic kidney disease, ddd, Endometriosis, Hip pain, Hyperlipidemia, MRSA

## 2025-05-19 NOTE — PATIENT INSTRUCTIONS
Call central scheduling to schedule US of breast and mammogram and CT chest.     Use antifungal cream under breasts when rash develops. Keep the area dry using cornstarch.     Get labwork done at the same time as Dr. Marcum and Dr. Barajas's labs.

## 2025-05-21 ENCOUNTER — RESULTS FOLLOW-UP (OUTPATIENT)
Dept: FAMILY MEDICINE CLINIC | Age: 55
End: 2025-05-21

## 2025-05-21 ENCOUNTER — HOSPITAL ENCOUNTER (OUTPATIENT)
Dept: WOMENS IMAGING | Age: 55
Discharge: HOME | End: 2025-05-21
Payer: MEDICAID

## 2025-05-21 ENCOUNTER — HOSPITAL ENCOUNTER (OUTPATIENT)
Age: 55
Discharge: HOME | End: 2025-05-21
Payer: MEDICAID

## 2025-05-21 VITALS — WEIGHT: 173 LBS | BODY MASS INDEX: 34.88 KG/M2 | HEIGHT: 59 IN

## 2025-05-21 DIAGNOSIS — K43.2 VENTRAL INCISIONAL HERNIA: ICD-10-CM

## 2025-05-21 DIAGNOSIS — Z98.890 S/P CARDIAC CATH: ICD-10-CM

## 2025-05-21 DIAGNOSIS — R60.0 BILATERAL LEG EDEMA: ICD-10-CM

## 2025-05-21 DIAGNOSIS — R73.03 PREDIABETES: ICD-10-CM

## 2025-05-21 DIAGNOSIS — I25.10 CORONARY ARTERY DISEASE INVOLVING NATIVE CORONARY ARTERY OF NATIVE HEART WITHOUT ANGINA PECTORIS: ICD-10-CM

## 2025-05-21 DIAGNOSIS — R94.31 ABNORMAL EKG: ICD-10-CM

## 2025-05-21 DIAGNOSIS — Z95.1 S/P CABG X 3: ICD-10-CM

## 2025-05-21 DIAGNOSIS — R06.02 SOB (SHORTNESS OF BREATH) ON EXERTION: ICD-10-CM

## 2025-05-21 DIAGNOSIS — Z72.0 TOBACCO USE: ICD-10-CM

## 2025-05-21 DIAGNOSIS — E78.5 DYSLIPIDEMIA: ICD-10-CM

## 2025-05-21 DIAGNOSIS — Z01.818 PRE-OP TESTING: ICD-10-CM

## 2025-05-21 DIAGNOSIS — N63.21 MASS OF UPPER OUTER QUADRANT OF LEFT BREAST: ICD-10-CM

## 2025-05-21 DIAGNOSIS — Z11.59 ENCOUNTER FOR HEPATITIS C SCREENING TEST FOR LOW RISK PATIENT: ICD-10-CM

## 2025-05-21 LAB
ALBUMIN SERPL BCG-MCNC: 3.9 G/DL (ref 3.4–4.9)
ALP SERPL-CCNC: 94 U/L (ref 38–126)
ALT SERPL W/O P-5'-P-CCNC: 13 U/L (ref 10–35)
ANION GAP SERPL CALC-SCNC: 12 MEQ/L (ref 8–16)
AST SERPL-CCNC: 19 U/L (ref 10–35)
BASOPHILS ABSOLUTE: 0.1 THOU/MM3 (ref 0–0.1)
BASOPHILS NFR BLD AUTO: 1 %
BILIRUB CONJ SERPL-MCNC: 0.2 MG/DL (ref 0–0.2)
BILIRUB SERPL-MCNC: 0.4 MG/DL (ref 0.3–1.2)
BUN SERPL-MCNC: 14 MG/DL (ref 8–23)
CALCIUM SERPL-MCNC: 9.5 MG/DL (ref 8.6–10)
CHLORIDE SERPL-SCNC: 105 MEQ/L (ref 98–111)
CHOLEST SERPL-MCNC: 134 MG/DL (ref 100–199)
CO2 SERPL-SCNC: 25 MEQ/L (ref 22–29)
CREAT SERPL-MCNC: 1 MG/DL (ref 0.5–0.9)
DEPRECATED MEAN GLUCOSE BLD GHB EST-ACNC: 126 MG/DL (ref 70–126)
DEPRECATED RDW RBC AUTO: 39.4 FL (ref 35–45)
EOSINOPHIL NFR BLD AUTO: 2.2 %
EOSINOPHILS ABSOLUTE: 0.2 THOU/MM3 (ref 0–0.4)
ERYTHROCYTE [DISTWIDTH] IN BLOOD BY AUTOMATED COUNT: 12.2 % (ref 11.5–14.5)
GFR SERPL CREATININE-BSD FRML MDRD: 67 ML/MIN/1.73M2
GLUCOSE SERPL-MCNC: 95 MG/DL (ref 74–109)
HBA1C MFR BLD HPLC: 6.2 % (ref 4–6)
HCT VFR BLD AUTO: 42.3 % (ref 37–47)
HCT VFR BLD AUTO: 42.9 % (ref 37–47)
HCV IGG SERPL QL IA: NONREACTIVE
HDLC SERPL-MCNC: 27 MG/DL
HGB BLD-MCNC: 14.2 GM/DL (ref 12–16)
HGB BLD-MCNC: 14.2 GM/DL (ref 12–16)
IMM GRANULOCYTES # BLD AUTO: 0.03 THOU/MM3 (ref 0–0.07)
IMM GRANULOCYTES NFR BLD AUTO: 0.4 %
LDLC SERPL CALC-MCNC: 75 MG/DL
LYMPHOCYTES ABSOLUTE: 2.3 THOU/MM3 (ref 1–4.8)
LYMPHOCYTES NFR BLD AUTO: 34 %
MAGNESIUM SERPL-MCNC: 2.1 MG/DL (ref 1.6–2.6)
MCH RBC QN AUTO: 29.6 PG (ref 26–33)
MCHC RBC AUTO-ENTMCNC: 33.6 GM/DL (ref 32.2–35.5)
MCV RBC AUTO: 88.3 FL (ref 81–99)
MONOCYTES ABSOLUTE: 0.6 THOU/MM3 (ref 0.4–1.3)
MONOCYTES NFR BLD AUTO: 8 %
NEUTROPHILS ABSOLUTE: 3.8 THOU/MM3 (ref 1.8–7.7)
NEUTROPHILS NFR BLD AUTO: 54.4 %
NRBC BLD AUTO-RTO: 0 /100 WBC
PLATELET # BLD AUTO: 242 THOU/MM3 (ref 130–400)
PMV BLD AUTO: 9 FL (ref 9.4–12.4)
POTASSIUM SERPL-SCNC: 4.8 MEQ/L (ref 3.5–5.2)
PROT SERPL-MCNC: 6.9 G/DL (ref 6.4–8.3)
RBC # BLD AUTO: 4.79 MILL/MM3 (ref 4.2–5.4)
SODIUM SERPL-SCNC: 142 MEQ/L (ref 135–145)
TRIGL SERPL-MCNC: 159 MG/DL (ref 0–199)
WBC # BLD AUTO: 6.9 THOU/MM3 (ref 4.8–10.8)

## 2025-05-21 PROCEDURE — G0279 TOMOSYNTHESIS, MAMMO: HCPCS

## 2025-05-21 PROCEDURE — 36415 COLL VENOUS BLD VENIPUNCTURE: CPT

## 2025-05-21 PROCEDURE — 76642 ULTRASOUND BREAST LIMITED: CPT

## 2025-05-21 PROCEDURE — 85014 HEMATOCRIT: CPT

## 2025-05-21 PROCEDURE — 83735 ASSAY OF MAGNESIUM: CPT

## 2025-05-21 PROCEDURE — 80061 LIPID PANEL: CPT

## 2025-05-21 PROCEDURE — 82248 BILIRUBIN DIRECT: CPT

## 2025-05-21 PROCEDURE — 86803 HEPATITIS C AB TEST: CPT

## 2025-05-21 PROCEDURE — 80053 COMPREHEN METABOLIC PANEL: CPT

## 2025-05-21 PROCEDURE — 85018 HEMOGLOBIN: CPT

## 2025-05-21 PROCEDURE — 85025 COMPLETE CBC W/AUTO DIFF WBC: CPT

## 2025-05-21 PROCEDURE — 83036 HEMOGLOBIN GLYCOSYLATED A1C: CPT

## 2025-05-22 ENCOUNTER — TELEPHONE (OUTPATIENT)
Dept: FAMILY MEDICINE CLINIC | Age: 55
End: 2025-05-22

## 2025-05-22 ENCOUNTER — RESULTS FOLLOW-UP (OUTPATIENT)
Dept: FAMILY MEDICINE CLINIC | Age: 55
End: 2025-05-22

## 2025-05-22 ENCOUNTER — TELEPHONE (OUTPATIENT)
Dept: CARDIOLOGY CLINIC | Age: 55
End: 2025-05-22

## 2025-05-22 NOTE — TELEPHONE ENCOUNTER
Called patient to discuss results of ultrasound and mammogram. Radiology had already discussed with patient and set up biopsy for 5/29/25. She is understandably concerned and nervous but has not other questions at this time.

## 2025-05-22 NOTE — TELEPHONE ENCOUNTER
Pre op Risk Assessment    Procedure Breast Biopsy  Physician   Date of surgery/procedure 5/29/2025    Last OV 4/17/2025  Last Stress 10/2/2024  Last Echo 10/2/2024  Last Cath 4/5/2023  Last Stent Nothing in EPIC  Is patient on blood thinners ASA  Hold Meds/how many days     Notify patient.

## 2025-05-23 ENCOUNTER — HOSPITAL ENCOUNTER (OUTPATIENT)
Dept: CT IMAGING | Age: 55
Discharge: HOME | End: 2025-05-23
Attending: INTERNAL MEDICINE
Payer: MEDICAID

## 2025-05-23 DIAGNOSIS — R91.1 INCIDENTAL LUNG NODULE, GREATER THAN OR EQUAL TO 8MM: ICD-10-CM

## 2025-05-23 DIAGNOSIS — R91.1 NODULE OF LOWER LOBE OF RIGHT LUNG: ICD-10-CM

## 2025-05-23 DIAGNOSIS — Z87.891 PERSONAL HISTORY OF TOBACCO USE, PRESENTING HAZARDS TO HEALTH: ICD-10-CM

## 2025-05-23 PROCEDURE — 71250 CT THORAX DX C-: CPT

## 2025-05-29 ENCOUNTER — HOSPITAL ENCOUNTER (OUTPATIENT)
Dept: WOMENS IMAGING | Age: 55
Discharge: HOME | End: 2025-05-29
Attending: RADIOLOGY
Payer: MEDICAID

## 2025-05-29 DIAGNOSIS — N63.21 MASS OF UPPER OUTER QUADRANT OF LEFT BREAST: ICD-10-CM

## 2025-05-29 DIAGNOSIS — R59.0 ENLARGED LYMPH NODES IN ARMPIT: ICD-10-CM

## 2025-05-29 PROCEDURE — 19083 BX BREAST 1ST LESION US IMAG: CPT

## 2025-05-29 PROCEDURE — G0279 TOMOSYNTHESIS, MAMMO: HCPCS

## 2025-05-29 PROCEDURE — C1894 INTRO/SHEATH, NON-LASER: HCPCS

## 2025-05-29 PROCEDURE — 88305 TISSUE EXAM BY PATHOLOGIST: CPT

## 2025-05-29 PROCEDURE — 88360 TUMOR IMMUNOHISTOCHEM/MANUAL: CPT

## 2025-05-29 NOTE — PROGRESS NOTES
[facility]  Pre-Biopsy Assessment      Patient Education    Written information about procedure Yes  left   Procedural steps explained Yes Ultrasound Biopsy   Post-op potential: bruising, hematoma, pain Yes    Self-care: activity, care of dressing Yes    Patient verbalized understanding Yes    Consent signed and witnessed Yes      Hormone Therapy Status: n/a    Recent Medication: Aspirin Last Dose: 5-24-25                                     Hormone Replacement Therapy: no    Previous Breast Biopsy: no    Previous Diagnosis Cancer: no    Hysterectomy:no    Emotional Status: Calm    Language or Physical Barriers: n/a    Comments: n/a      Electronically signed by Amanda Delarosa on 5/29/2025 at 10:09 AM

## 2025-05-29 NOTE — PROGRESS NOTES
Breast Biopsy Flowsheet/Post-Operative Care    Date of Procedure: 5/29/2025  Physician: Dr. Rizo  Technologist: Andria Hendrickson RT(R)(M)    Biopsy:ultrasound guided breast biopsy X   Lesion type: Palpable  Breast: left    Clock face position: Site #1: UOQ     Site #2: axilla         Primary Method of Detection: Palpation      Microcalcification's: no   Distribution: N/A        Biopsy Method:   Sertera:    Site # 1    Gauge: 14    # of Passes: 4     Clip: Barbell    Sertera:    Site # 2    Gauge: 14    # of Passes: 4     Clip:  Tribell          Pre-Op Assessment: (BI-RADS)   5. Highly Suggestive of Malignancy    Patient Tolerated Procedure: good  Complications: n/a  Comments: n/a    Post Operative Care  Steri strips: Yes  Dressing: Gauze, Tape   Ice Applied to Site:  No  Evidence of Bleeding:  No    Pain Verbalized: Yes  first sample of breast-additional lidocaine given   Had pain on during all axilla samples.      Written Discharge Instructions: Yes  Condition at Discharge: good  Time of Discharge: 1127    Electronically signed by Amanda Delarosa on 5/29/2025 at 11:31 AM

## 2025-05-30 ENCOUNTER — CLINICAL DOCUMENTATION (OUTPATIENT)
Dept: WOMENS IMAGING | Age: 55
End: 2025-05-30

## 2025-05-30 NOTE — PROGRESS NOTES
Contact Type: [facility]    Diagnosis:  Invasive Ductal Carcinoma    Contact Information: Arrived alone for biopsy results. Dr. Solorio joined us while Dr. Rizo discussed pathology and recommended integrated breast clinic.  Encouraged Breast Clinic attendance.  All cards given for surgeons. Wants to see Dr(s): Undecided.      Patient Expresses Need(s) For: n/a    Requests Referral to Doctor(s) with appointment(s): n/a    Additional Referral(s): Jessika Casas/Janis Herrmann: Breast Navigators     Integrated breast cancer clinic appointment: 6-6-25 @ 7:30    Biopsy site status: Axilla a little sore.  Has been using ice.  Small spot of dry blood at steristrip site    Teaching Sheets provided: Cancer Type: Invasive Ductal Carcinoma, What is Breast Cancer, Tumor Markers, Needle Localization, [place] Lymph Node Surgery, Lumpectomy/Mastectomy Comparison, Radiation Therapy, Breast Cancer Navigation Packet, Nurse Navigation contact information, Breast Clinic appointment and map.    Currently on HRT: NO    If yes, was patient instructed to stop immediately: N/A     Notes: Explained terms doctor and navigators will discuss at next appointments. Questions addressed and encouraged patient to ask Breast Navigators. Will receive call Monday. Referral emailed to Navigation.    Answered “yes” on Genetic Risk Assessment: no     Additional information: Best contact number [phone] Any time Monday Rosmery does not drive.  Two sons and one daughter all live out of town.  In contact with them frequently.     Results Faxed to:  n/a Dr. Solorio aware

## 2025-06-02 ENCOUNTER — TELEPHONE (OUTPATIENT)
Dept: SURGERY | Age: 55
End: 2025-06-02

## 2025-06-02 ENCOUNTER — CLINICAL DOCUMENTATION (OUTPATIENT)
Dept: CASE MANAGEMENT | Age: 55
End: 2025-06-02

## 2025-06-02 NOTE — TELEPHONE ENCOUNTER
Pt with newly diagnosed invasive ductal carcinoma left breast.   Discussed new diagnosed with Dr. Barajas who has her scheduled for robotic ventral hernia repair on 6/10. Per Dr. Barajas if pt would like we can do a combined surgery or can cancel hernia repair until tx for breast cancer has been completed.    Spoke w/ pt, she would prefer to cancel surgery for ventral hernia at this time and focus on her breast cancer and treatments.   Pt stated she would like to see Dr. Koenig. Appointment made 6/3 at 3 pm. Pt voiced understanding.

## 2025-06-02 NOTE — PROGRESS NOTES
Name: Clair Romero  : 1970  MRN: [id number]    Oncology Navigation- Initial Note: Teaching    Intake-  Contact Type: Cancer Center-teaching with navigator    Diagnosis: Breast-malignant, Invasive ductal carcinoma, grade 2, ER+ VA+ HER2- left breast. Node bx neg.    Home Disposition: Lives with other who is able to assist, carolann Alex    Patient needs and barriers to care: Coordination of Care and Knowledge deficit, pt does not drive     Referral Source: Outside Provider    Receptive to Advanced Care Planning/ Palliative Care:   NA stage 1A    Interventions-   General Interventions: Called pt to introduce self and explain navigation. Initial teaching on newly diagnosed IDC completed via telephone.      Genetics/Family Hx: Father and maternal uncle with lung cancer, maternal great aunt. Does not meet criteria for genetic testing    Education/Screenings:  yes -  Breast cancer information packet and navigation welcome packet reviewed. Discussed IDC, sentinel lymph node surgery, lumpectomy vs mastectomy, and tumor markers. Will need instructed for localization at the time of the procedure.     Currently on HRT: no     Referrals: Spiritual Care, , Has appointment with Dr. Koenig on 6/3/25 at 3:30     Continuum of Care: Diagnosis/Active Treatment    Notes: Teaching completed, questions addressed, emotional support provided.  Contact information provided and patient encouraged to call with any questions or concerns.  Will follow through continuum of care.    Electronically signed by Jessika Casas RN on 2025 at 11:47 AM

## 2025-06-03 ENCOUNTER — OFFICE VISIT (OUTPATIENT)
Dept: SURGERY | Age: 55
End: 2025-06-03
Payer: MEDICAID

## 2025-06-03 ENCOUNTER — PREP FOR PROCEDURE (OUTPATIENT)
Dept: SURGERY | Age: 55
End: 2025-06-03

## 2025-06-03 VITALS
SYSTOLIC BLOOD PRESSURE: 122 MMHG | DIASTOLIC BLOOD PRESSURE: 64 MMHG | BODY MASS INDEX: 34.72 KG/M2 | WEIGHT: 172.2 LBS | OXYGEN SATURATION: 95 % | HEART RATE: 54 BPM | TEMPERATURE: 97.4 F | HEIGHT: 59 IN

## 2025-06-03 DIAGNOSIS — C50.412 MALIGNANT NEOPLASM OF UPPER-OUTER QUADRANT OF LEFT BREAST IN FEMALE, ESTROGEN RECEPTOR POSITIVE (HCC): Primary | ICD-10-CM

## 2025-06-03 DIAGNOSIS — Z17.0 MALIGNANT NEOPLASM OF UPPER-OUTER QUADRANT OF LEFT BREAST IN FEMALE, ESTROGEN RECEPTOR POSITIVE (HCC): Primary | ICD-10-CM

## 2025-06-03 PROCEDURE — G8427 DOCREV CUR MEDS BY ELIG CLIN: HCPCS | Performed by: SURGERY

## 2025-06-03 PROCEDURE — G8417 CALC BMI ABV UP PARAM F/U: HCPCS | Performed by: SURGERY

## 2025-06-03 PROCEDURE — 99215 OFFICE O/P EST HI 40 MIN: CPT | Performed by: SURGERY

## 2025-06-03 PROCEDURE — 1036F TOBACCO NON-USER: CPT | Performed by: SURGERY

## 2025-06-03 PROCEDURE — 3017F COLORECTAL CA SCREEN DOC REV: CPT | Performed by: SURGERY

## 2025-06-03 ASSESSMENT — ENCOUNTER SYMPTOMS: GASTROINTESTINAL NEGATIVE: 1

## 2025-06-03 NOTE — PROGRESS NOTES
Subjective   Patient ID: Clair Romero is a 54 y.o. female.  Chief Complaint   Patient presents with    Surgical Consult     NP refer C-Invasive ductal carcinoma left breast       HPI    Review of Systems       Objective   Physical Exam   /64 (BP Site: Right Upper Arm, Patient Position: Sitting, BP Cuff Size: Large Adult)   Pulse 54   Temp 97.4 °F (36.3 °C) (Temporal)   Ht 1.499 m (4' 11.02\")   Wt 78.1 kg (172 lb 3.2 oz)   LMP  (LMP Unknown)   SpO2 95%   BMI 34.76 kg/m²       Assessment         Plan           Anastacia Brandon, CMA  
  ULTRASOUND:     TECHNIQUE: Targeted ultrasound of the left breast was performed. Grayscale  images and color images of the real-time examination were reviewed. The axilla  was also imaged.     FINDINGS:        In the upper outer left breast at 2:00, 3 cm from the nipple there is a 1.1 x  0.8 x 1.7 cm hypoechoic spiculated mass. This is palpable. This corresponds to  the palpable area on mammography and the mammographic finding. There are no  other abnormalities in the region of interest.     In the left axilla, there is a lymph node with focal cortical thickening. The  cortical thickness measures up to 2.7 mm. This still has a fatty hilum. A second  normal lymph node is seen. This lymph node has a very thin cortex.        IMPRESSION:     1. Spiculated 1.7 cm mass in the upper outer left breast. This is suspicious for  malignancy. An ultrasound-guided biopsy is recommended.  2. Mildly enlarged left axillary lymph node. An ultrasound-guided biopsy is  recommended.    Clinical Staging:         - IA        - Discussed with patient.  National Comprehensive Cancer Network (NCCN) Guidelines Reviewed with Patient:Yes   All available patient images were reviewed  Outside slide review by pathologist initiated   Genetic testing:    - Candidate: No   - Counseled: No   - Performed: Deferred  Metastatic workup:  - na  Functional assessment:  - Referral to OT for Functional Assessment and Optimization Pathway  Plastic/Reconstructive Surgery Referral: Not applicable  Other Referrals:  - Medical Oncology  - Radiation Oncology           Vascular access requirements: Not necessary at this time  Social Service Needs:    - None  Patient Education:   - Risks, Benefits, Expected Outcome, and Alternatives to lumpectomy, mastectomy, and sentinel lymph node biopsy were explained  Individualized shared decision making: Yes       Electronically signed by Penny Koenig MD on 6/3/2025 at 12:24 PM    The patients records and films were

## 2025-06-04 ENCOUNTER — PREP FOR PROCEDURE (OUTPATIENT)
Dept: SURGERY | Age: 55
End: 2025-06-04

## 2025-06-04 ENCOUNTER — TELEPHONE (OUTPATIENT)
Dept: SURGERY | Age: 55
End: 2025-06-04

## 2025-06-04 RX ORDER — ACETAMINOPHEN 500 MG
1000 TABLET ORAL ONCE
Status: CANCELLED | OUTPATIENT
Start: 2025-06-04 | End: 2025-06-04

## 2025-06-04 RX ORDER — SODIUM CHLORIDE 9 MG/ML
INJECTION, SOLUTION INTRAVENOUS CONTINUOUS
Status: CANCELLED | OUTPATIENT
Start: 2025-06-04

## 2025-06-04 RX ORDER — SODIUM CHLORIDE 9 MG/ML
INJECTION, SOLUTION INTRAVENOUS PRN
Status: CANCELLED | OUTPATIENT
Start: 2025-06-04

## 2025-06-04 RX ORDER — SODIUM CHLORIDE 0.9 % (FLUSH) 0.9 %
5-40 SYRINGE (ML) INJECTION EVERY 12 HOURS SCHEDULED
Status: CANCELLED | OUTPATIENT
Start: 2025-06-04

## 2025-06-04 RX ORDER — SODIUM CHLORIDE 0.9 % (FLUSH) 0.9 %
5-40 SYRINGE (ML) INJECTION PRN
Status: CANCELLED | OUTPATIENT
Start: 2025-06-04

## 2025-06-06 ENCOUNTER — CLINICAL DOCUMENTATION (OUTPATIENT)
Dept: CASE MANAGEMENT | Age: 55
End: 2025-06-06

## 2025-06-06 NOTE — PROGRESS NOTES
Name: Clair Romero  : 1970  MRN: [id number]    Oncology Navigation Follow-Up Note    Contact Type:   Integrated Breast Clinic    Providers present during Breast Conference: Jose Chapman, [place], Stanislaw, Opal     Providers present during Breast Clinic: José Koenig, Stanislaw, Opal     Treatment Recommendations: Lumpectomy with SLN, Oncotype DX, radiation therapy, AI x 5 years.      General Interventions: Patient arrived with carolann Alex to discuss treatment recommendations with breast team. Already saw Dr. Koenig in office to discuss surgical planning. Already been referred to oncology pre-hab, appointment on .     Education/Screenings: Breast Clinic Recommendation form completed, discussed, and copy given to patient. Individual shared decision making utilized.Patient verbalizes understanding and denies questions at this time. Scanned form into media.     Referrals: Spiritual care per protocol     Notes: Denies any needs at this time. Will follow through continuum of care.    Electronically signed by Janis Herrmann RN on 2025 at 9:30 AM

## 2025-06-09 ENCOUNTER — SOCIAL WORK (OUTPATIENT)
Dept: INFUSION THERAPY | Age: 55
End: 2025-06-09

## 2025-06-09 NOTE — PROGRESS NOTES
Oncology Social Work    Date: 6/9/2025  Time: 12:52 PM  Name: Clair Romero  MRN: [id number]     Contact Type: Distress Tool Follow-up    Note:   Situation: This staff called Clair Romero (goes by Rosmery)via phone support to introduce myself as the Oncology Social Worker.     Background: Rosmery was referred to this staff by the General Surgery Dept after a recent appointment here. This staff was calling to review the Distress Thermometer provided during their visit.    Coping Score 1    Areas of Concern Identified    Physical Concern: None selected    Expressive Concern: None selected    Social Concern: None selected    Practical Concern: None selected    Existential Concern: None selected    Assessment: This staff had the opportunity to speak with Rosmery. She seemed pleasant as we discussed the call's purpose was to educate about the services provided by the SW. She had no outstanding concerns but did point out she will be having surrender and then begin radiation. For the most part she is coping at this time.  - The opportunity to review her filed Advance Directive was provided. She shared she wants her children (Son Carlos as Primary, and daughter - Tika) as her agents. She asked that we recreate the document to reflect her choices. She would also like to review adding Special Instructions to her document before filing. All will be arranged once she begins radiation treatment.   - No community referrals were placed now because she is too early to know or understand what services she may need. Therefore, her referral services may be reconsidered should her needs regarding assistance change.    Recommendation: Follow-up will be initiated based on need.  provided my contact information and will remain available for support.        ADELFO Red, YAHAIRA, ONC-C, MELISSA  Oncology Social Worker      Electronically signed by ADELFO Red LSW, ACHP-SW on 6/9/2025 at 12:52 PM

## 2025-06-11 ENCOUNTER — HOSPITAL ENCOUNTER (OUTPATIENT)
Dept: PHYSICAL THERAPY | Age: 55
Setting detail: THERAPIES SERIES
Discharge: HOME | End: 2025-06-11
Payer: MEDICAID

## 2025-06-11 PROCEDURE — 97110 THERAPEUTIC EXERCISES: CPT

## 2025-06-11 PROCEDURE — 97161 PT EVAL LOW COMPLEX 20 MIN: CPT

## 2025-06-11 NOTE — PROGRESS NOTES
scapulo-humeral rhythm  Prognosis: good    GOALS:  Patient Goal: \"Return to my normal after surgery.\"    Short Term Goals to be met in 12 weeks:  See LTGs    Long Term Goals to be met in 12 weeks:   Pt to demo left shoulder PROM flexion returned to 127 deg after surgery for ADLs.  Pt to demo left shoulder PROM abduction returned to 130 deg after surgery for ADLs.  Pt to demo left shoulder strength returned to 3-/5 after surgery for IADLs.  Pt to demo QuickDASH score returned to 45.45% after surgery for return to previous level of functioning for survivorship.  Pt to demo left UE lymphedema measures controlled at <155 cm with independence with individualized lymphedema risk reduction strategies based on personal risk factors and when to seek medical attention for assessment of early signs of lymphedema or cellulitis in the affected region.    Patient Education: Plan of care, goals. See \"Treatment Initiated\" for further details.  Education Outcome: Verbalized understanding  Education Barriers: None    PLAN:  Treatment Recommendations: Strengthening, Range of Motion, Conditioning, Lymphedema Management, Manual Techniques, Pain Management, Neuropathy Management, Postural Re-Training, Body Mechanics/Ergonomics, Home Exercise Prescription, and Safety Education    Plan of care initiated.  Plan to see patient up to 2 times per week for up to 12 weeks to address the treatment planned outlined above, with next appointment to be 3 weeks following surgery per protocol.    Time In 1300   Time Out 1345   Timed Code Minutes: 10 min   Total Treatment Time: 45 min       Electronically Signed by: Janis Wei, PT

## 2025-06-13 ENCOUNTER — HOSPITAL ENCOUNTER (OUTPATIENT)
Dept: NUCLEAR MEDICINE | Age: 55
Discharge: HOME | End: 2025-06-13
Attending: SURGERY
Payer: MEDICAID

## 2025-06-13 ENCOUNTER — HOSPITAL ENCOUNTER (OUTPATIENT)
Dept: WOMENS IMAGING | Age: 55
Discharge: HOME | End: 2025-06-13
Attending: RADIOLOGY
Payer: MEDICAID

## 2025-06-13 ENCOUNTER — HOSPITAL ENCOUNTER (OUTPATIENT)
Dept: WOMENS IMAGING | Age: 55
Discharge: HOME | End: 2025-06-13
Payer: MEDICAID

## 2025-06-13 ENCOUNTER — ANESTHESIA (OUTPATIENT)
Dept: OPERATING ROOM | Age: 55
End: 2025-06-13
Payer: MEDICAID

## 2025-06-13 ENCOUNTER — ANESTHESIA EVENT (OUTPATIENT)
Dept: OPERATING ROOM | Age: 55
End: 2025-06-13
Payer: MEDICAID

## 2025-06-13 ENCOUNTER — HOSPITAL ENCOUNTER (OUTPATIENT)
Age: 55
Setting detail: OUTPATIENT SURGERY
Discharge: HOME | End: 2025-06-13
Attending: SURGERY | Admitting: SURGERY
Payer: MEDICAID

## 2025-06-13 VITALS
WEIGHT: 172 LBS | BODY MASS INDEX: 34.68 KG/M2 | SYSTOLIC BLOOD PRESSURE: 121 MMHG | DIASTOLIC BLOOD PRESSURE: 72 MMHG | TEMPERATURE: 98.1 F | OXYGEN SATURATION: 93 % | HEIGHT: 59 IN | RESPIRATION RATE: 16 BRPM | HEART RATE: 74 BPM

## 2025-06-13 DIAGNOSIS — C50.412 MALIGNANT NEOPLASM OF UPPER-OUTER QUADRANT OF LEFT BREAST IN FEMALE, ESTROGEN RECEPTOR POSITIVE (HCC): ICD-10-CM

## 2025-06-13 DIAGNOSIS — C50.912 INVASIVE DUCTAL CARCINOMA OF LEFT BREAST (HCC): ICD-10-CM

## 2025-06-13 DIAGNOSIS — Z17.0 MALIGNANT NEOPLASM OF UPPER-OUTER QUADRANT OF LEFT BREAST IN FEMALE, ESTROGEN RECEPTOR POSITIVE (HCC): ICD-10-CM

## 2025-06-13 DIAGNOSIS — C50.412 MALIGNANT NEOPLASM OF UPPER-OUTER QUADRANT OF LEFT BREAST IN FEMALE, ESTROGEN RECEPTOR POSITIVE (HCC): Primary | ICD-10-CM

## 2025-06-13 DIAGNOSIS — Z17.0 MALIGNANT NEOPLASM OF UPPER-OUTER QUADRANT OF LEFT BREAST IN FEMALE, ESTROGEN RECEPTOR POSITIVE (HCC): Primary | ICD-10-CM

## 2025-06-13 DIAGNOSIS — C50.412 MALIGNANT NEOPLASM OF UPPER-OUTER QUADRANT OF LEFT FEMALE BREAST (HCC): ICD-10-CM

## 2025-06-13 PROCEDURE — 6370000000 HC RX 637 (ALT 250 FOR IP): Performed by: STUDENT IN AN ORGANIZED HEALTH CARE EDUCATION/TRAINING PROGRAM

## 2025-06-13 PROCEDURE — 38900 IO MAP OF SENT LYMPH NODE: CPT | Performed by: SURGERY

## 2025-06-13 PROCEDURE — A9541 TC99M SULFUR COLLOID: HCPCS | Performed by: SURGERY

## 2025-06-13 PROCEDURE — 38525 BIOPSY/REMOVAL LYMPH NODES: CPT | Performed by: SURGERY

## 2025-06-13 PROCEDURE — 2500000003 HC RX 250 WO HCPCS: Performed by: NURSE PRACTITIONER

## 2025-06-13 PROCEDURE — 7100000010 HC PHASE II RECOVERY - FIRST 15 MIN: Performed by: SURGERY

## 2025-06-13 PROCEDURE — 6360000002 HC RX W HCPCS: Performed by: NURSE PRACTITIONER

## 2025-06-13 PROCEDURE — 94640 AIRWAY INHALATION TREATMENT: CPT

## 2025-06-13 PROCEDURE — 3430000000 HC RX DIAGNOSTIC RADIOPHARMACEUTICAL: Performed by: SURGERY

## 2025-06-13 PROCEDURE — 2580000003 HC RX 258: Performed by: NURSE PRACTITIONER

## 2025-06-13 PROCEDURE — 3600000002 HC SURGERY LEVEL 2 BASE: Performed by: SURGERY

## 2025-06-13 PROCEDURE — 2709999900 US PLACE BREAST LOC DEVICE 1ST LESION LEFT

## 2025-06-13 PROCEDURE — G0279 TOMOSYNTHESIS, MAMMO: HCPCS

## 2025-06-13 PROCEDURE — 7100000011 HC PHASE II RECOVERY - ADDTL 15 MIN: Performed by: SURGERY

## 2025-06-13 PROCEDURE — 19301 PARTIAL MASTECTOMY: CPT | Performed by: SURGERY

## 2025-06-13 PROCEDURE — 7100000000 HC PACU RECOVERY - FIRST 15 MIN: Performed by: SURGERY

## 2025-06-13 PROCEDURE — 6370000000 HC RX 637 (ALT 250 FOR IP): Performed by: NURSE PRACTITIONER

## 2025-06-13 PROCEDURE — 6360000002 HC RX W HCPCS

## 2025-06-13 PROCEDURE — 6360000002 HC RX W HCPCS: Performed by: SURGERY

## 2025-06-13 PROCEDURE — 6370000000 HC RX 637 (ALT 250 FOR IP)

## 2025-06-13 PROCEDURE — 3700000000 HC ANESTHESIA ATTENDED CARE: Performed by: SURGERY

## 2025-06-13 PROCEDURE — 3600000012 HC SURGERY LEVEL 2 ADDTL 15MIN: Performed by: SURGERY

## 2025-06-13 PROCEDURE — 38792 RA TRACER ID OF SENTINL NODE: CPT

## 2025-06-13 PROCEDURE — 76098 X-RAY EXAM SURGICAL SPECIMEN: CPT

## 2025-06-13 PROCEDURE — 88307 TISSUE EXAM BY PATHOLOGIST: CPT

## 2025-06-13 PROCEDURE — 38792 RA TRACER ID OF SENTINL NODE: CPT | Performed by: SURGERY

## 2025-06-13 PROCEDURE — 3700000001 HC ADD 15 MINUTES (ANESTHESIA): Performed by: SURGERY

## 2025-06-13 PROCEDURE — 2709999900 HC NON-CHARGEABLE SUPPLY: Performed by: SURGERY

## 2025-06-13 PROCEDURE — 2500000003 HC RX 250 WO HCPCS

## 2025-06-13 PROCEDURE — 7100000001 HC PACU RECOVERY - ADDTL 15 MIN: Performed by: SURGERY

## 2025-06-13 RX ORDER — ISOSULFAN BLUE 50 MG/5ML
INJECTION, SOLUTION SUBCUTANEOUS PRN
Status: DISCONTINUED | OUTPATIENT
Start: 2025-06-13 | End: 2025-06-13 | Stop reason: ALTCHOICE

## 2025-06-13 RX ORDER — IPRATROPIUM BROMIDE AND ALBUTEROL SULFATE 2.5; .5 MG/3ML; MG/3ML
SOLUTION RESPIRATORY (INHALATION)
Status: COMPLETED
Start: 2025-06-13 | End: 2025-06-13

## 2025-06-13 RX ORDER — NALOXONE HYDROCHLORIDE 0.4 MG/ML
INJECTION, SOLUTION INTRAMUSCULAR; INTRAVENOUS; SUBCUTANEOUS PRN
Status: DISCONTINUED | OUTPATIENT
Start: 2025-06-13 | End: 2025-06-13 | Stop reason: HOSPADM

## 2025-06-13 RX ORDER — SODIUM CHLORIDE 9 MG/ML
INJECTION, SOLUTION INTRAVENOUS CONTINUOUS
Status: DISCONTINUED | OUTPATIENT
Start: 2025-06-13 | End: 2025-06-13 | Stop reason: HOSPADM

## 2025-06-13 RX ORDER — FENTANYL CITRATE 50 UG/ML
INJECTION, SOLUTION INTRAMUSCULAR; INTRAVENOUS
Status: DISCONTINUED | OUTPATIENT
Start: 2025-06-13 | End: 2025-06-13 | Stop reason: SDUPTHER

## 2025-06-13 RX ORDER — ACETAMINOPHEN 500 MG
1000 TABLET ORAL ONCE
Status: COMPLETED | OUTPATIENT
Start: 2025-06-13 | End: 2025-06-13

## 2025-06-13 RX ORDER — SODIUM CHLORIDE 9 MG/ML
INJECTION, SOLUTION INTRAVENOUS PRN
Status: DISCONTINUED | OUTPATIENT
Start: 2025-06-13 | End: 2025-06-13 | Stop reason: HOSPADM

## 2025-06-13 RX ORDER — MEPERIDINE HYDROCHLORIDE 25 MG/ML
12.5 INJECTION INTRAMUSCULAR; INTRAVENOUS; SUBCUTANEOUS EVERY 5 MIN PRN
Status: DISCONTINUED | OUTPATIENT
Start: 2025-06-13 | End: 2025-06-13 | Stop reason: HOSPADM

## 2025-06-13 RX ORDER — FENTANYL CITRATE 50 UG/ML
50 INJECTION, SOLUTION INTRAMUSCULAR; INTRAVENOUS EVERY 5 MIN PRN
Status: DISCONTINUED | OUTPATIENT
Start: 2025-06-13 | End: 2025-06-13 | Stop reason: HOSPADM

## 2025-06-13 RX ORDER — MIDAZOLAM HYDROCHLORIDE 1 MG/ML
INJECTION, SOLUTION INTRAMUSCULAR; INTRAVENOUS
Status: DISCONTINUED | OUTPATIENT
Start: 2025-06-13 | End: 2025-06-13 | Stop reason: SDUPTHER

## 2025-06-13 RX ORDER — PROPOFOL 10 MG/ML
INJECTION, EMULSION INTRAVENOUS
Status: DISCONTINUED | OUTPATIENT
Start: 2025-06-13 | End: 2025-06-13 | Stop reason: SDUPTHER

## 2025-06-13 RX ORDER — HYDROCODONE BITARTRATE AND ACETAMINOPHEN 5; 325 MG/1; MG/1
1 TABLET ORAL EVERY 4 HOURS PRN
Qty: 18 TABLET | Refills: 0 | Status: SHIPPED | OUTPATIENT
Start: 2025-06-13 | End: 2025-06-16

## 2025-06-13 RX ORDER — SUCCINYLCHOLINE/SOD CL,ISO/PF 200MG/10ML
SYRINGE (ML) INTRAVENOUS
Status: DISCONTINUED | OUTPATIENT
Start: 2025-06-13 | End: 2025-06-13 | Stop reason: SDUPTHER

## 2025-06-13 RX ORDER — SODIUM CHLORIDE 0.9 % (FLUSH) 0.9 %
5-40 SYRINGE (ML) INJECTION PRN
Status: DISCONTINUED | OUTPATIENT
Start: 2025-06-13 | End: 2025-06-13 | Stop reason: HOSPADM

## 2025-06-13 RX ORDER — SODIUM CHLORIDE 0.9 % (FLUSH) 0.9 %
5-40 SYRINGE (ML) INJECTION EVERY 12 HOURS SCHEDULED
Status: DISCONTINUED | OUTPATIENT
Start: 2025-06-13 | End: 2025-06-13 | Stop reason: HOSPADM

## 2025-06-13 RX ORDER — DEXAMETHASONE SODIUM PHOSPHATE 4 MG/ML
INJECTION, SOLUTION INTRA-ARTICULAR; INTRALESIONAL; INTRAMUSCULAR; INTRAVENOUS; SOFT TISSUE
Status: DISCONTINUED | OUTPATIENT
Start: 2025-06-13 | End: 2025-06-13 | Stop reason: SDUPTHER

## 2025-06-13 RX ORDER — ONDANSETRON 2 MG/ML
4 INJECTION INTRAMUSCULAR; INTRAVENOUS
Status: DISCONTINUED | OUTPATIENT
Start: 2025-06-13 | End: 2025-06-13 | Stop reason: HOSPADM

## 2025-06-13 RX ORDER — IPRATROPIUM BROMIDE AND ALBUTEROL SULFATE 2.5; .5 MG/3ML; MG/3ML
1 SOLUTION RESPIRATORY (INHALATION) ONCE
Status: COMPLETED | OUTPATIENT
Start: 2025-06-13 | End: 2025-06-13

## 2025-06-13 RX ORDER — TECHNETIUM TC 99M SULFUR COLLOID 2 MG
1.2 KIT MISCELLANEOUS
Status: COMPLETED | OUTPATIENT
Start: 2025-06-13 | End: 2025-06-13

## 2025-06-13 RX ORDER — ONDANSETRON 2 MG/ML
INJECTION INTRAMUSCULAR; INTRAVENOUS
Status: DISCONTINUED | OUTPATIENT
Start: 2025-06-13 | End: 2025-06-13 | Stop reason: SDUPTHER

## 2025-06-13 RX ORDER — LIDOCAINE HCL/PF 100 MG/5ML
SYRINGE (ML) INJECTION
Status: DISCONTINUED | OUTPATIENT
Start: 2025-06-13 | End: 2025-06-13 | Stop reason: SDUPTHER

## 2025-06-13 RX ORDER — DIPHENHYDRAMINE HYDROCHLORIDE 50 MG/ML
12.5 INJECTION, SOLUTION INTRAMUSCULAR; INTRAVENOUS
Status: DISCONTINUED | OUTPATIENT
Start: 2025-06-13 | End: 2025-06-13 | Stop reason: HOSPADM

## 2025-06-13 RX ADMIN — FENTANYL CITRATE 50 MCG: 50 INJECTION, SOLUTION INTRAMUSCULAR; INTRAVENOUS at 15:05

## 2025-06-13 RX ADMIN — PROPOFOL 60 MG: 10 INJECTION, EMULSION INTRAVENOUS at 14:10

## 2025-06-13 RX ADMIN — IPRATROPIUM BROMIDE AND ALBUTEROL SULFATE 1 DOSE: .5; 3 SOLUTION RESPIRATORY (INHALATION) at 12:08

## 2025-06-13 RX ADMIN — Medication 1.2 MILLICURIE: at 10:09

## 2025-06-13 RX ADMIN — FENTANYL CITRATE 25 MCG: 50 INJECTION, SOLUTION INTRAMUSCULAR; INTRAVENOUS at 14:06

## 2025-06-13 RX ADMIN — MIDAZOLAM 2 MG: 1 INJECTION INTRAMUSCULAR; INTRAVENOUS at 14:03

## 2025-06-13 RX ADMIN — ONDANSETRON 4 MG: 2 INJECTION, SOLUTION INTRAMUSCULAR; INTRAVENOUS at 15:36

## 2025-06-13 RX ADMIN — IPRATROPIUM BROMIDE AND ALBUTEROL SULFATE 1 DOSE: .5; 3 SOLUTION RESPIRATORY (INHALATION) at 15:57

## 2025-06-13 RX ADMIN — PROPOFOL 100 MG: 10 INJECTION, EMULSION INTRAVENOUS at 14:06

## 2025-06-13 RX ADMIN — Medication 100 MG: at 14:06

## 2025-06-13 RX ADMIN — WATER 2000 MG: 1 INJECTION INTRAMUSCULAR; INTRAVENOUS; SUBCUTANEOUS at 14:13

## 2025-06-13 RX ADMIN — IPRATROPIUM BROMIDE AND ALBUTEROL SULFATE 1 DOSE: 2.5; .5 SOLUTION RESPIRATORY (INHALATION) at 15:57

## 2025-06-13 RX ADMIN — PROPOFOL 80 MCG/KG/MIN: 10 INJECTION, EMULSION INTRAVENOUS at 14:11

## 2025-06-13 RX ADMIN — DEXAMETHASONE SODIUM PHOSPHATE 4 MG: 4 INJECTION, SOLUTION INTRAMUSCULAR; INTRAVENOUS at 14:14

## 2025-06-13 RX ADMIN — FENTANYL CITRATE 25 MCG: 50 INJECTION, SOLUTION INTRAMUSCULAR; INTRAVENOUS at 15:17

## 2025-06-13 RX ADMIN — ACETAMINOPHEN 1000 MG: 500 TABLET ORAL at 12:01

## 2025-06-13 RX ADMIN — Medication 120 MG: at 14:06

## 2025-06-13 RX ADMIN — SODIUM CHLORIDE: 0.9 INJECTION, SOLUTION INTRAVENOUS at 12:36

## 2025-06-13 ASSESSMENT — PAIN - FUNCTIONAL ASSESSMENT
PAIN_FUNCTIONAL_ASSESSMENT: 0-10
PAIN_FUNCTIONAL_ASSESSMENT: ACTIVITIES ARE NOT PREVENTED

## 2025-06-13 ASSESSMENT — PAIN SCALES - WONG BAKER
WONGBAKER_NUMERICALRESPONSE: NO HURT

## 2025-06-13 ASSESSMENT — PAIN DESCRIPTION - DESCRIPTORS
DESCRIPTORS: BURNING
DESCRIPTORS: BURNING

## 2025-06-13 ASSESSMENT — LIFESTYLE VARIABLES: SMOKING_STATUS: 1

## 2025-06-13 ASSESSMENT — PAIN DESCRIPTION - ORIENTATION: ORIENTATION: LEFT

## 2025-06-13 ASSESSMENT — PAIN SCALES - GENERAL
PAINLEVEL_OUTOF10: 5
PAINLEVEL_OUTOF10: 5
PAINLEVEL_OUTOF10: 4
PAINLEVEL_OUTOF10: 4
PAINLEVEL_OUTOF10: 5

## 2025-06-13 ASSESSMENT — ENCOUNTER SYMPTOMS: GASTROINTESTINAL NEGATIVE: 1

## 2025-06-13 ASSESSMENT — PAIN DESCRIPTION - LOCATION: LOCATION: BREAST

## 2025-06-13 NOTE — PROGRESS NOTES

## 2025-06-13 NOTE — PROGRESS NOTES
Patient oriented to Same Day department and admitted to Same Day Surgery room 10.   Patient verbalized approval for first name, last initial with physician name on unit whiteboard.     Plan of care reviewed with patient.   Patient room whiteboard filled out and discussed with patient and responsible adult.   Patient and responsible adult offered Same Day Welcome Packet to review.    Call light in reach.   Bed in lowest position, locked, with one bed rail up.   SCDs and warming blanket in place.  Appropriate arm bands on patient.   Bathroom offered.   All questions and concerns of patient addressed.        Meds to Beds:   Patient informed of [facility] Meds to Beds program during admission. Patient is agreeable to program.   Contact information for the pharmacy and the Meds to Beds program:   Name: Isai   Relationship to patient:spouse/significant other   Phone number: [phone]

## 2025-06-13 NOTE — PROCEDURES
PROCEDURE NOTE      PATIENT NAME: Clair Romero  MEDICAL RECORD NO. [id number]  SURGEON: Penny Koenig MD MD FACS  Primary Care Physician: Dania Solorio DO  Date: 6/13/2025, 12:17 PM     PROCEDURE PERFORMED: Left  [place] Lymph node Injection  PREOPERATIVE DIAGNOSIS: Left Breast Cancer  POSTOPERATIVE DIAGNOSIS: Same, path pending  SURGEON:  Penny Koenig MD MD FACS  ANESTHESIA:  None  ESTIMATED BLOOD LOSS:  None  COMPLICATIONS:  None; patient tolerated the procedure well.  CONDITION: stable          Procedure Details     The patient was seen in Nuclear Medicine in radiology. The risks, benefits, complications, treatment options, and expected outcomes were previously discussed with the patient. The possibilities of reaction to medication were discussed with the patient. The patient concurred with the proposed plan, giving informed consent.  The site of surgery properly noted/marked.   The patient was taken to the nuclear medicine room and identified  and the procedure verified as [place] Lymphnode injection . A Time Out was held and the above information confirmed.  The left breast was identified and the site for injection periareaolarly was identified. The breast was prepped with 1% alcohol. 0.5 cc 's of unfiltered technetium sulfur colloid was injected into the dermis of the skin at the site. A sterile dressing was placed over the injection site. The patient tolerated the procedure well.             Penny Koenig MD  Electronically signed 6/13/2025 at 12:17 PM

## 2025-06-13 NOTE — PROGRESS NOTES
Pt returned to [facility] room 10. Vitals and assessment as charted. 0.9 infusing, to count from PACU. Pt has crackers and water. Family at the bedside. Pt and family verbalized understanding of discharge criteria and call light use. Call light in reach.

## 2025-06-13 NOTE — DISCHARGE INSTRUCTIONS
replace with AMD dressings daily.  Closed suction drain care. Every 8 hours open the stopper on the drain reservoir and allow it to completely inflate. Measure the amount of drainage in the reservoir and write it down, along with the date and time you did the measurement. Empty the reservoir into the sink or toilet. Squeeze the drain reservoir to remove the air inside, then replace the stopper so that when you let go of the reservoir, it wants to expand. It is then on suction. Do not allow the drain to expand completely.  If it is completely expanded, it is NOT suctioning.  BREAST PROCEDURES   Following breast procedures it is important to use supportive garments  It is also normal with sentinel lymph node biopsy for the urine to have a bluish color.  ABDOMINAL & LAPAROSCOPIC PROCEDURES   1. You are encouraged to get up and move around as this helps with the circulation and speeds up the healing process.  2. Breath deeply and cough from time to time. This helps to clear your lungs and helps prevent pneumonia.  3. Supporting your incision with a pillow or your hand helps to minimize discomfort and pain.  4. Laparoscopic patients may develop shoulder pain in the first 48 hours from the gas used during the procedure.  FOLLOW UP CARE, SPECIFICALLY WATCH FOR:    Fever over 101 degrees by mouth   Increased redness, warmth, hardness at operative site.   Blood soaked dressing (small amounts of oozing may be normal.)   Increased or progressive drainage from the surgical area   Inability to urinate or blood in the urine   Pain not relieved by the medications ordered   Persistent nausea and/or vomiting, unable to retain fluids.    FOLLOW-UP APPOINTMENT in 2 weeks    Call my office if you have any problem that concerns you, [phone]. After hours, you can reach the answering service via the office phone number. IF YOU NEED IMMEDIATE ATTENTION, GO TO THE EMERGENCY ROOM AND YOUR DOCTOR WILL BE CONTACTED.    Penny Koenig,

## 2025-06-13 NOTE — ANESTHESIA PRE PROCEDURE
Department of Anesthesiology  Preprocedure Note       Name:  Clair Romero   Age:  54 y.o.  :  1970                                          MRN:  [id number]         Date:  2025      Surgeon: Surgeon(s):  Penny Koenig MD    Procedure: Procedure(s):  Left breast lumpectomy with preop needle localization with sentinel lymph node biopsy    Medications prior to admission:   Prior to Admission medications    Medication Sig Start Date End Date Taking? Authorizing Provider   ketoconazole (NIZORAL) 2 % cream Apply topically daily. 25   Dania Solorio DO   senna (SENOKOT) 8.6 MG TABS tablet Take 1 tablet by mouth daily as needed 25   Ina Anderson MD   buprenorphine-naloxone (SUBOXONE) 8-2 MG FILM SL film PLACE 1 AND 1/2 FILMS UNDER THE TONGUE ONCE DAILY 25   ProviderIna MD   metoprolol tartrate (LOPRESSOR) 25 MG tablet TAKE 1 TABLET BY MOUTH TWICE DAILY 25   Mortimer, Connor, PA-C   atorvastatin (LIPITOR) 40 MG tablet Take 1 tablet by mouth nightly 25   Tc Marcum MD   aspirin 325 MG EC tablet Take 1 tablet by mouth daily 24   Tc Marcum MD   triamterene-hydroCHLOROthiazide (MAXZIDE-25) 37.5-25 MG per tablet Take 0.5 tablets by mouth every other day 23   Tc Marcum MD   pantoprazole (PROTONIX) 40 MG tablet Take 1 tablet by mouth every morning (before breakfast) 5/15/23   Tad Gaines MD   VENTOLIN  (90 Base) MCG/ACT inhaler inhale 2 puffs INTO THE LUNGS every 4 hours if needed for wheezing or shortness of breath 23   Nisha Hopson APRN - CNP   citalopram (CELEXA) 10 MG tablet Take 1 tablet by mouth daily    Provider, MD Ina       Current medications:    Current Facility-Administered Medications   Medication Dose Route Frequency Provider Last Rate Last Admin   • acetaminophen (TYLENOL) tablet 1,000 mg  1,000 mg Oral Once Isela Kaur APRN - CNP       • 0.9 % sodium chloride infusion   IntraVENous Continuous

## 2025-06-13 NOTE — PROGRESS NOTES
Contact Type:  [facility]    Contact Information: Arrived with boyfriend and cousin for needle localization. Having breast surgery today with Dr. Koenig.    Diagnosis: Invasive Ductal Carcinoma    Patient Expresses Need(s) For: n/a     Teaching Sheets/Booklets Provided: n/a    Notes: Procedure explained and questions addressed as needed.  Dr. Rizo placed wire. Secured with gauze and tape per protocol. Gift given. Transported patient with belongings to Nuclear Medicine Department  via wheelchair at 0945.

## 2025-06-13 NOTE — PROGRESS NOTES
1546 Patient arrives to PACU, not yet alert to voice. Resp easy and unlabored on room air. 4L NC applied upon arrival.    1555 Pt continues to be encouraged to take deep breaths, spo2 remains 90-92 on 4L, orders for Duoneb.     1557 Duoneb started at this time.     1608 Duoneb completed. Pt encouraged to cough and take deep breaths, pt followed instructions well.     1610 Pt states pain is a 6/10 at this time, then drifts off to sleep easily. Not medicated at this time    1620 Patient states pain is a 5/10 and tolerable at this time. Resp easy and unlabored on 3L NC. VSS. Pt meets criteria for discharge from PACU    1623 Pt transported to [facility] [place] in stable condition     1630 Report given to Trista REAL

## 2025-06-13 NOTE — ANESTHESIA POSTPROCEDURE EVALUATION
Department of Anesthesiology  Postprocedure Note    Patient: Clair Romero  MRN: [id number]  YOB: 1970  Date of evaluation: 6/13/2025    Procedure Summary       Date: 06/13/25 Room / Location: [facility] OR 05 / [facility] OR    Anesthesia Start: 1400 Anesthesia Stop: 1548    Procedure: Left breast lumpectomy with preop needle localization with sentinel lymph node biopsy (Left: Breast) Diagnosis:       Malignant neoplasm of upper-outer quadrant of left female breast (HCC)      (Malignant neoplasm of upper-outer quadrant of left female breast (HCC) [C50.412])    Surgeons: Penny Koenig MD Responsible Provider: Jay Wagoner MD    Anesthesia Type: general ASA Status: 3            Anesthesia Type: No value filed.    Marlin Phase I: Marlin Score: 8    Marlin Phase II:      Anesthesia Post Evaluation    Patient location during evaluation: PACU  Patient participation: complete - patient participated  Level of consciousness: awake and alert  Airway patency: patent  Nausea & Vomiting: no nausea  Cardiovascular status: blood pressure returned to baseline and hemodynamically stable  Respiratory status: acceptable and spontaneous ventilation  Hydration status: euvolemic  Pain management: adequate    No notable events documented.

## 2025-06-13 NOTE — H&P
Penny Koenig MD MD    General Surgery  New Patient Evaluation in Office  Pt Name: Clair Romero  Date of Birth 1970   Today's Date: 6/13/2025  Medical Record Number: [id number]  Referring Provider: No ref. provider found  Primary Care Provider: Dania Solorio DO  No chief complaint on file.    ASSESSMENT      Problem List Items Addressed This Visit    None      Past Medical History:   Diagnosis Date    Abnormal Pap smear of cervix     Arthritis     CAD (coronary artery disease)     Chronic back pain     Chronic kidney disease     ddd     through out whole back    Endometriosis     Hip pain     Hyperlipidemia     MRSA (methicillin resistant staph aureus) culture positive 05/21/2014    urine    Myocardial infarct (HCC)     2020 ? pt was told she had a heart attack    Obesity 04/16/2015    Pneumonia            Cancer Staging   Malignant neoplasm of upper-outer quadrant of left breast in female, estrogen receptor positive (HCC)  Staging form: Breast, AJCC 8th Edition  - Clinical stage from 6/3/2025: Stage IA (cT1c, cN0, cM0, G2, ER+, NM+, HER2-) - Signed by Penny Koenig MD on 6/3/2025      PLANS   Assessment & Plan   Schedule Clair for   Lumpectomy with SLN biopsy for LUQ 1.7cm mass at 2 o'clock 3cm from nipple   Lymphoscintigraphy injection  Oncotype DX to be ordered on the final specimen  Medical oncology consultation  For rehabilitation consultation  Radiation oncology  In the office, I had a discussion with the patient regarding the treatment options for invasive breast cancer. We discussed lumpectomy and radiation versus mastectomy. We discussed the fact that there is no statistical difference in long term survival or recurrence between the two options.  Candidate for Lumpectomy YES/NO: Yes  Candidate for omission of sln bx YES/NO: Yes  Candidate for potential omission of radiation YES/NO: No  We had a long discussion in the office regarding treatment options for breast cancer. We discussed lumpectomy  Occupational Stress Questionnaire     Feeling of Stress : To some extent   Social Connections: Moderately Isolated (9/15/2020)    [facility], [facility]    Social Connection and Isolation Panel [NHANES]     Frequency of Communication with Friends and Family: Three times a week     Frequency of Social Gatherings with Friends and Family: Never     Attends Hindu Services: More than 4 times per year     Active Member of Clubs or Organizations: No     Attends Club or Organization Meetings: Never     Marital Status:    Intimate Partner Violence: Not At Risk (5/12/2023)    Humiliation, Afraid, Rape, and Kick questionnaire     Fear of Current or Ex-Partner: No     Emotionally Abused: No     Physically Abused: No     Sexually Abused: No   Housing Stability: Low Risk  (5/19/2025)    Housing Stability Vital Sign     Unable to Pay for Housing in the Last Year: No     Number of Times Moved in the Last Year: 0     Homeless in the Last Year: No         Health Screening Exams  Health Maintenance   Topic Date Due    Hepatitis B vaccine (1 of 3 - 19+ 3-dose series) Never done    Shingles vaccine (1 of 2) Never done    Pneumococcal 50+ years Vaccine (2 of 2 - PCV) 09/09/2020    COVID-19 Vaccine (1 - 2024-25 season) Never done    Flu vaccine (Season Ended) 08/01/2025    Depression Screen  05/19/2026    A1C test (Diabetic or Prediabetic)  05/21/2026    Lipids  05/21/2026    Lung Cancer Screening &/or Counseling  05/23/2026    Colorectal Cancer Screen  10/06/2026    Cervical cancer screen  10/26/2026    DTaP/Tdap/Td vaccine (2 - Td or Tdap) 03/25/2027    Breast cancer screen  05/21/2027    Hepatitis C screen  Completed    Hepatitis A vaccine  Aged Out    Hib vaccine  Aged Out    Polio vaccine  Aged Out    Meningococcal (ACWY) vaccine  Aged Out    Meningococcal B vaccine  Aged Out    Pneumococcal 0-49 years Vaccine  Discontinued    HIV screen  Discontinued       Review of Systems  Review of Systems

## 2025-06-16 ENCOUNTER — TELEPHONE (OUTPATIENT)
Dept: SURGERY | Age: 55
End: 2025-06-16

## 2025-06-16 NOTE — TELEPHONE ENCOUNTER
I called pt in regards to s/p left breast lumpectomy on 6/13/25.  She states she is sore, but otherwise doing okay.  She is using ice.  She will call w/any problems.  She has an appt on 6/19/25.

## 2025-06-17 NOTE — OP NOTE
Operative Note      Patient: Clair Romero  YOB: 1970  MRN: [id number]    Date of Procedure: 6/13/2025    Pre-Op Diagnosis Codes:      * Malignant neoplasm of upper-outer quadrant of left female breast (HCC) [C50.412]   Cancer Staging   Malignant neoplasm of upper-outer quadrant of left breast in female, estrogen receptor positive (HCC)  Staging form: Breast, AJCC 8th Edition  - Clinical stage from 6/3/2025: Stage IA (cT1c, cN0, cM0, G2, ER+, NC+, HER2-) - Signed by Penny Koenig MD on 6/3/2025    Post-Op Diagnosis: Same       Procedure(s):  Left breast lumpectomy with preop needle localization with sentinel lymph node biopsy  Injection of isofluorene blue for lymph node identification    Surgeon(s):  Penny Koenig MD    Assistant:   * No surgical staff found *    Anesthesia: General    Estimated Blood Loss (mL): 20 ml    Complications: None    Specimens:   ID Type Source Tests Collected by Time Destination   A : Left breast lump Tissue Breast SURGICAL PATHOLOGY Penny Koenig MD 6/13/2025 1447    B : [place] lymph node, left Tissue Lymph Node SURGICAL PATHOLOGY Penny Koenig MD 6/13/2025 1447    C : Left breast, additional skin margin Tissue Breast SURGICAL PATHOLOGY Penny Koenig MD 6/13/2025 1522        Implants:  * No implants in log *      Drains: * No LDAs found *    Findings:  Infection Present At Time Of Surgery (PATOS) (choose all levels that have infection present):  No infection present  Other Findings:   SLN with 186 count   [place] Node Biopsy for Breast Cancer - Left  Operation performed with curative intent. Yes   Tracer(s) used to identify sentinel nodes in the upfront surgery (non-neoadjuvant) setting (select all that apply). Dye and Radioactive tracer   Tracer(s) used to identify sentinel nodes in the neoadjuvant setting (select all that apply). N/A   All nodes (colored or non-colored) present at the end of a dye-filled lymphatic channel were removed. Yes   All

## 2025-06-19 ENCOUNTER — OFFICE VISIT (OUTPATIENT)
Dept: SURGERY | Age: 55
End: 2025-06-19

## 2025-06-19 VITALS
BODY MASS INDEX: 34.27 KG/M2 | RESPIRATION RATE: 18 BRPM | TEMPERATURE: 97.8 F | HEIGHT: 59 IN | DIASTOLIC BLOOD PRESSURE: 74 MMHG | OXYGEN SATURATION: 97 % | HEART RATE: 69 BPM | WEIGHT: 170 LBS | SYSTOLIC BLOOD PRESSURE: 118 MMHG

## 2025-06-19 DIAGNOSIS — Z17.0 MALIGNANT NEOPLASM OF UPPER-OUTER QUADRANT OF LEFT BREAST IN FEMALE, ESTROGEN RECEPTOR POSITIVE (HCC): Primary | ICD-10-CM

## 2025-06-19 DIAGNOSIS — C50.412 MALIGNANT NEOPLASM OF UPPER-OUTER QUADRANT OF LEFT BREAST IN FEMALE, ESTROGEN RECEPTOR POSITIVE (HCC): Primary | ICD-10-CM

## 2025-06-19 PROCEDURE — 99024 POSTOP FOLLOW-UP VISIT: CPT | Performed by: SURGERY

## 2025-06-19 NOTE — PROGRESS NOTES
in  > 10% of invasive       tumor     88307 x 2   20706         The patient (or guardian, if applicable) and other individuals in attendance with the patient were advised that Artificial Intelligence will be utilized during this visit to record, process the conversation to generate a clinical note, and support improvement of the AI technology. The patient (or guardian, if applicable) and other individuals in attendance at the appointment consented to the use of AI, including the recording.                  Electronically signed by Penny Koenig MD on 6/19/2025 at 9:04 AM

## 2025-06-25 ENCOUNTER — TELEPHONE (OUTPATIENT)
Dept: CASE MANAGEMENT | Age: 55
End: 2025-06-25

## 2025-06-26 ENCOUNTER — TELEPHONE (OUTPATIENT)
Dept: SURGERY | Age: 55
End: 2025-06-26

## 2025-06-26 NOTE — TELEPHONE ENCOUNTER
S/P Left breast lumpectomy with preop needle localization with sentinel lymph node biopsy 6/13/25 with Dr. Koenig.    Patient called to office with concerns regarding drainage from her incision site. Patient states serosanguinous fluid seeping from site with redness and increased discomfort. Patient denies warmth at the site, feels fine generally despite the localized symptoms.    no

## 2025-06-26 NOTE — TELEPHONE ENCOUNTER
Name: Clair Romero  : 1970  MRN: [id number]    Oncology Navigation- Follow-up    Contact Type: Telephone    Notes: Called pt to cancel appointment for 25 with Dr. Vanessa. OncoType Dx results pending.  Pt also stated she is having drainage from breast incision and incision is red. States it feels firm.  Denies any fever or pain. Encouraged to call Dr. Koenig's office first thing tomorrow to be seen.     Electronically signed by Jessika Casas RN on 2025 at 8:39 PM

## 2025-07-07 ENCOUNTER — HOSPITAL ENCOUNTER (OUTPATIENT)
Dept: PHYSICAL THERAPY | Age: 55
Setting detail: THERAPIES SERIES
Discharge: HOME | End: 2025-07-07
Payer: MEDICAID

## 2025-07-07 PROCEDURE — 97110 THERAPEUTIC EXERCISES: CPT

## 2025-07-07 NOTE — PROGRESS NOTES
* PLEASE SIGN, DATE AND TIME CERTIFICATION BELOW AND RETURN TO [facility] OUTPATIENT REHABILITATION (FAX #: [phone]).  ATTEST/CO-SIGN IF ACCESSING VIA INEmpiriboxET.  THANK YOU.**    I certify that I have examined the patient below and determined that Physical Medicine and Rehabilitation service is necessary and that I approve the established plan of care for up to 90 days or as specifically noted.  Attestation, signature or co-signature of physician indicates approval of certification requirements.    ________________________ ____________  Physician Signature   Date    [facility]  ONCOLOGY REHABILITATION  PHYSICAL THERAPY  [x] PROGRESS NOTE    [x] SPECIALIZED THERAPY SERVICES - [place]     Date: 2025  Patient Name:  Clair Romero  : 1970  MRN: [id number]  CSN: [id number]    Referring Practitioner Penny Koenig MD [id number]      Diagnosis  Diagnoses       C50.412, Z17.0 (ICD-10-CM) - Malignant neoplasm of upper-outer quadrant of left breast in female, estrogen receptor positive (HCC)           Treatment Diagnosis I89.0 Lymphedema, not elsewhere classified  M25.512  Left Shoulder Pain  M25.612 Stiffness of Left Shoulder  R53.1 Weakness   Date of Evaluation 25   Additional Pertinent History Clair Romero has a past medical history of Abnormal Pap smear of cervix, Arthritis, CAD (coronary artery disease), Chronic back pain, Chronic kidney disease, ddd, Endometriosis, Hip pain, Hyperlipidemia, MRSA (methicillin resistant staph aureus) culture positive, Myocardial infarct (HCC), Obesity, and Pneumonia.  she has a past surgical history that includes Tubal ligation; Tonsillectomy; Bunionectomy; Hemorrhoid surgery; Pilonidal cyst drainage; LEEP; Pain management procedure (Bilateral, 2022); Carpal tunnel release (Right); Coronary artery bypass graft (N/A, 2023); Colonoscopy (2020); [facility] US GUIDED BREAST BIOPSY W LOC DEVICE 1ST LESION LEFT (Left, 2025); [facility]

## 2025-07-08 ENCOUNTER — OFFICE VISIT (OUTPATIENT)
Dept: SURGERY | Age: 55
End: 2025-07-08

## 2025-07-08 VITALS
OXYGEN SATURATION: 94 % | RESPIRATION RATE: 18 BRPM | SYSTOLIC BLOOD PRESSURE: 126 MMHG | DIASTOLIC BLOOD PRESSURE: 64 MMHG | HEART RATE: 61 BPM | TEMPERATURE: 97.4 F

## 2025-07-08 DIAGNOSIS — Z17.0 MALIGNANT NEOPLASM OF UPPER-OUTER QUADRANT OF LEFT BREAST IN FEMALE, ESTROGEN RECEPTOR POSITIVE (HCC): ICD-10-CM

## 2025-07-08 DIAGNOSIS — Z09 POSTOP CHECK: Primary | ICD-10-CM

## 2025-07-08 DIAGNOSIS — C50.412 MALIGNANT NEOPLASM OF UPPER-OUTER QUADRANT OF LEFT BREAST IN FEMALE, ESTROGEN RECEPTOR POSITIVE (HCC): ICD-10-CM

## 2025-07-08 PROCEDURE — 99024 POSTOP FOLLOW-UP VISIT: CPT | Performed by: SURGERY

## 2025-07-08 RX ORDER — SULFAMETHOXAZOLE AND TRIMETHOPRIM 800; 160 MG/1; MG/1
1 TABLET ORAL 2 TIMES DAILY
Qty: 20 TABLET | Refills: 0 | Status: SHIPPED | OUTPATIENT
Start: 2025-07-08 | End: 2025-07-18

## 2025-07-08 NOTE — PROGRESS NOTES
90 tablet 3    aspirin 325 MG EC tablet Take 1 tablet by mouth daily 90 tablet 2    triamterene-hydroCHLOROthiazide (MAXZIDE-25) 37.5-25 MG per tablet Take 0.5 tablets by mouth every other day 45 tablet 0    pantoprazole (PROTONIX) 40 MG tablet Take 1 tablet by mouth every morning (before breakfast) 30 tablet 0    VENTOLIN  (90 Base) MCG/ACT inhaler inhale 2 puffs INTO THE LUNGS every 4 hours if needed for wheezing or shortness of breath 18 g 3    citalopram (CELEXA) 10 MG tablet Take 1 tablet by mouth daily       No current facility-administered medications on file prior to visit.       OBJECTIVE   CURRENT VITALS:  temporal temperature is 97.4 °F (36.3 °C). Her blood pressure is 126/64 and her pulse is 61. Her respiration is 18 and oxygen saturation is 94%.     Physical Exam  Constitutional:       Appearance: Normal appearance.   Chest:          Comments: Small punctate opening on most lateral aspect of the incsiion, no erytehma or induration   Neurological:      Mental Status: She is alert.            RESULTS      US PLACE BREAST LOC DEVICE 1ST LESION LEFT  Result Date: 6/13/2025  1. Successful left breast needle wire localization. 2. Post left breast mammographic images demonstrates the wire in the appropriate position. 3. Specimen radiograph demonstrates the wire, barbell biopsy clip and lesion within the specimen. The lesion was near the skin margin. These results were telephoned to the operating room. Please refer to operative report for further details. BI-RADS Final Assessment Category: Not applicable. Management Recommendation: Assess radiologic/pathologic concordance. **This report has been created using voice recognition software. It may contain minor errors which are inherent in voice recognition technology.** Electronically signed by Dr. Sebas Rizo Performing Facility: [facility] [address] [place] [address] Phone: [phone]     BREN

## 2025-07-11 RX ORDER — METOPROLOL TARTRATE 25 MG/1
25 TABLET, FILM COATED ORAL 2 TIMES DAILY
Qty: 180 TABLET | Refills: 1 | Status: SHIPPED | OUTPATIENT
Start: 2025-07-11

## 2025-07-14 ENCOUNTER — HOSPITAL ENCOUNTER (OUTPATIENT)
Dept: INFUSION THERAPY | Age: 55
Discharge: HOME | End: 2025-07-14
Payer: MEDICAID

## 2025-07-14 ENCOUNTER — HOSPITAL ENCOUNTER (OUTPATIENT)
Dept: RADIATION ONCOLOGY | Age: 55
Discharge: HOME | End: 2025-07-14
Payer: MEDICAID

## 2025-07-14 ENCOUNTER — OFFICE VISIT (OUTPATIENT)
Dept: ONCOLOGY | Age: 55
End: 2025-07-14
Payer: MEDICAID

## 2025-07-14 VITALS
HEART RATE: 61 BPM | TEMPERATURE: 97.8 F | DIASTOLIC BLOOD PRESSURE: 56 MMHG | HEIGHT: 59 IN | RESPIRATION RATE: 18 BRPM | OXYGEN SATURATION: 97 % | SYSTOLIC BLOOD PRESSURE: 113 MMHG | WEIGHT: 173 LBS | BODY MASS INDEX: 34.88 KG/M2

## 2025-07-14 VITALS
SYSTOLIC BLOOD PRESSURE: 102 MMHG | RESPIRATION RATE: 16 BRPM | DIASTOLIC BLOOD PRESSURE: 52 MMHG | OXYGEN SATURATION: 96 % | TEMPERATURE: 97.8 F | HEART RATE: 59 BPM

## 2025-07-14 VITALS
HEART RATE: 59 BPM | OXYGEN SATURATION: 96 % | RESPIRATION RATE: 16 BRPM | DIASTOLIC BLOOD PRESSURE: 52 MMHG | SYSTOLIC BLOOD PRESSURE: 102 MMHG | TEMPERATURE: 97.8 F | WEIGHT: 173 LBS | HEIGHT: 59 IN | BODY MASS INDEX: 34.88 KG/M2

## 2025-07-14 DIAGNOSIS — Z85.3 HISTORY OF LEFT BREAST CANCER: Primary | ICD-10-CM

## 2025-07-14 PROCEDURE — 99211 OFF/OP EST MAY X REQ PHY/QHP: CPT

## 2025-07-14 PROCEDURE — G8428 CUR MEDS NOT DOCUMENT: HCPCS | Performed by: INTERNAL MEDICINE

## 2025-07-14 PROCEDURE — 99204 OFFICE O/P NEW MOD 45 MIN: CPT | Performed by: INTERNAL MEDICINE

## 2025-07-14 PROCEDURE — 4004F PT TOBACCO SCREEN RCVD TLK: CPT | Performed by: INTERNAL MEDICINE

## 2025-07-14 PROCEDURE — G8417 CALC BMI ABV UP PARAM F/U: HCPCS | Performed by: INTERNAL MEDICINE

## 2025-07-14 PROCEDURE — G9899 SCRN MAM PERF RSLTS DOC: HCPCS | Performed by: INTERNAL MEDICINE

## 2025-07-14 PROCEDURE — 99202 OFFICE O/P NEW SF 15 MIN: CPT | Performed by: RADIOLOGY

## 2025-07-14 PROCEDURE — 3017F COLORECTAL CA SCREEN DOC REV: CPT | Performed by: INTERNAL MEDICINE

## 2025-07-14 PROCEDURE — 99205 OFFICE O/P NEW HI 60 MIN: CPT | Performed by: RADIOLOGY

## 2025-07-14 RX ORDER — OLANZAPINE 5 MG/1
5 TABLET, ORALLY DISINTEGRATING ORAL NIGHTLY
Qty: 4 TABLET | Refills: 5 | Status: SHIPPED | OUTPATIENT
Start: 2025-07-14 | End: 2025-07-18

## 2025-07-14 RX ORDER — ONDANSETRON 4 MG/1
4 TABLET, FILM COATED ORAL
Qty: 30 TABLET | Refills: 4 | Status: SHIPPED | OUTPATIENT
Start: 2025-07-14 | End: 2025-08-13

## 2025-07-14 RX ORDER — DEXAMETHASONE 4 MG/1
4 TABLET ORAL SEE ADMIN INSTRUCTIONS
Qty: 24 TABLET | Refills: 5 | Status: SHIPPED | OUTPATIENT
Start: 2025-07-14 | End: 2025-07-17

## 2025-07-14 RX ORDER — LIDOCAINE AND PRILOCAINE 25; 25 MG/G; MG/G
CREAM TOPICAL
Qty: 1 EACH | Refills: 3 | Status: SHIPPED | OUTPATIENT
Start: 2025-07-14

## 2025-07-14 ASSESSMENT — ENCOUNTER SYMPTOMS
BLOOD IN STOOL: 0
NAUSEA: 0
SHORTNESS OF BREATH: 0
DIARRHEA: 0
BACK PAIN: 1
ABDOMINAL PAIN: 0
TROUBLE SWALLOWING: 0
RECTAL PAIN: 0
COUGH: 0

## 2025-07-14 ASSESSMENT — PAIN DESCRIPTION - ORIENTATION: ORIENTATION: LEFT

## 2025-07-14 ASSESSMENT — PAIN DESCRIPTION - LOCATION: LOCATION: BREAST

## 2025-07-14 ASSESSMENT — PAIN SCALES - GENERAL: PAINLEVEL_OUTOF10: 3

## 2025-07-14 NOTE — PROGRESS NOTES
Radiation Oncology Consultation  Encounter Date: 2025     Ms. Clair Romero is a 54 y.o. female  : 1970  MRN: [id number]  [id number]  Requesting Provider: Dr. LUZ Koenig    Reason for request: ***      CONSULTANT: Dulce Maria Dover PhD, MD      CHIEF COMPLAINT:   DIAGNOSIS: ***      ASSESSMENT:  ***      PLAN:  ***      HISTORY OF PRESENT ILLNESS:   ***        Past Medical History:   Diagnosis Date    Abnormal Pap smear of cervix     Arthritis     CAD (coronary artery disease)     Chronic back pain     Chronic kidney disease     ddd     through out whole back    Endometriosis     Hip pain     Hyperlipidemia     Invasive ductal carcinoma of left breast (HCC) 2025    MRSA (methicillin resistant staph aureus) culture positive 2014    urine    Myocardial infarct (HCC)      ? pt was told she had a heart attack    Obesity 2015    Pneumonia      GYN History: : 3  Para: 3  Prior Oral contraceptive use: No  Prior Hormone replacement therapy: No  Menopausal Status: Post-Menopausal    Past Surgical History:   Procedure Laterality Date    BREAST LUMPECTOMY Left 2025    Left breast lumpectomy with preop needle localization with sentinel lymph node biopsy performed by Penny Koenig MD at [facility] OR    BUNIONECTOMY      CARPAL TUNNEL RELEASE Right     COLONOSCOPY  2020    Dr. Cardenas    CORONARY ARTERY BYPASS GRAFT N/A 2023    CABG X1 /ALBERTINA performed by Uzair Dunham MD at [facility] OR    HEMORRHOID SURGERY      LEEP      BREN NEEDLE BIOPSY LYMPH NODE SUPERFICIAL N/A 2025    [facility] NEEDLE BIOPSY LYMPH NODE SUPERFICIAL 2025 [facility] US GUID NDL BIOPSY LEFT Left 2025    [facility] US GUID NDL BIOPSY LEFT 2025 [facility]    PAIN MANAGEMENT PROCEDURE Bilateral 2022    Lumbar Facet Medial branch block Lumbar 4-5, 5-Sacral 1 bilateral performed by Da Salter MD at [facility] OR    PILONIDAL CYST DRAINAGE

## 2025-07-14 NOTE — PATIENT INSTRUCTIONS
Orders Placed This Encounter   Procedures    CBC with Auto Differential    Hepatic Function Panel    POC PANEL BMP W/Penny Davey MD, General Surgery, [place]     Orders Placed This Encounter   Medications    lidocaine-prilocaine (EMLA) 2.5-2.5 % cream     Sig: Apply topically as needed for each chemotherapy     Dispense:  1 each     Refill:  3    ondansetron (ZOFRAN) 4 MG tablet     Sig: Take 1 tablet by mouth every 4-6 hours as needed for Nausea or Vomiting     Dispense:  30 tablet     Refill:  4    OLANZapine zydis (ZYPREXA) 5 MG disintegrating tablet     Sig: Take 1 tablet by mouth nightly for 4 days After chemotherapy     Dispense:  4 tablet     Refill:  5    dexAMETHasone (DECADRON) 4 MG tablet     Sig: Take 1 tablet by mouth See Admin Instructions for 3 days Take 1 tablet by mouth every 12 hours Take one pill in AM and one pill in afternoon day before chemo( day 0), day 1,2 after chemotherapy     Dispense:  24 tablet     Refill:  5   TC x4 with G-CSF. ordered, ( chemotherapy) , please notify Ms. Najera for chemoteach and authorization.  She will talk to Ms. Marilee for transportation needs    Return in about 2 weeks (around 7/28/2025). MD+ labs+ treatment visit.

## 2025-07-14 NOTE — PROGRESS NOTES
[facility] [place] [facility]  [facility]  [address]  [address]  [place] [id number]  Dept: [phone]  Loc: [phone]   Hematology/Oncology Consult (Clinic)        7/14/25     Clair Romero   1970     Penny Koenig MD Webster, Julie, DO       Reason:  Chief Complaint   Patient presents with    New Patient     Malignant neoplasm of upper-outer quadrant of left breast in female, estrogen receptor positive      HPI:  Clair Romero is a 54 y.o. is a post menopausal female with left breast cancer, ER positive, PgR positive, Her2 jenny( +1) by IHC, status post lumpectomy and SLND( 0/4) LN negative. Stage IA (cT1c, cN0, cM0, G2, ER+, TX+, HER2-)  She experiences intermittent sharp, shooting pain at the surgical site on and off. Additionally, she mentions feeling fatigued and drained. She has an upcoming appointment with Dr. Koenig. She was prescribed antibiotics and has 2 days left of the course.She has night sweats have been an ongoing issue for an extended period. Occasionally, she experiences chest pain and shortness of breath, particularly when overexerting herself.  The discovery of a lump in her left breast the night before her mammogram led to a referral for further investigation. She has never undergone hormone replacement therapy and has been postmenopausal for the past 5 years.    Medical history: CAD status post CABG, chronic kidney disease.    PAST SURGICAL HISTORY:  Tubal ligation, tonsillectomy, bunion removal, LEEP proceduredue to precancerous cells on her cervix, CABG.    SOCIAL HISTORY:  The patient smokes less than half a pack a day. She does not drink alcohol or use recreational drugs.    FAMILY HISTORY:  Her father had lung cancer that spread to the brain. Two uncles on her mother's side and another uncle on her father's side had lung cancer; all were smokers.    HPI:   Oncology History   Malignant neoplasm of upper-outer quadrant of left breast

## 2025-07-15 ENCOUNTER — SOCIAL WORK (OUTPATIENT)
Dept: RADIATION ONCOLOGY | Age: 55
End: 2025-07-15

## 2025-07-15 ENCOUNTER — OFFICE VISIT (OUTPATIENT)
Dept: SURGERY | Age: 55
End: 2025-07-15
Payer: MEDICAID

## 2025-07-15 ENCOUNTER — TELEPHONE (OUTPATIENT)
Dept: SURGERY | Age: 55
End: 2025-07-15

## 2025-07-15 VITALS
DIASTOLIC BLOOD PRESSURE: 84 MMHG | OXYGEN SATURATION: 96 % | HEART RATE: 60 BPM | RESPIRATION RATE: 18 BRPM | TEMPERATURE: 97.2 F | SYSTOLIC BLOOD PRESSURE: 128 MMHG

## 2025-07-15 DIAGNOSIS — C50.412 MALIGNANT NEOPLASM OF UPPER-OUTER QUADRANT OF LEFT BREAST IN FEMALE, ESTROGEN RECEPTOR POSITIVE (HCC): Primary | ICD-10-CM

## 2025-07-15 DIAGNOSIS — Z17.0 MALIGNANT NEOPLASM OF UPPER-OUTER QUADRANT OF LEFT BREAST IN FEMALE, ESTROGEN RECEPTOR POSITIVE (HCC): Primary | ICD-10-CM

## 2025-07-15 PROCEDURE — 4004F PT TOBACCO SCREEN RCVD TLK: CPT | Performed by: SURGERY

## 2025-07-15 PROCEDURE — G9899 SCRN MAM PERF RSLTS DOC: HCPCS | Performed by: SURGERY

## 2025-07-15 PROCEDURE — 3017F COLORECTAL CA SCREEN DOC REV: CPT | Performed by: SURGERY

## 2025-07-15 PROCEDURE — G8427 DOCREV CUR MEDS BY ELIG CLIN: HCPCS | Performed by: SURGERY

## 2025-07-15 PROCEDURE — G8417 CALC BMI ABV UP PARAM F/U: HCPCS | Performed by: SURGERY

## 2025-07-15 PROCEDURE — 99213 OFFICE O/P EST LOW 20 MIN: CPT | Performed by: SURGERY

## 2025-07-15 NOTE — PROGRESS NOTES
Penny Koenig MD  [facility]  GENERAL SURGERY  General Surgery  Clinic  Note    Pt Name: Clair Romero  Medical Record Number: [id number]  Date of Birth 1970   Today's Date: 07/15/2025      ASSESSMENT/ PLAN:     Assessment & Plan  1. Breast cancer.   Cancer Staging   Malignant neoplasm of upper-outer quadrant of left breast in female, estrogen receptor positive (HCC)  Staging form: Breast, AJCC 8th Edition  - Clinical stage from 6/3/2025: Stage IA (cT1c, cN0, cM0, G2, ER+, NM+, HER2-) - Signed by Penny Koenig MD on 6/3/2025  - Pathologic stage from 6/19/2025: pT1c, pN0(sn), cM0 - Signed by Penny Koenig MD on 6/19/2025    The Oncotype score is 26, indicating a need for chemotherapy. The procedure for port placement was thoroughly explained, including the risks such as pneumothorax and the necessity of sedation during surgery. The port will remain in place throughout the duration of chemotherapy treatment and can be removed in the office once treatment is completed. It will need to be flushed periodically with heparin to prevent clotting. Scheduling of the procedure will be done at the patient's convenience.         SUBJECTIVE     History of Present Illness  The patient presents for evaluation of breast cancer.    She reports that her surgical incision has ceased draining. She is currently awaiting insurance approval and has been informed that she needs to undergo blood work. A follow-up appointment with another physician is scheduled for 07/28/2025, during which she will attend an educational class. She expresses a preference for radiation therapy over chemotherapy.            CURRENT MEDICATIONS     Current Outpatient Medications on File Prior to Visit   Medication Sig Dispense Refill    lidocaine-prilocaine (EMLA) 2.5-2.5 % cream Apply topically as needed for each chemotherapy 1 each 3    ondansetron (ZOFRAN) 4 MG tablet Take 1 tablet by mouth every 4-6 hours as needed for Nausea or Vomiting 30 tablet

## 2025-07-15 NOTE — PROGRESS NOTES
Oncology Social Work    Date: 7/15/2025  Time: 1:13 PM  Name: Clair Romero  MRN: [id number]     Contact Type: Distress Tool Follow-up    Note:   Situation: This staff called Clair Romero (goes by Rosmery) via phone support to introduce myself as the Oncology Social Worker.     Background: Rosmery was referred to this staff by the Radiation Dept after a recent appointment here. This staff was calling to review the Distress Thermometer provided during their visit.    Coping Score 5    Areas of Concern Identified    Physical Concern: Sleep disturbance and Fatigue or excessive sweating    Expressive Concern: None selected    Social Concern: None selected    Practical Concern: Transportation and Having enough food    Existential Concern: Fears of death, dying or afterlife and Pain may signify disease recurrence    Assessment: This staff had the opportunity to speak with Rosmery. She seemed pleasant as we discussed the call's purpose was to educate about the services provided by the . She had outstanding concerns that were addressed and further follow-up will be provided as her treatment plan is put into place.   - The opportunity to review her filed Advance Directive was offered. As first she stated Isai should not be her primary agent but later retreated and agreed to keep him as primary. We reviewed various scenarios about potential situations when a determination of care needs made. She agreed the other agents are correct, She agreed to picking up a Special Instructions worksheet for her to review with her agents to make sure everyone  is accurate according to her wishes.  - No community referrals were placed now because she is too early to know or understand what services she may need. Therefore, her referral services may be reconsidered should her needs regarding assistance change.    Recommendation: Follow-up will be initiated based on need.  provided my contact information and will remain available

## 2025-07-21 ENCOUNTER — CLINICAL DOCUMENTATION (OUTPATIENT)
Dept: INFUSION THERAPY | Age: 55
End: 2025-07-21

## 2025-07-21 ENCOUNTER — TELEPHONE (OUTPATIENT)
Dept: RADIATION ONCOLOGY | Age: 55
End: 2025-07-21

## 2025-07-21 RX ORDER — ALBUTEROL SULFATE 90 UG/1
4 INHALANT RESPIRATORY (INHALATION) PRN
OUTPATIENT
Start: 2025-07-21

## 2025-07-21 RX ORDER — HEPARIN 100 UNIT/ML
500 SYRINGE INTRAVENOUS PRN
OUTPATIENT
Start: 2025-07-21

## 2025-07-21 RX ORDER — ACETAMINOPHEN 325 MG/1
650 TABLET ORAL
OUTPATIENT
Start: 2025-07-21

## 2025-07-21 RX ORDER — SODIUM CHLORIDE 9 MG/ML
INJECTION, SOLUTION INTRAVENOUS PRN
OUTPATIENT
Start: 2025-07-21

## 2025-07-21 RX ORDER — DIPHENHYDRAMINE HYDROCHLORIDE 50 MG/ML
50 INJECTION, SOLUTION INTRAMUSCULAR; INTRAVENOUS
OUTPATIENT
Start: 2025-07-21

## 2025-07-21 RX ORDER — HYDROCORTISONE SODIUM SUCCINATE 100 MG/2ML
100 INJECTION INTRAMUSCULAR; INTRAVENOUS
OUTPATIENT
Start: 2025-07-21

## 2025-07-21 RX ORDER — SODIUM CHLORIDE 0.9 % (FLUSH) 0.9 %
5-40 SYRINGE (ML) INJECTION PRN
OUTPATIENT
Start: 2025-07-21

## 2025-07-21 RX ORDER — ONDANSETRON 2 MG/ML
8 INJECTION INTRAMUSCULAR; INTRAVENOUS
OUTPATIENT
Start: 2025-07-21

## 2025-07-21 RX ORDER — EPINEPHRINE 1 MG/ML
0.3 INJECTION, SOLUTION INTRAMUSCULAR; SUBCUTANEOUS PRN
OUTPATIENT
Start: 2025-07-21

## 2025-07-21 RX ORDER — SODIUM CHLORIDE 9 MG/ML
25 INJECTION, SOLUTION INTRAVENOUS PRN
OUTPATIENT
Start: 2025-07-21

## 2025-07-21 NOTE — TELEPHONE ENCOUNTER
Oncology Nutrition Screen      Contact Type: Phone    Contact Reason: High risk nutrition screen    PG-SGA screening form reviewed. Score = 11. Pt reports no appetite/no interest in eating, early satiety, pain, fatigue, consuming less than normal amounts of normal food, and feeling not my normal self, but able to be up and about with fairly normal activities.   RD referral/nutrition evaluation indicated for scores > 4.   Please consult oncology dietitian with any future nutrition concerns/needs. RD assessment to follow.    Talked with Rosmery on the phone. She has been eating less due to stress and depression about diagnosis. She knows that she needs to be eating though. Discussed ways to add calories to meals. Pt is on medicaid and has some food insecurity, uses food pantries occasionally. Uses medicaid transportation to get to medical appointments and the grocery store. Afraid to run out of trips sometimes. Pt has chipped teeth and pain in mouth. Eats softer foods. Discussed making own ensure smoothies or using carnation instant breakfast. Pt asked about foods with fiber. Chronic constipation, bowel movement every few days.     Recommendations:   Add oil to foods to add calories.   Eat small meals throughout the day.   Try carnation instant breakfast or making own smoothies. Choose high fiber foods like whole grains and fruit//vegetables    Follow-up: During chemo treatment    Fauzia Aranda RDN  Oncology Dietitian  [facility]  [phone]

## 2025-07-21 NOTE — PROGRESS NOTES
New chemotherapy validation note:    Diagnosis for chemotherapy: ER/HI+ HER2- Breast Cancer     Regimen ordered: Docetaxel/Cyclophosphamide      Reference or literature used for validation: NCCN      Date literature or guideline last updated 4/2025     Deviation from literature or guideline used: N/A    Summary of any verbal or telephone information obtained: N/A    Jimmie Ashby RPH, PharmD, [facility]  Clinical Pharmacy Specialist  7/21/2025 12:54 PM

## 2025-07-23 ENCOUNTER — ANESTHESIA EVENT (OUTPATIENT)
Dept: OPERATING ROOM | Age: 55
End: 2025-07-23
Payer: MEDICAID

## 2025-07-23 ENCOUNTER — HOSPITAL ENCOUNTER (OUTPATIENT)
Age: 55
Setting detail: OUTPATIENT SURGERY
Discharge: HOME | End: 2025-07-23
Attending: SURGERY | Admitting: SURGERY
Payer: MEDICAID

## 2025-07-23 ENCOUNTER — ANESTHESIA (OUTPATIENT)
Dept: OPERATING ROOM | Age: 55
End: 2025-07-23
Payer: MEDICAID

## 2025-07-23 ENCOUNTER — APPOINTMENT (OUTPATIENT)
Dept: GENERAL RADIOLOGY | Age: 55
End: 2025-07-23
Attending: SURGERY
Payer: MEDICAID

## 2025-07-23 VITALS
OXYGEN SATURATION: 94 % | TEMPERATURE: 96.5 F | RESPIRATION RATE: 20 BRPM | HEART RATE: 66 BPM | SYSTOLIC BLOOD PRESSURE: 118 MMHG | DIASTOLIC BLOOD PRESSURE: 56 MMHG

## 2025-07-23 DIAGNOSIS — C50.412 MALIGNANT NEOPLASM OF UPPER-OUTER QUADRANT OF LEFT FEMALE BREAST, UNSPECIFIED ESTROGEN RECEPTOR STATUS (HCC): Primary | ICD-10-CM

## 2025-07-23 PROCEDURE — 3600000002 HC SURGERY LEVEL 2 BASE: Performed by: SURGERY

## 2025-07-23 PROCEDURE — 71045 X-RAY EXAM CHEST 1 VIEW: CPT

## 2025-07-23 PROCEDURE — 6360000002 HC RX W HCPCS: Performed by: NURSE ANESTHETIST, CERTIFIED REGISTERED

## 2025-07-23 PROCEDURE — 36561 INSERT TUNNELED CV CATH: CPT | Performed by: SURGERY

## 2025-07-23 PROCEDURE — 6360000002 HC RX W HCPCS: Performed by: SURGERY

## 2025-07-23 PROCEDURE — 7100000010 HC PHASE II RECOVERY - FIRST 15 MIN: Performed by: SURGERY

## 2025-07-23 PROCEDURE — 6370000000 HC RX 637 (ALT 250 FOR IP): Performed by: SURGERY

## 2025-07-23 PROCEDURE — 3700000000 HC ANESTHESIA ATTENDED CARE: Performed by: SURGERY

## 2025-07-23 PROCEDURE — 3600000012 HC SURGERY LEVEL 2 ADDTL 15MIN: Performed by: SURGERY

## 2025-07-23 PROCEDURE — C1788 PORT, INDWELLING, IMP: HCPCS | Performed by: SURGERY

## 2025-07-23 PROCEDURE — 2580000003 HC RX 258: Performed by: SURGERY

## 2025-07-23 PROCEDURE — 2500000003 HC RX 250 WO HCPCS: Performed by: SURGERY

## 2025-07-23 PROCEDURE — 77001 FLUOROGUIDE FOR VEIN DEVICE: CPT | Performed by: SURGERY

## 2025-07-23 PROCEDURE — 77001 FLUOROGUIDE FOR VEIN DEVICE: CPT

## 2025-07-23 PROCEDURE — 2709999900 HC NON-CHARGEABLE SUPPLY: Performed by: SURGERY

## 2025-07-23 PROCEDURE — 7100000011 HC PHASE II RECOVERY - ADDTL 15 MIN: Performed by: SURGERY

## 2025-07-23 PROCEDURE — 3700000001 HC ADD 15 MINUTES (ANESTHESIA): Performed by: SURGERY

## 2025-07-23 DEVICE — PORT INFUS SGL LUMN ATTCH POLYUR OPN END CATH 8FR POWERPRT: Type: IMPLANTABLE DEVICE | Status: FUNCTIONAL

## 2025-07-23 RX ORDER — HYDROCODONE BITARTRATE AND ACETAMINOPHEN 5; 325 MG/1; MG/1
1 TABLET ORAL EVERY 6 HOURS PRN
Qty: 12 TABLET | Refills: 0 | Status: SHIPPED | OUTPATIENT
Start: 2025-07-23 | End: 2025-07-26

## 2025-07-23 RX ORDER — BUPIVACAINE HYDROCHLORIDE 5 MG/ML
INJECTION, SOLUTION PERINEURAL PRN
Status: DISCONTINUED | OUTPATIENT
Start: 2025-07-23 | End: 2025-07-23 | Stop reason: ALTCHOICE

## 2025-07-23 RX ORDER — MIDAZOLAM HYDROCHLORIDE 1 MG/ML
INJECTION, SOLUTION INTRAMUSCULAR; INTRAVENOUS
Status: DISCONTINUED | OUTPATIENT
Start: 2025-07-23 | End: 2025-07-23 | Stop reason: SDUPTHER

## 2025-07-23 RX ORDER — PROPOFOL 10 MG/ML
INJECTION, EMULSION INTRAVENOUS
Status: DISCONTINUED | OUTPATIENT
Start: 2025-07-23 | End: 2025-07-23 | Stop reason: SDUPTHER

## 2025-07-23 RX ORDER — HEPARIN 100 UNIT/ML
SYRINGE INTRAVENOUS PRN
Status: DISCONTINUED | OUTPATIENT
Start: 2025-07-23 | End: 2025-07-23 | Stop reason: ALTCHOICE

## 2025-07-23 RX ORDER — HYDROCODONE BITARTRATE AND ACETAMINOPHEN 5; 325 MG/1; MG/1
1 TABLET ORAL ONCE
Refills: 0 | Status: COMPLETED | OUTPATIENT
Start: 2025-07-23 | End: 2025-07-23

## 2025-07-23 RX ORDER — FENTANYL CITRATE 50 UG/ML
INJECTION, SOLUTION INTRAMUSCULAR; INTRAVENOUS
Status: DISCONTINUED | OUTPATIENT
Start: 2025-07-23 | End: 2025-07-23 | Stop reason: SDUPTHER

## 2025-07-23 RX ORDER — LIDOCAINE HYDROCHLORIDE AND EPINEPHRINE 10; 10 MG/ML; UG/ML
INJECTION, SOLUTION INFILTRATION; PERINEURAL PRN
Status: DISCONTINUED | OUTPATIENT
Start: 2025-07-23 | End: 2025-07-23 | Stop reason: ALTCHOICE

## 2025-07-23 RX ORDER — SODIUM CHLORIDE 9 MG/ML
INJECTION, SOLUTION INTRAVENOUS CONTINUOUS
Status: DISCONTINUED | OUTPATIENT
Start: 2025-07-23 | End: 2025-07-23 | Stop reason: HOSPADM

## 2025-07-23 RX ADMIN — FENTANYL CITRATE 25 MCG: 50 INJECTION, SOLUTION INTRAMUSCULAR; INTRAVENOUS at 08:35

## 2025-07-23 RX ADMIN — MIDAZOLAM 2 MG: 1 INJECTION INTRAMUSCULAR; INTRAVENOUS at 08:27

## 2025-07-23 RX ADMIN — FENTANYL CITRATE 50 MCG: 50 INJECTION, SOLUTION INTRAMUSCULAR; INTRAVENOUS at 08:29

## 2025-07-23 RX ADMIN — HYDROCODONE BITARTRATE AND ACETAMINOPHEN 1 TABLET: 5; 325 TABLET ORAL at 10:00

## 2025-07-23 RX ADMIN — SODIUM CHLORIDE: 0.9 INJECTION, SOLUTION INTRAVENOUS at 07:38

## 2025-07-23 RX ADMIN — PROPOFOL 20 MG: 10 INJECTION, EMULSION INTRAVENOUS at 08:50

## 2025-07-23 RX ADMIN — FENTANYL CITRATE 25 MCG: 50 INJECTION, SOLUTION INTRAMUSCULAR; INTRAVENOUS at 08:50

## 2025-07-23 RX ADMIN — WATER 2000 MG: 1 INJECTION INTRAMUSCULAR; INTRAVENOUS; SUBCUTANEOUS at 08:31

## 2025-07-23 RX ADMIN — PROPOFOL 50 MCG/KG/MIN: 10 INJECTION, EMULSION INTRAVENOUS at 08:29

## 2025-07-23 ASSESSMENT — PAIN DESCRIPTION - ONSET: ONSET: ON-GOING

## 2025-07-23 ASSESSMENT — PAIN DESCRIPTION - PAIN TYPE
TYPE: SURGICAL PAIN
TYPE: SURGICAL PAIN

## 2025-07-23 ASSESSMENT — PAIN - FUNCTIONAL ASSESSMENT
PAIN_FUNCTIONAL_ASSESSMENT: PREVENTS OR INTERFERES SOME ACTIVE ACTIVITIES AND ADLS
PAIN_FUNCTIONAL_ASSESSMENT: 0-10
PAIN_FUNCTIONAL_ASSESSMENT: PREVENTS OR INTERFERES SOME ACTIVE ACTIVITIES AND ADLS
PAIN_FUNCTIONAL_ASSESSMENT: ACTIVITIES ARE NOT PREVENTED

## 2025-07-23 ASSESSMENT — PAIN DESCRIPTION - DESCRIPTORS
DESCRIPTORS: ACHING

## 2025-07-23 ASSESSMENT — PAIN DESCRIPTION - ORIENTATION
ORIENTATION: RIGHT
ORIENTATION: RIGHT

## 2025-07-23 ASSESSMENT — PAIN SCALES - GENERAL
PAINLEVEL_OUTOF10: 0
PAINLEVEL_OUTOF10: 6
PAINLEVEL_OUTOF10: 4
PAINLEVEL_OUTOF10: 6

## 2025-07-23 ASSESSMENT — PAIN DESCRIPTION - LOCATION
LOCATION: CHEST
LOCATION: CHEST

## 2025-07-23 ASSESSMENT — ENCOUNTER SYMPTOMS: SHORTNESS OF BREATH: 1

## 2025-07-23 NOTE — H&P
Penny Koenig MD  [facility]  General Surgery  Clinic  Note    Pt Name: Clair Romero  Medical Record Number: [id number]  Date of Birth 1970   Today's Date: 07/15/2025      ASSESSMENT/ PLAN:     Assessment & Plan  1. Breast cancer.   Cancer Staging   Malignant neoplasm of upper-outer quadrant of left breast in female, estrogen receptor positive (HCC)  Staging form: Breast, AJCC 8th Edition  - Clinical stage from 6/3/2025: Stage IA (cT1c, cN0, cM0, G2, ER+, NE+, HER2-) - Signed by Penny Koenig MD on 6/3/2025  - Pathologic stage from 6/19/2025: pT1c, pN0(sn), cM0 - Signed by Penny Koenig MD on 6/19/2025    The Oncotype score is 26, indicating a need for chemotherapy. The procedure for port placement was thoroughly explained, including the risks such as pneumothorax and the necessity of sedation during surgery. The port will remain in place throughout the duration of chemotherapy treatment and can be removed in the office once treatment is completed. It will need to be flushed periodically with heparin to prevent clotting. Scheduling of the procedure will be done at the patient's convenience.         SUBJECTIVE     History of Present Illness  The patient presents for evaluation of breast cancer.    She reports that her surgical incision has ceased draining. She is currently awaiting insurance approval and has been informed that she needs to undergo blood work. A follow-up appointment with another physician is scheduled for 07/28/2025, during which she will attend an educational class. She expresses a preference for radiation therapy over chemotherapy.            CURRENT MEDICATIONS     No current facility-administered medications on file prior to encounter.     Current Outpatient Medications on File Prior to Encounter   Medication Sig Dispense Refill    lidocaine-prilocaine (EMLA) 2.5-2.5 % cream Apply topically as needed for each chemotherapy 1 each 3    ondansetron (ZOFRAN) 4 MG tablet Take

## 2025-07-23 NOTE — DISCHARGE INSTRUCTIONS
DR. ESPINAL'S DISCHARGE INSTRUCTIONS    Pt Name: Clair Romero  Medical Record Number: [id number]  Today's Date: 7/23/2025  GENERAL ANESTHESIA OR SEDATION   1. Do not drive or operate hazardous machinery for 24 hours.  2. Do not make important business or personal decisions for 24 hours.  3. Do not drink alcoholic beverages or use tobacco for 24 hours.  ACTIVITY INSTRUCTIONS   Rest today. Resume light to normal activity tomorrow.  You may resume normal activity tomorrow. Do not engage in strenuous activity that may place stress on your incision.  Do not drive for 3-5 days and avoid heavy lifting, tugging, pullings greater than 10-20 lbs until seen in the office.    DIET INSTRUCTIONS   Begin with clear liquids. If not nauseated, may increase to a low-fat diet when you desire. Greasy and spicy foods are not advised.  Regular diet as tolerated.  MEDICATIONS   Prescription written for norco. Take as directed.  Do not drink alcohol or drive while taking pain medications. You may experience dizziness or drowsiness with these medications. You may also experience constipation which can be relieved with stool softners or laxatives.  You may resume your daily prescription medication schedule unless otherwise specified.  Do not take 325mg Aspirin or other blood thinners such as Coumadin or Plavix for 5 days.  WOUND & DRESSING INSTRUCTIONS   Always ensure you and your care giver clean hands before and after caring for the wound.  Keep dressing clean and dry for 48 hours. Change when soiled or wet.      Allow steri-strips to fall off on their own if present, allow surgical glue to peel off on its own  Ice operative site for 20 minutes 4 times a day.     May wash over incision in shower in 48 hours, but do not soak in a bath.  Take sitz bath for 20 minutes twice daily and after bowel movements.  Keep the abdominal binder in place during the day. May remove to shower and at night.  Remove packing from wound in 24 hours and  DVT ppx coumadin 3 mg tonight INR 1.9, no plan for cath   s/p full dose Lovenox x2 renally dosed on 6/21 and 6/22

## 2025-07-23 NOTE — ANESTHESIA POSTPROCEDURE EVALUATION
Department of Anesthesiology  Postprocedure Note    Patient: Clair Romero  MRN: [id number]  YOB: 1970  Date of evaluation: 7/23/2025    Procedure Summary       Date: 07/23/25 Room / Location: [facility] OR  / [facility] OR    Anesthesia Start: 0825 Anesthesia Stop: 0917    Procedure: Right possible left single lumen mediport insertion (Right) Diagnosis:       Malignant neoplasm of upper-outer quadrant of left female breast (HCC)      (Malignant neoplasm of upper-outer quadrant of left female breast (HCC) [C50.412])    Surgeons: Penny Koenig MD Responsible Provider: Meliton Fairchild DO    Anesthesia Type: MAC ASA Status: 3            Anesthesia Type: No value filed.    Marlin Phase I: Marlin Score: 10    Marlin Phase II: Marlin Score: 10    Anesthesia Post Evaluation    Patient location during evaluation: bedside  Patient participation: complete - patient participated  Level of consciousness: awake  Airway patency: patent  Nausea & Vomiting: no nausea  Cardiovascular status: hemodynamically stable  Respiratory status: acceptable  Hydration status: stable  Pain management: adequate    No notable events documented.

## 2025-07-23 NOTE — ANESTHESIA PRE PROCEDURE
NPO Status: Time of last liquid consumption: 0400 (1 cup of black coffee, denies any cream or sugar)                        Time of last solid consumption: 2130                        Date of last liquid consumption: 07/23/25                        Date of last solid food consumption: 07/22/25    BMI:   Wt Readings from Last 3 Encounters:   07/14/25 78.5 kg (173 lb)   07/14/25 78.5 kg (173 lb)   06/19/25 77.1 kg (170 lb)     There is no height or weight on file to calculate BMI.    CBC:   Lab Results   Component Value Date/Time    WBC 6.9 05/21/2025 12:56 PM    RBC 4.79 05/21/2025 12:56 PM    HGB 14.2 05/21/2025 12:56 PM    HGB 14.2 05/21/2025 12:56 PM    HCT 42.3 05/21/2025 12:56 PM    HCT 42.9 05/21/2025 12:56 PM    MCV 88.3 05/21/2025 12:56 PM    RDW 12.2 09/20/2022 04:22 PM     05/21/2025 12:56 PM       CMP:   Lab Results   Component Value Date/Time     05/21/2025 12:56 PM    K 4.8 05/21/2025 12:56 PM     05/21/2025 12:56 PM    CO2 25 05/21/2025 12:56 PM    BUN 14 05/21/2025 12:56 PM    CREATININE 1.0 05/21/2025 12:56 PM    GFRAA >60 09/20/2022 04:22 PM    LABGLOM 67 05/21/2025 12:56 PM    LABGLOM >60 09/12/2023 11:44 AM    GLUCOSE 95 05/21/2025 12:56 PM    CALCIUM 9.5 05/21/2025 12:56 PM    BILITOT 0.4 05/21/2025 12:56 PM    ALKPHOS 94 05/21/2025 12:56 PM    AST 19 05/21/2025 12:56 PM    ALT 13 05/21/2025 12:56 PM       POC Tests: No results for input(s): \"POCGLU\", \"POCNA\", \"POCK\", \"POCCL\", \"POCBUN\", \"POCHEMO\", \"POCHCT\" in the last 72 hours.    Coags:   Lab Results   Component Value Date/Time    PROTIME 9.4 06/05/2014 12:06 PM    INR 1.24 08/20/2023 05:25 AM    APTT 29.8 04/05/2023 11:01 AM       HCG (If Applicable):   Lab Results   Component Value Date    PREGTESTUR negative 10/16/2015    PREGSERUM NEGATIVE 06/08/2018        ABGs: No results found for: \"PHART\", \"PO2ART\", \"XZU2IKC\", \"AKZ1ASZ\", \"BEART\", \"Z2GMGYHB\"     Type & Screen (If Applicable):  Lab Results   Component Value Date

## 2025-07-23 NOTE — PROGRESS NOTES
Patient oriented to Same Day department and admitted to Same Day Surgery room 14.   Patient verbalized approval for first name, last initial with physician name on unit whiteboard.     Plan of care reviewed with patient.   Patient room whiteboard filled out and discussed with patient and responsible adult.   Patient and responsible adult offered Same Day Welcome Packet to review.    Call light in reach.   Bed in lowest position, locked, with one bed rail up.   SCDs and warming blanket in place.  Appropriate arm bands on patient.   Bathroom offered.   All questions and concerns of patient addressed.        Meds to Beds:   Patient informed of [facility] Meds to Beds program during admission. Patient is agreeable to program.   Contact information for the pharmacy and the Meds to Beds program:   Name: Isai   Relationship to patient:spouse/significant other   Phone number: [phone]

## 2025-07-23 NOTE — OP NOTE
[facility]  RECORD OF OPERATION  PATIENT NAME: Clair Romero               MEDICAL RECORD NO. [id number]                DATE OF PROCEDURE: 7/23/2025  SURGEON: Penny Koenig   PRIMARY CARE PHYSICIAN: Dania Solorio DO     PREOPERATIVE DIAGNOSIS:  Need for IV access for chemotherapy and fitting/ adjustment of catheter.  POSTOPERATIVE DIAGNOSIS:  Same  PROCEDURE PERFORMED: Right subclavian 8 [place] single lumen port  insertion with fluoroscopic assistance.   SURGEON:  Dr. Penny Koenig   ANESTHESIA:MAC    Findings:  Present At Time Of Surgery (PATOS) (choose all levels that have infection present):  No infection present  Other Findings: catheter terminating in the caval atrial junction      DISCUSSION:  Clair is a 54 y.o.-year-old female who has a diagnosis of breast cancer  and is to undergo chemotherapy and required semipermanent IV access for therapy.  After a history and physical examination was performed, potential diagnostic and therapeutic modalities were discussed with the patient.  Variations of IV access techniques were discussed.  The risks, complications and benefits were reviewed. She was given the opportunity to ask questions.  Once answered, informed consent was obtained. She was brought to the operating on 7/23/2025  procedure.     PROCEDURE:  The patient was brought to the operating room, placed in the supine position, placed under continuous cardiac telemetry, blood pressure, pulse oximetry monitoring. Placed under IV sedation with the Anesthesia department. The Right subclavian region was prepped and draped in the sterile fashion.  The infraclavicular region was infiltrated with local anesthetic and through the anesthetized region. An introducer needle was inserted into the subclavian vein returning dark red, non-pulsatile blood.  The guidewire was placed with use of the needle and directed into the superior vena cava via fluoroscopic guidance.  A transverse incision was then made

## 2025-07-23 NOTE — PROGRESS NOTES

## 2025-07-23 NOTE — PROGRESS NOTES
Petra Suze presents today for   Chief Complaint   Patient presents with    Follow-up                 1. \"Have you been to the ER, urgent care clinic since your last visit? Hospitalized since your last visit? \" no    2. \"Have you seen or consulted any other health care providers outside of the [address] [place] [address] since your last visit? \" no     3. For patients aged 39-70: Has the patient had a colonoscopy / FIT/ Cologuard? NA - based on age      If the patient is female:    4. For patients aged 41-77: Has the patient had a mammogram within the past 2 years? NA - based on age or sex      11. For patients aged 21-65: Has the patient had a pap smear?  NA - based on age or sex Pt returned to [facility] room 14. Vitals and assessment as charted. 0.9 infusing, 100 ml to count from PACU. Pt has a snack and drink. Family at the bedside. Pt and family verbalized understanding of discharge criteria and call light use. Call light in reach.

## 2025-07-24 ENCOUNTER — HOSPITAL ENCOUNTER (OUTPATIENT)
Dept: INFUSION THERAPY | Age: 55
Discharge: HOME | End: 2025-07-24
Payer: MEDICAID

## 2025-07-24 PROCEDURE — 99212 OFFICE O/P EST SF 10 MIN: CPT

## 2025-07-24 NOTE — PROGRESS NOTES
Chemotherapy/Immunotherapy Teaching Checklist    Treatment Plan: Taxotere/Cytoxan with Fulphila  Frequency: 21 days  Patient accompanied by spouse      Day of chemo instructions:  [x] Must have    [x] Eat light breakfast  [x] Bring pain medications/other routine medications scheduled during appointment time  [] Hold blood pressure medications morning of treatment    Treatment process:  [x] Flow of appointment  [x] IV access Peripheral start  or  PORT access process [x] EMLA cream  [x] Premedication/ Hydration  [x] Length of treatment  [x] Tour of clinic    Diet and hydration:  [x] Discuss [place] diet    [x] Eating Hints book provided  [x] Importance of hydration 48- 64 ounces daily   [x] Hydration handout provided     Side Effects:  [x] Chemotherapy and you book provided  [x] Side effects and management discussed   [x] Diarrhea[x]Nausea/Vomiting []home antiemetic [x]hair loss[x]Neuropathy[x] Constipation[x] Fatigue[x]Mouth sores[x] Dehydration[x] Skin/Nail Changes []Pneumonitis []Colitis [] Hepatitis []Thyroid changes  []Fertility issues (birth control, sperm/egg banking issues)    Labs:  [x]BMP- renal function and electrolytes discussed  [x] CBC- RBC, WBC with ANC, platelet counts discussed  [x]Understanding your Blood hand out given   [x]Neutropenia handout/Reduce infection handout [x]Thrombocytopenia handout   [x]John discussed    Complications that require immediate attention from physician:  [x]Fever 100.3 [x]signs of infection- chills, fever, burning with urination, worsening cough   [x]Uncontrolled vomiting [x]Uncontrolled diarrhea/constipation   [x]Unable to drink 48 ounces [x]Uncontrolled pain  [x] When to notify provider handout given  [x] After hours contact process discussed    Home instructions:  [x] Instruct to shut the lid and double flush toilet for 48 hours after chemotherapy  [x] Instruct family members to wear gloves when will be handling  body fluids    Support Services:  [] Financial

## 2025-07-24 NOTE — PLAN OF CARE
Problem: Discharge Planning  Goal: Discharge to home or other facility with appropriate resources  Outcome: Adequate for Discharge  Flowsheets (Taken 7/24/2025 1710)  Discharge to home or other facility with appropriate resources: Identify barriers to discharge with patient and caregiver     Problem: Safety - Adult  Goal: Free from fall injury  Outcome: Adequate for Discharge  Flowsheets (Taken 7/24/2025 1710)  Free From Fall Injury: Based on caregiver fall risk screen, instruct family/caregiver to ask for assistance with transferring infant if caregiver noted to have fall risk factors     Problem: Chronic Conditions and Co-morbidities  Goal: Patient's chronic conditions and co-morbidity symptoms are monitored and maintained or improved  Outcome: Adequate for Discharge  Flowsheets (Taken 7/24/2025 1710)  Care Plan - Patient's Chronic Conditions and Co-Morbidity Symptoms are Monitored and Maintained or Improved: Collaborate with multidisciplinary team to address chronic and comorbid conditions and prevent exacerbation or deterioration

## 2025-07-28 ENCOUNTER — SOCIAL WORK (OUTPATIENT)
Dept: INFUSION THERAPY | Age: 55
End: 2025-07-28

## 2025-07-28 ENCOUNTER — HOSPITAL ENCOUNTER (OUTPATIENT)
Dept: INFUSION THERAPY | Age: 55
Discharge: HOME | End: 2025-07-28
Payer: MEDICAID

## 2025-07-28 ENCOUNTER — OFFICE VISIT (OUTPATIENT)
Dept: ONCOLOGY | Age: 55
End: 2025-07-28
Payer: MEDICAID

## 2025-07-28 VITALS
HEART RATE: 54 BPM | OXYGEN SATURATION: 92 % | RESPIRATION RATE: 16 BRPM | TEMPERATURE: 97.8 F | DIASTOLIC BLOOD PRESSURE: 54 MMHG | SYSTOLIC BLOOD PRESSURE: 104 MMHG

## 2025-07-28 DIAGNOSIS — C50.412 MALIGNANT NEOPLASM OF UPPER-OUTER QUADRANT OF LEFT BREAST IN FEMALE, ESTROGEN RECEPTOR POSITIVE (HCC): Primary | ICD-10-CM

## 2025-07-28 DIAGNOSIS — Z17.0 MALIGNANT NEOPLASM OF UPPER-OUTER QUADRANT OF LEFT BREAST IN FEMALE, ESTROGEN RECEPTOR POSITIVE (HCC): Primary | ICD-10-CM

## 2025-07-28 DIAGNOSIS — C50.412 MALIGNANT NEOPLASM OF UPPER-OUTER QUADRANT OF LEFT BREAST IN FEMALE, ESTROGEN RECEPTOR POSITIVE (HCC): ICD-10-CM

## 2025-07-28 DIAGNOSIS — Z85.3 HISTORY OF LEFT BREAST CANCER: ICD-10-CM

## 2025-07-28 DIAGNOSIS — Z17.0 MALIGNANT NEOPLASM OF UPPER-OUTER QUADRANT OF LEFT BREAST IN FEMALE, ESTROGEN RECEPTOR POSITIVE (HCC): ICD-10-CM

## 2025-07-28 LAB
ALBUMIN SERPL BCG-MCNC: 4.1 G/DL (ref 3.4–4.9)
ALP SERPL-CCNC: 84 U/L (ref 38–126)
ALT SERPL W/O P-5'-P-CCNC: 14 U/L (ref 10–35)
AST SERPL-CCNC: 14 U/L (ref 10–35)
BASOPHILS ABSOLUTE: 0 THOU/MM3 (ref 0–0.1)
BASOPHILS NFR BLD AUTO: 0 % (ref 0–3)
BILIRUB CONJ SERPL-MCNC: < 0.1 MG/DL (ref 0–0.2)
BILIRUB SERPL-MCNC: 0.3 MG/DL (ref 0.3–1.2)
BUN BLDP-MCNC: 14 MG/DL (ref 8–26)
CHLORIDE BLD-SCNC: 104 MEQ/L (ref 98–109)
CREAT BLD-MCNC: 0.5 MG/DL (ref 0.5–1.2)
EOSINOPHIL NFR BLD AUTO: 0 % (ref 0–4)
EOSINOPHILS ABSOLUTE: 0 THOU/MM3 (ref 0–0.4)
ERYTHROCYTE [DISTWIDTH] IN BLOOD BY AUTOMATED COUNT: 11.9 % (ref 11.5–14.5)
GFR SERPL CREATININE-BSD FRML MDRD: > 90 ML/MIN/1.73M2
GLUCOSE BLD-MCNC: 155 MG/DL (ref 70–108)
HBV SURFACE AB TITR SER: < 3.5 MIU/ML
HBV SURFACE AG SERPL QL IA: NONREACTIVE
HCT VFR BLD AUTO: 39.9 % (ref 37–47)
HGB BLD-MCNC: 13.6 GM/DL (ref 12–16)
IMMATURE GRANULOCYTES %: 0 %
IMMATURE GRANULOCYTES ABSOLUTE: 0.03 THOU/MM3 (ref 0–0.07)
IONIZED CALCIUM, WHOLE BLOOD: 1.25 MMOL/L (ref 1.12–1.32)
LYMPHOCYTES ABSOLUTE: 1.2 THOU/MM3 (ref 1–4.8)
LYMPHOCYTES NFR BLD AUTO: 11 % (ref 15–47)
MCH RBC QN AUTO: 29.5 PG (ref 26–33)
MCHC RBC AUTO-ENTMCNC: 34.1 GM/DL (ref 32.2–35.5)
MCV RBC AUTO: 87 FL (ref 81–99)
MONOCYTES ABSOLUTE: 0.4 THOU/MM3 (ref 0.4–1.3)
MONOCYTES NFR BLD AUTO: 3 % (ref 0–12)
NEUTROPHILS ABSOLUTE: 9 THOU/MM3 (ref 1.8–7.7)
NEUTROPHILS NFR BLD AUTO: 85 % (ref 43–75)
PLATELET # BLD AUTO: 219 THOU/MM3 (ref 130–400)
PMV BLD AUTO: 9.4 FL (ref 9.4–12.4)
POTASSIUM BLD-SCNC: 3.5 MEQ/L (ref 3.5–4.9)
PROT SERPL-MCNC: 7.1 G/DL (ref 6.4–8.3)
RBC # BLD AUTO: 4.61 MILL/MM3 (ref 4.2–5.4)
SODIUM BLD-SCNC: 138 MEQ/L (ref 138–146)
TOTAL CO2, WHOLE BLOOD: 26 MEQ/L (ref 23–33)
WBC # BLD AUTO: 10.6 THOU/MM3 (ref 4.8–10.8)

## 2025-07-28 PROCEDURE — 96367 TX/PROPH/DG ADDL SEQ IV INF: CPT

## 2025-07-28 PROCEDURE — 99211 OFF/OP EST MAY X REQ PHY/QHP: CPT

## 2025-07-28 PROCEDURE — G8428 CUR MEDS NOT DOCUMENT: HCPCS | Performed by: INTERNAL MEDICINE

## 2025-07-28 PROCEDURE — 96417 CHEMO IV INFUS EACH ADDL SEQ: CPT

## 2025-07-28 PROCEDURE — 86706 HEP B SURFACE ANTIBODY: CPT

## 2025-07-28 PROCEDURE — 6360000002 HC RX W HCPCS: Performed by: INTERNAL MEDICINE

## 2025-07-28 PROCEDURE — 85025 COMPLETE CBC W/AUTO DIFF WBC: CPT

## 2025-07-28 PROCEDURE — 99214 OFFICE O/P EST MOD 30 MIN: CPT | Performed by: INTERNAL MEDICINE

## 2025-07-28 PROCEDURE — 2500000003 HC RX 250 WO HCPCS: Performed by: INTERNAL MEDICINE

## 2025-07-28 PROCEDURE — 87340 HEPATITIS B SURFACE AG IA: CPT

## 2025-07-28 PROCEDURE — 4004F PT TOBACCO SCREEN RCVD TLK: CPT | Performed by: INTERNAL MEDICINE

## 2025-07-28 PROCEDURE — 80076 HEPATIC FUNCTION PANEL: CPT

## 2025-07-28 PROCEDURE — 96375 TX/PRO/DX INJ NEW DRUG ADDON: CPT

## 2025-07-28 PROCEDURE — 96413 CHEMO IV INFUSION 1 HR: CPT

## 2025-07-28 PROCEDURE — 2580000003 HC RX 258: Performed by: INTERNAL MEDICINE

## 2025-07-28 PROCEDURE — 3017F COLORECTAL CA SCREEN DOC REV: CPT | Performed by: INTERNAL MEDICINE

## 2025-07-28 PROCEDURE — G8417 CALC BMI ABV UP PARAM F/U: HCPCS | Performed by: INTERNAL MEDICINE

## 2025-07-28 PROCEDURE — G9899 SCRN MAM PERF RSLTS DOC: HCPCS | Performed by: INTERNAL MEDICINE

## 2025-07-28 PROCEDURE — 86704 HEP B CORE ANTIBODY TOTAL: CPT

## 2025-07-28 PROCEDURE — 80047 BASIC METABLC PNL IONIZED CA: CPT

## 2025-07-28 RX ORDER — SODIUM CHLORIDE 9 MG/ML
5-250 INJECTION, SOLUTION INTRAVENOUS PRN
Status: CANCELLED | OUTPATIENT
Start: 2025-07-28

## 2025-07-28 RX ORDER — SODIUM CHLORIDE 9 MG/ML
INJECTION, SOLUTION INTRAVENOUS PRN
OUTPATIENT
Start: 2025-07-28

## 2025-07-28 RX ORDER — HYDROCORTISONE SODIUM SUCCINATE 100 MG/2ML
100 INJECTION INTRAMUSCULAR; INTRAVENOUS
Status: CANCELLED | OUTPATIENT
Start: 2025-07-28

## 2025-07-28 RX ORDER — ALBUTEROL SULFATE 90 UG/1
4 INHALANT RESPIRATORY (INHALATION) PRN
Status: CANCELLED | OUTPATIENT
Start: 2025-07-28

## 2025-07-28 RX ORDER — HEPARIN 100 UNIT/ML
500 SYRINGE INTRAVENOUS PRN
OUTPATIENT
Start: 2025-07-28

## 2025-07-28 RX ORDER — SODIUM CHLORIDE 0.9 % (FLUSH) 0.9 %
5-40 SYRINGE (ML) INJECTION PRN
Status: DISCONTINUED | OUTPATIENT
Start: 2025-07-28 | End: 2025-07-29 | Stop reason: HOSPADM

## 2025-07-28 RX ORDER — ONDANSETRON 2 MG/ML
8 INJECTION INTRAMUSCULAR; INTRAVENOUS
Status: CANCELLED | OUTPATIENT
Start: 2025-07-28

## 2025-07-28 RX ORDER — MEPERIDINE HYDROCHLORIDE 50 MG/ML
12.5 INJECTION INTRAMUSCULAR; INTRAVENOUS; SUBCUTANEOUS PRN
Status: CANCELLED | OUTPATIENT
Start: 2025-07-28

## 2025-07-28 RX ORDER — EPINEPHRINE 1 MG/ML
0.3 INJECTION, SOLUTION INTRAMUSCULAR; SUBCUTANEOUS PRN
OUTPATIENT
Start: 2025-07-28

## 2025-07-28 RX ORDER — HEPARIN SODIUM (PORCINE) LOCK FLUSH IV SOLN 100 UNIT/ML 100 UNIT/ML
500 SOLUTION INTRAVENOUS PRN
Status: CANCELLED | OUTPATIENT
Start: 2025-07-28

## 2025-07-28 RX ORDER — HEPARIN 100 UNIT/ML
500 SYRINGE INTRAVENOUS PRN
Status: DISCONTINUED | OUTPATIENT
Start: 2025-07-28 | End: 2025-07-29 | Stop reason: HOSPADM

## 2025-07-28 RX ORDER — DIPHENHYDRAMINE HYDROCHLORIDE 50 MG/ML
50 INJECTION, SOLUTION INTRAMUSCULAR; INTRAVENOUS
OUTPATIENT
Start: 2025-07-28

## 2025-07-28 RX ORDER — HYDROCORTISONE SODIUM SUCCINATE 100 MG/2ML
100 INJECTION INTRAMUSCULAR; INTRAVENOUS
OUTPATIENT
Start: 2025-07-28

## 2025-07-28 RX ORDER — ACETAMINOPHEN 325 MG/1
650 TABLET ORAL
OUTPATIENT
Start: 2025-07-28

## 2025-07-28 RX ORDER — EPINEPHRINE 1 MG/ML
0.3 INJECTION, SOLUTION, CONCENTRATE INTRAVENOUS PRN
Status: CANCELLED | OUTPATIENT
Start: 2025-07-28

## 2025-07-28 RX ORDER — SODIUM CHLORIDE 9 MG/ML
5-250 INJECTION, SOLUTION INTRAVENOUS PRN
Status: DISCONTINUED | OUTPATIENT
Start: 2025-07-28 | End: 2025-07-29 | Stop reason: HOSPADM

## 2025-07-28 RX ORDER — FAMOTIDINE 10 MG/ML
20 INJECTION, SOLUTION INTRAVENOUS
Status: CANCELLED | OUTPATIENT
Start: 2025-07-28

## 2025-07-28 RX ORDER — ACETAMINOPHEN 325 MG/1
650 TABLET ORAL
Status: CANCELLED | OUTPATIENT
Start: 2025-07-28

## 2025-07-28 RX ORDER — SODIUM CHLORIDE 9 MG/ML
25 INJECTION, SOLUTION INTRAVENOUS PRN
OUTPATIENT
Start: 2025-07-28

## 2025-07-28 RX ORDER — ONDANSETRON 2 MG/ML
8 INJECTION INTRAMUSCULAR; INTRAVENOUS
OUTPATIENT
Start: 2025-07-28

## 2025-07-28 RX ORDER — ALBUTEROL SULFATE 90 UG/1
4 INHALANT RESPIRATORY (INHALATION) PRN
OUTPATIENT
Start: 2025-07-28

## 2025-07-28 RX ORDER — SODIUM CHLORIDE 9 MG/ML
INJECTION, SOLUTION INTRAVENOUS PRN
Status: CANCELLED | OUTPATIENT
Start: 2025-07-28

## 2025-07-28 RX ORDER — PALONOSETRON 0.05 MG/ML
0.25 INJECTION, SOLUTION INTRAVENOUS ONCE
Status: COMPLETED | OUTPATIENT
Start: 2025-07-28 | End: 2025-07-28

## 2025-07-28 RX ORDER — PALONOSETRON 0.05 MG/ML
0.25 INJECTION, SOLUTION INTRAVENOUS ONCE
Status: CANCELLED | OUTPATIENT
Start: 2025-07-28 | End: 2025-07-28

## 2025-07-28 RX ORDER — DIPHENHYDRAMINE HYDROCHLORIDE 50 MG/ML
50 INJECTION, SOLUTION INTRAMUSCULAR; INTRAVENOUS
Status: CANCELLED | OUTPATIENT
Start: 2025-07-28

## 2025-07-28 RX ORDER — SODIUM CHLORIDE 0.9 % (FLUSH) 0.9 %
5-40 SYRINGE (ML) INJECTION PRN
Status: CANCELLED | OUTPATIENT
Start: 2025-07-28

## 2025-07-28 RX ORDER — SODIUM CHLORIDE 0.9 % (FLUSH) 0.9 %
5-40 SYRINGE (ML) INJECTION PRN
OUTPATIENT
Start: 2025-07-28

## 2025-07-28 RX ADMIN — SODIUM CHLORIDE, PRESERVATIVE FREE 20 ML: 5 INJECTION INTRAVENOUS at 18:13

## 2025-07-28 RX ADMIN — SODIUM CHLORIDE 25 ML/HR: 0.9 INJECTION, SOLUTION INTRAVENOUS at 15:36

## 2025-07-28 RX ADMIN — DOCETAXEL ANHYDROUS 140 MG: 10 INJECTION, SOLUTION INTRAVENOUS at 16:15

## 2025-07-28 RX ADMIN — Medication 500 UNITS: at 18:13

## 2025-07-28 RX ADMIN — DEXAMETHASONE SODIUM PHOSPHATE 12 MG: 4 INJECTION, SOLUTION INTRAMUSCULAR; INTRAVENOUS at 15:47

## 2025-07-28 RX ADMIN — CYCLOPHOSPHAMIDE 1080 MG: 200 INJECTION, SOLUTION INTRAVENOUS at 17:24

## 2025-07-28 RX ADMIN — SODIUM CHLORIDE, PRESERVATIVE FREE 40 ML: 5 INJECTION INTRAVENOUS at 14:45

## 2025-07-28 RX ADMIN — PALONOSETRON 0.25 MG: 0.05 INJECTION, SOLUTION INTRAVENOUS at 15:42

## 2025-07-28 NOTE — PROGRESS NOTES
Patient tolerated Taxotere and Cytoxan without any complications.     Denies dizziness, lightheadedness, acute nausea or vomiting, headache, heart palpitations, rash/itching or increased SOB.      Patient instructed if they experience any of the above symptoms following today's visit, they are to notify the provider immediately or go to the Emergency Department.    Patient educated on monitoring temperatures at home daily and to call physician or go to the Emergency Department for temperatures of 100.4 F or greater.    Last vital signs:   /68   Pulse 68   Temp 98.1 °F (36.7 °C) (Oral)   Resp 18   Ht 1.956 m (6' 5\")   Wt 80.3 kg (177 lb)   SpO2 98%   BMI 20.99 kg/m²      Discharge instructions given to patient. Verbalizes understanding. Ambulated off unit per self in stable condition with belongings.

## 2025-07-28 NOTE — PATIENT INSTRUCTIONS
-Proceed with treatment today.  -Return tomorrow to the infusion center for growth factor shot.  -Return to clinic in 3 weeks for re-evaluation, blood work, and treatment consideration.  -Please call for an earlier evaluation if anything changes in the interim.

## 2025-07-28 NOTE — PROGRESS NOTES
Oncology Social Work    Date: 7/28/2025  Time: 3:56 PM  Name: Clair Romero  MRN: [id number]     Contact Type: Transportation Request    Note:   Situation: Clair Romero called the Oncology Social Worker to assist with transportation to and from appts.     Background:  Clair Romero is not able to provide their own transportation and has asked the  for assistance.     Assessment:  Clair Romero requested transportation for upcoming appointment(s) located throughout the [facility] network of providers.  - She will be picked up by [employer] and taken to/from scheduled appointment(s) as follows:  Appt Date Appt Time Dept  Location Notes   29-Jul 3:00p Med  [facility] In[facility]   - Stacy was encouraged to consider changing her insurance provider sine her existing provider may not be accepted by [facility].  - We also completed applications for [employer] Non-Emergency Transportation assistance so we can begin using [employer] as her transportation provider. She is also able to schedule her own future appts through this program that may or may not be related to her cancer care.   - I also shared with Stacy that I have coordinated services for backup transportation through [employer] but we will try to use the NET transport first in most cases.   - Patient has been notified of the arrangements provided.    Recommendation: Appt changes will be initiated by Clair Romero based on need.  provided my contact information and will remain available for support.      ADELFO Red, YAHAIRA, MELISSA  Oncology Social Worker      Electronically signed by ADELFO Red, YAHAIRA, MELISSA on 7/28/2025 at 3:56 PM

## 2025-07-28 NOTE — PROGRESS NOTES
Nutrition Assessment    Reason for Visit: On treatment Assessment    Nutrition Recommendations:   - Drink 2 Ensure Complete per day through cancer treatment    Malnutrition Assessment:   Malnutrition Status: no significant malnutrition  Context:  Findings of the 6 clinical characteristics of malnutrition (minimum of 2 out of 6 clinical characteristics is required to make the dx of moderate or severe Protein Calorie Malnutrition based on AND/ASPEN Guidelines):   1. Energy Intake: >75% of needs   2. Weight Loss: No significant weight loss   3. Fat Loss: No loss   4. Muscle Loss: No loss   5. Fluid Accumulation: none   6.  Strength: Not measured      Nutrition Assessment:   History:CKD, Heart attack (2020)  Subjective: 7/28/25 Met with pt during first chemo treatment. Pt has loss of appetite. Pt also struggles with food insecurity and often runs out of food at the end of the month or does not have access to grocery store. Disc getting ONS through insurance to support intake during treatment. Pt agreed. No other questions or concerns per pt. Will monitor throughout treatment.   Current Nutrition:   Oral Diet: Soft food   Oral Diet Intake:    >75% of needs    Oral Nutrition Supplement (ONS): None   ONS intake:      Anthropometric Measures:     Wt Readings from Last 20 Encounters:   07/14/25 78.5 kg (173 lb)   07/14/25 78.5 kg (173 lb)   06/19/25 77.1 kg (170 lb)   06/13/25 78 kg (172 lb)   06/03/25 78.1 kg (172 lb 3.2 oz)   05/21/25 78.5 kg (173 lb)   05/19/25 78.7 kg (173 lb 6.4 oz)   05/14/25 78.9 kg (174 lb)   04/17/25 77.7 kg (171 lb 6.4 oz)   10/02/24 81 kg (178 lb 9.2 oz)   09/18/24 80.6 kg (177 lb 12.8 oz)   05/04/24 77.6 kg (171 lb)   04/18/24 77.7 kg (171 lb 3.2 oz)   04/17/24 77.1 kg (170 lb)   12/20/23 79.2 kg (174 lb 9.6 oz)   10/17/23 78 kg (172 lb)   09/28/23 78.2 kg (172 lb 6.4 oz)   09/12/23 74.8 kg (164 lb 12.8 oz)   08/25/23 82.3 kg (181 lb 7 oz)   08/14/23 79 kg (174 lb 3.2 oz)       Usual

## 2025-07-28 NOTE — PROGRESS NOTES
[facility] [place] [facility]  [facility]  [address]  [address]  [place] [id number]  Dept: [phone]  Loc: [phone]   Hematology/Oncology Consult (Clinic)        7/14/25     Clair Rmoero   1970     No ref. provider found   Dania Solorio DO       Reason:  Chief Complaint   Patient presents with    Follow-up     History of left breast cancer      HPI:  Clair Romero is a 54 y.o. is a post menopausal female with left breast cancer, ER positive, PgR positive, Her2 jenny( +1) by IHC, status post lumpectomy and SLND( 0/4) LN negative. Stage IA (cT1c, cN0, cM0, G2, ER+, IN+, HER2-)  She experiences intermittent sharp, shooting pain at the surgical site on and off. Additionally, she mentions feeling fatigued and drained. She has an upcoming appointment with Dr. Koenig. She was prescribed antibiotics and has 2 days left of the course.She has night sweats have been an ongoing issue for an extended period. Occasionally, she experiences chest pain and shortness of breath, particularly when overexerting herself.  The discovery of a lump in her left breast the night before her mammogram led to a referral for further investigation. She has never undergone hormone replacement therapy and has been postmenopausal for the past 5 years.    Medical history: CAD status post CABG, chronic kidney disease.    PAST SURGICAL HISTORY:  Tubal ligation, tonsillectomy, bunion removal, LEEP proceduredue to precancerous cells on her cervix, CABG.    SOCIAL HISTORY:  The patient smokes less than half a pack a day. She does not drink alcohol or use recreational drugs.    FAMILY HISTORY:  Her father had lung cancer that spread to the brain. Two uncles on her mother's side and another uncle on her father's side had lung cancer; all were smokers.    Oncology History   Malignant neoplasm of upper-outer quadrant of left breast in female, estrogen receptor positive (HCC)   6/3/2025 -  Cancer Staged

## 2025-07-28 NOTE — PLAN OF CARE
Problem: Discharge Planning  Goal: Discharge to home or other facility with appropriate resources  Outcome: Adequate for Discharge  Flowsheets (Taken 7/28/2025 1842)  Discharge to home or other facility with appropriate resources: Identify barriers to discharge with patient and caregiver     Problem: Safety - Adult  Goal: Free from fall injury  Outcome: Adequate for Discharge  Flowsheets (Taken 7/28/2025 1842)  Free From Fall Injury: Instruct family/caregiver on patient safety     Problem: Chronic Conditions and Co-morbidities  Goal: Patient's chronic conditions and co-morbidity symptoms are monitored and maintained or improved  Outcome: Adequate for Discharge  Flowsheets (Taken 7/28/2025 1842)  Care Plan - Patient's Chronic Conditions and Co-Morbidity Symptoms are Monitored and Maintained or Improved: Monitor and assess patient's chronic conditions and comorbid symptoms for stability, deterioration, or improvement     Problem: Infection - Adult  Goal: Absence of infection at discharge  Outcome: Adequate for Discharge  Flowsheets (Taken 7/28/2025 1842)  Absence of infection at discharge:   Assess and monitor for signs and symptoms of infection   Monitor all insertion sites i.e., indwelling lines, tubes and drains   Instruct and encourage patient and family to use good hand hygiene technique       Care plan reviewed with patient.  Patient verbalizes understanding of the plan of care and contributes to goal setting.

## 2025-07-28 NOTE — DISCHARGE INSTRUCTIONS
Please contact your physician if you have any questions regarding the Taxotere and Cytoxan you received today.      Notify the office immediately or go to emergency department if you experience any of the following symptoms after today's treatment:    Chills,temperature of 100.4 or higher, or any symptoms of infection.  Dizziness/lightheadedness   Acute nausea or vomiting not relieved by medications  Diarrhea or constipation not controlled by medications  Headache or pain not relieved by medications  New chest pain or pressure  New rash /itching  New or increased shortness of breath  Unable to drink at least 48 ounces of water a day

## 2025-07-29 ENCOUNTER — HOSPITAL ENCOUNTER (OUTPATIENT)
Dept: INFUSION THERAPY | Age: 55
Discharge: HOME | End: 2025-07-29
Payer: MEDICAID

## 2025-07-29 ENCOUNTER — HOSPITAL ENCOUNTER (OUTPATIENT)
Dept: INFUSION THERAPY | Age: 55
End: 2025-07-29

## 2025-07-29 VITALS
HEART RATE: 54 BPM | SYSTOLIC BLOOD PRESSURE: 138 MMHG | OXYGEN SATURATION: 95 % | TEMPERATURE: 98.1 F | RESPIRATION RATE: 16 BRPM | DIASTOLIC BLOOD PRESSURE: 62 MMHG

## 2025-07-29 DIAGNOSIS — Z17.0 MALIGNANT NEOPLASM OF UPPER-OUTER QUADRANT OF LEFT BREAST IN FEMALE, ESTROGEN RECEPTOR POSITIVE (HCC): Primary | ICD-10-CM

## 2025-07-29 DIAGNOSIS — C50.412 MALIGNANT NEOPLASM OF UPPER-OUTER QUADRANT OF LEFT BREAST IN FEMALE, ESTROGEN RECEPTOR POSITIVE (HCC): Primary | ICD-10-CM

## 2025-07-29 LAB — HBV CORE IGG+IGM SERPL QL IA: NONREACTIVE

## 2025-07-29 PROCEDURE — 96372 THER/PROPH/DIAG INJ SC/IM: CPT

## 2025-07-29 PROCEDURE — 6360000002 HC RX W HCPCS: Performed by: INTERNAL MEDICINE

## 2025-07-29 RX ADMIN — PEGFILGRASTIM-JMDB 6 MG: 6 INJECTION SUBCUTANEOUS at 15:01

## 2025-07-29 NOTE — DISCHARGE INSTRUCTIONS
Please contact your provider if you have any questions regarding the Fulphila you received today.      Notify the office immediately or go to emergency department if you experience any of the following symptoms after today's treatment:    Chills, temperature of 100.4 or higher, or any symptoms of infection.  New Dizziness/lightheadedness   Acute nausea or vomiting not relieved by medications  Headache not relieved by medications  New chest pain or pressure  New rash /itching  New shortness of breath    Drink at least 48-64 ounces of water a day. Call if any questions or concerns

## 2025-07-29 NOTE — PROGRESS NOTES
Patient tolerated Fulphila without any complications. Instructed on medication indications and side effects. Voiced understanding. [facility] contacted for ride and are coming to  patient now. Notified patient.     Discharge instructions given to patient. Verbalized understanding. Ambulated off unit per self in stable condition with all belongings.

## 2025-07-29 NOTE — PLAN OF CARE
Problem: Discharge Planning  Goal: Discharge to home or other facility with appropriate resources  Outcome: Adequate for Discharge  Flowsheets (Taken 7/29/2025 1459)  Discharge to home or other facility with appropriate resources: Identify barriers to discharge with patient and caregiver  Note: Voiced understanding of discharge instructions, when to call the doctor, and follow-up appointments.       Problem: Safety - Adult  Goal: Free from fall injury  Outcome: Adequate for Discharge  Flowsheets (Taken 7/29/2025 1459)  Free From Fall Injury: Instruct family/caregiver on patient safety     Problem: Chronic Conditions and Co-morbidities  Goal: Patient's chronic conditions and co-morbidity symptoms are monitored and maintained or improved  Outcome: Adequate for Discharge  Flowsheets (Taken 7/29/2025 1459)  Care Plan - Patient's Chronic Conditions and Co-Morbidity Symptoms are Monitored and Maintained or Improved: Monitor and assess patient's chronic conditions and comorbid symptoms for stability, deterioration, or improvement  Note: Reviewed Fulphila indications,side effects, and when to notify physician of related complications.     Problem: Infection - Adult  Goal: Absence of infection at discharge  Outcome: Adequate for Discharge  Flowsheets (Taken 7/29/2025 1459)  Absence of infection at discharge:   Assess and monitor for signs and symptoms of infection   Monitor all insertion sites i.e., indwelling lines, tubes and drains   Instruct and encourage patient and family to use good hand hygiene technique     Care plan reviewed with patient.  Patient verbalizes understanding of the plan of care and contributes to goal setting.

## 2025-08-01 ENCOUNTER — CLINICAL DOCUMENTATION (OUTPATIENT)
Dept: INFUSION THERAPY | Age: 55
End: 2025-08-01

## 2025-08-01 NOTE — PROGRESS NOTES
Follow up chemotherapy call made. Rosmery received Cycle #1 of Taxotere/Cytoxan on 7/28/25. Denies any nausea, vomiting, diarrhea, constipation, shortness of breath, fever, or chills.  Rosmery does complain of bone achiness especially in legs from her Fulphila injection.  I asked if she was taking Claritin and Rosmery stated she was going to store today to pick it up and start.  Also told her to try Tylenol as needed.  Taste buds have been affected and appetite is fair. Drinking at least a 100+ oz of water daily and urinating well. Denies any further needs at this time.

## 2025-08-04 ENCOUNTER — TELEPHONE (OUTPATIENT)
Dept: ONCOLOGY | Age: 55
End: 2025-08-04

## 2025-08-05 ENCOUNTER — TELEPHONE (OUTPATIENT)
Dept: ONCOLOGY | Age: 55
End: 2025-08-05

## 2025-08-05 DIAGNOSIS — L29.89 PRURITIC ERYTHEMATOUS RASH: Primary | ICD-10-CM

## 2025-08-05 RX ORDER — PREDNISONE 50 MG/1
50 TABLET ORAL DAILY
Qty: 5 TABLET | Refills: 0 | Status: SHIPPED | OUTPATIENT
Start: 2025-08-05 | End: 2025-08-10

## 2025-08-05 RX ORDER — CETIRIZINE HYDROCHLORIDE 10 MG/1
10 TABLET ORAL DAILY
Qty: 14 TABLET | Refills: 0 | Status: SHIPPED | OUTPATIENT
Start: 2025-08-05 | End: 2025-08-19

## 2025-08-12 ENCOUNTER — SOCIAL WORK (OUTPATIENT)
Dept: INFUSION THERAPY | Age: 55
End: 2025-08-12

## 2025-08-18 ENCOUNTER — HOSPITAL ENCOUNTER (OUTPATIENT)
Dept: INFUSION THERAPY | Age: 55
Discharge: HOME | End: 2025-08-18
Payer: MEDICAID

## 2025-08-18 ENCOUNTER — OFFICE VISIT (OUTPATIENT)
Dept: ONCOLOGY | Age: 55
End: 2025-08-18
Payer: MEDICAID

## 2025-08-18 VITALS
SYSTOLIC BLOOD PRESSURE: 122 MMHG | WEIGHT: 169.6 LBS | OXYGEN SATURATION: 94 % | HEIGHT: 59 IN | TEMPERATURE: 98.1 F | BODY MASS INDEX: 34.19 KG/M2 | RESPIRATION RATE: 16 BRPM | DIASTOLIC BLOOD PRESSURE: 60 MMHG | HEART RATE: 60 BPM

## 2025-08-18 VITALS
SYSTOLIC BLOOD PRESSURE: 109 MMHG | WEIGHT: 169.6 LBS | DIASTOLIC BLOOD PRESSURE: 57 MMHG | TEMPERATURE: 98.1 F | OXYGEN SATURATION: 95 % | RESPIRATION RATE: 18 BRPM | BODY MASS INDEX: 34.19 KG/M2 | HEIGHT: 59 IN | HEART RATE: 73 BPM

## 2025-08-18 DIAGNOSIS — Z17.0 MALIGNANT NEOPLASM OF UPPER-OUTER QUADRANT OF LEFT BREAST IN FEMALE, ESTROGEN RECEPTOR POSITIVE (HCC): Primary | ICD-10-CM

## 2025-08-18 DIAGNOSIS — Z85.3 HISTORY OF LEFT BREAST CANCER: ICD-10-CM

## 2025-08-18 DIAGNOSIS — C50.412 MALIGNANT NEOPLASM OF UPPER-OUTER QUADRANT OF LEFT BREAST IN FEMALE, ESTROGEN RECEPTOR POSITIVE (HCC): Primary | ICD-10-CM

## 2025-08-18 DIAGNOSIS — Z51.11 ENCOUNTER FOR CHEMOTHERAPY MANAGEMENT: ICD-10-CM

## 2025-08-18 LAB
ALBUMIN SERPL BCG-MCNC: 3.8 G/DL (ref 3.4–4.9)
ALP SERPL-CCNC: 75 U/L (ref 38–126)
ALT SERPL W/O P-5'-P-CCNC: 20 U/L (ref 10–35)
AST SERPL-CCNC: 15 U/L (ref 10–35)
BASOPHILS ABSOLUTE: 0 THOU/MM3 (ref 0–0.1)
BASOPHILS NFR BLD AUTO: 0 % (ref 0–3)
BILIRUB CONJ SERPL-MCNC: < 0.1 MG/DL (ref 0–0.2)
BILIRUB SERPL-MCNC: 0.3 MG/DL (ref 0.3–1.2)
BUN BLDP-MCNC: 16 MG/DL (ref 8–26)
CHLORIDE BLD-SCNC: 103 MEQ/L (ref 98–109)
CREAT BLD-MCNC: 0.7 MG/DL (ref 0.5–1.2)
EOSINOPHIL NFR BLD AUTO: 0 % (ref 0–4)
EOSINOPHILS ABSOLUTE: 0 THOU/MM3 (ref 0–0.4)
ERYTHROCYTE [DISTWIDTH] IN BLOOD BY AUTOMATED COUNT: 13.2 % (ref 11.5–14.5)
GFR SERPL CREATININE-BSD FRML MDRD: > 90 ML/MIN/1.73M2
GLUCOSE BLD-MCNC: 313 MG/DL (ref 70–108)
HCT VFR BLD AUTO: 38 % (ref 37–47)
HGB BLD-MCNC: 13 GM/DL (ref 12–16)
IMMATURE GRANULOCYTES %: 1 %
IMMATURE GRANULOCYTES ABSOLUTE: 0.06 THOU/MM3 (ref 0–0.07)
IONIZED CALCIUM, WHOLE BLOOD: 1.24 MMOL/L (ref 1.12–1.32)
LYMPHOCYTES ABSOLUTE: 0.8 THOU/MM3 (ref 1–4.8)
LYMPHOCYTES NFR BLD AUTO: 7 % (ref 15–47)
MCH RBC QN AUTO: 30.2 PG (ref 26–33)
MCHC RBC AUTO-ENTMCNC: 34.2 GM/DL (ref 32.2–35.5)
MCV RBC AUTO: 88 FL (ref 81–99)
MONOCYTES ABSOLUTE: 0.7 THOU/MM3 (ref 0.4–1.3)
MONOCYTES NFR BLD AUTO: 6 % (ref 0–12)
NEUTROPHILS ABSOLUTE: 9.2 THOU/MM3 (ref 1.8–7.7)
NEUTROPHILS NFR BLD AUTO: 86 % (ref 43–75)
PLATELET # BLD AUTO: 261 THOU/MM3 (ref 130–400)
PMV BLD AUTO: 9.2 FL (ref 9.4–12.4)
POTASSIUM BLD-SCNC: 4.2 MEQ/L (ref 3.5–4.9)
PROT SERPL-MCNC: 6.6 G/DL (ref 6.4–8.3)
RBC # BLD AUTO: 4.31 MILL/MM3 (ref 4.2–5.4)
SODIUM BLD-SCNC: 138 MEQ/L (ref 138–146)
TOTAL CO2, WHOLE BLOOD: 24 MEQ/L (ref 23–33)
WBC # BLD AUTO: 10.8 THOU/MM3 (ref 4.8–10.8)

## 2025-08-18 PROCEDURE — 3017F COLORECTAL CA SCREEN DOC REV: CPT | Performed by: INTERNAL MEDICINE

## 2025-08-18 PROCEDURE — 85025 COMPLETE CBC W/AUTO DIFF WBC: CPT

## 2025-08-18 PROCEDURE — 6360000002 HC RX W HCPCS: Performed by: INTERNAL MEDICINE

## 2025-08-18 PROCEDURE — 2500000003 HC RX 250 WO HCPCS: Performed by: INTERNAL MEDICINE

## 2025-08-18 PROCEDURE — G8417 CALC BMI ABV UP PARAM F/U: HCPCS | Performed by: INTERNAL MEDICINE

## 2025-08-18 PROCEDURE — 96375 TX/PRO/DX INJ NEW DRUG ADDON: CPT

## 2025-08-18 PROCEDURE — 2580000003 HC RX 258: Performed by: INTERNAL MEDICINE

## 2025-08-18 PROCEDURE — 80047 BASIC METABLC PNL IONIZED CA: CPT

## 2025-08-18 PROCEDURE — 96413 CHEMO IV INFUSION 1 HR: CPT

## 2025-08-18 PROCEDURE — G8428 CUR MEDS NOT DOCUMENT: HCPCS | Performed by: INTERNAL MEDICINE

## 2025-08-18 PROCEDURE — 99214 OFFICE O/P EST MOD 30 MIN: CPT | Performed by: INTERNAL MEDICINE

## 2025-08-18 PROCEDURE — G9899 SCRN MAM PERF RSLTS DOC: HCPCS | Performed by: INTERNAL MEDICINE

## 2025-08-18 PROCEDURE — 99211 OFF/OP EST MAY X REQ PHY/QHP: CPT

## 2025-08-18 PROCEDURE — 96367 TX/PROPH/DG ADDL SEQ IV INF: CPT

## 2025-08-18 PROCEDURE — 4004F PT TOBACCO SCREEN RCVD TLK: CPT | Performed by: INTERNAL MEDICINE

## 2025-08-18 PROCEDURE — 80076 HEPATIC FUNCTION PANEL: CPT

## 2025-08-18 PROCEDURE — 96417 CHEMO IV INFUS EACH ADDL SEQ: CPT

## 2025-08-18 RX ORDER — ACETAMINOPHEN 325 MG/1
650 TABLET ORAL
OUTPATIENT
Start: 2025-08-18

## 2025-08-18 RX ORDER — EPINEPHRINE 1 MG/ML
0.3 INJECTION, SOLUTION, CONCENTRATE INTRAVENOUS PRN
Status: CANCELLED | OUTPATIENT
Start: 2025-08-18

## 2025-08-18 RX ORDER — SODIUM CHLORIDE 9 MG/ML
5-250 INJECTION, SOLUTION INTRAVENOUS PRN
Status: DISCONTINUED | OUTPATIENT
Start: 2025-08-18 | End: 2025-08-19 | Stop reason: HOSPADM

## 2025-08-18 RX ORDER — ACETAMINOPHEN 325 MG/1
650 TABLET ORAL
Status: CANCELLED | OUTPATIENT
Start: 2025-08-18

## 2025-08-18 RX ORDER — PALONOSETRON 0.05 MG/ML
0.25 INJECTION, SOLUTION INTRAVENOUS ONCE
Status: COMPLETED | OUTPATIENT
Start: 2025-08-18 | End: 2025-08-18

## 2025-08-18 RX ORDER — DIPHENHYDRAMINE HYDROCHLORIDE 50 MG/ML
50 INJECTION, SOLUTION INTRAMUSCULAR; INTRAVENOUS
OUTPATIENT
Start: 2025-08-18

## 2025-08-18 RX ORDER — FAMOTIDINE 10 MG/ML
20 INJECTION, SOLUTION INTRAVENOUS
Status: CANCELLED | OUTPATIENT
Start: 2025-08-18

## 2025-08-18 RX ORDER — HYDROCORTISONE SODIUM SUCCINATE 100 MG/2ML
100 INJECTION INTRAMUSCULAR; INTRAVENOUS
Status: CANCELLED | OUTPATIENT
Start: 2025-08-18

## 2025-08-18 RX ORDER — DIPHENHYDRAMINE HYDROCHLORIDE 50 MG/ML
50 INJECTION, SOLUTION INTRAMUSCULAR; INTRAVENOUS
Status: CANCELLED | OUTPATIENT
Start: 2025-08-18

## 2025-08-18 RX ORDER — EPINEPHRINE 1 MG/ML
0.3 INJECTION, SOLUTION INTRAMUSCULAR; SUBCUTANEOUS PRN
OUTPATIENT
Start: 2025-08-18

## 2025-08-18 RX ORDER — SODIUM CHLORIDE 9 MG/ML
INJECTION, SOLUTION INTRAVENOUS PRN
Status: CANCELLED | OUTPATIENT
Start: 2025-08-18

## 2025-08-18 RX ORDER — ONDANSETRON 2 MG/ML
8 INJECTION INTRAMUSCULAR; INTRAVENOUS
Status: CANCELLED | OUTPATIENT
Start: 2025-08-18

## 2025-08-18 RX ORDER — HYDROCORTISONE SODIUM SUCCINATE 100 MG/2ML
100 INJECTION INTRAMUSCULAR; INTRAVENOUS
OUTPATIENT
Start: 2025-08-18

## 2025-08-18 RX ORDER — PALONOSETRON 0.05 MG/ML
0.25 INJECTION, SOLUTION INTRAVENOUS ONCE
Status: CANCELLED | OUTPATIENT
Start: 2025-08-18 | End: 2025-08-18

## 2025-08-18 RX ORDER — ALBUTEROL SULFATE 90 UG/1
4 INHALANT RESPIRATORY (INHALATION) PRN
OUTPATIENT
Start: 2025-08-18

## 2025-08-18 RX ORDER — SODIUM CHLORIDE 0.9 % (FLUSH) 0.9 %
5-40 SYRINGE (ML) INJECTION PRN
Status: DISCONTINUED | OUTPATIENT
Start: 2025-08-18 | End: 2025-08-19 | Stop reason: HOSPADM

## 2025-08-18 RX ORDER — SODIUM CHLORIDE 9 MG/ML
INJECTION, SOLUTION INTRAVENOUS PRN
OUTPATIENT
Start: 2025-08-18

## 2025-08-18 RX ORDER — SODIUM CHLORIDE 9 MG/ML
5-250 INJECTION, SOLUTION INTRAVENOUS PRN
Status: CANCELLED | OUTPATIENT
Start: 2025-08-18

## 2025-08-18 RX ORDER — SODIUM CHLORIDE 0.9 % (FLUSH) 0.9 %
5-40 SYRINGE (ML) INJECTION PRN
OUTPATIENT
Start: 2025-08-18

## 2025-08-18 RX ORDER — MEPERIDINE HYDROCHLORIDE 50 MG/ML
12.5 INJECTION INTRAMUSCULAR; INTRAVENOUS; SUBCUTANEOUS PRN
Status: CANCELLED | OUTPATIENT
Start: 2025-08-18

## 2025-08-18 RX ORDER — SODIUM CHLORIDE 0.9 % (FLUSH) 0.9 %
5-40 SYRINGE (ML) INJECTION PRN
Status: CANCELLED | OUTPATIENT
Start: 2025-08-18

## 2025-08-18 RX ORDER — HEPARIN 100 UNIT/ML
500 SYRINGE INTRAVENOUS PRN
Status: DISCONTINUED | OUTPATIENT
Start: 2025-08-18 | End: 2025-08-19 | Stop reason: HOSPADM

## 2025-08-18 RX ORDER — ONDANSETRON 2 MG/ML
8 INJECTION INTRAMUSCULAR; INTRAVENOUS
OUTPATIENT
Start: 2025-08-18

## 2025-08-18 RX ORDER — SODIUM CHLORIDE 9 MG/ML
25 INJECTION, SOLUTION INTRAVENOUS PRN
OUTPATIENT
Start: 2025-08-18

## 2025-08-18 RX ORDER — HEPARIN 100 UNIT/ML
500 SYRINGE INTRAVENOUS PRN
OUTPATIENT
Start: 2025-08-18

## 2025-08-18 RX ORDER — ALBUTEROL SULFATE 90 UG/1
4 INHALANT RESPIRATORY (INHALATION) PRN
Status: CANCELLED | OUTPATIENT
Start: 2025-08-18

## 2025-08-18 RX ORDER — HEPARIN SODIUM (PORCINE) LOCK FLUSH IV SOLN 100 UNIT/ML 100 UNIT/ML
500 SOLUTION INTRAVENOUS PRN
Status: CANCELLED | OUTPATIENT
Start: 2025-08-18

## 2025-08-18 RX ADMIN — HEPARIN 500 UNITS: 100 SYRINGE at 14:39

## 2025-08-18 RX ADMIN — DOCETAXEL ANHYDROUS 140 MG: 10 INJECTION, SOLUTION INTRAVENOUS at 12:46

## 2025-08-18 RX ADMIN — SODIUM CHLORIDE, PRESERVATIVE FREE 20 ML: 5 INJECTION INTRAVENOUS at 14:39

## 2025-08-18 RX ADMIN — DEXAMETHASONE SODIUM PHOSPHATE 12 MG: 4 INJECTION, SOLUTION INTRAMUSCULAR; INTRAVENOUS at 12:18

## 2025-08-18 RX ADMIN — CYCLOPHOSPHAMIDE 1080 MG: 200 INJECTION, SOLUTION INTRAVENOUS at 14:02

## 2025-08-18 RX ADMIN — SODIUM CHLORIDE 25 ML/HR: 0.9 INJECTION, SOLUTION INTRAVENOUS at 12:01

## 2025-08-18 RX ADMIN — PALONOSETRON 0.25 MG: 0.05 INJECTION, SOLUTION INTRAVENOUS at 12:41

## 2025-08-18 RX ADMIN — SODIUM CHLORIDE, PRESERVATIVE FREE 30 ML: 5 INJECTION INTRAVENOUS at 11:00

## 2025-08-19 ENCOUNTER — HOSPITAL ENCOUNTER (OUTPATIENT)
Dept: INFUSION THERAPY | Age: 55
Discharge: HOME | End: 2025-08-19
Payer: MEDICAID

## 2025-08-19 ENCOUNTER — OFFICE VISIT (OUTPATIENT)
Dept: FAMILY MEDICINE CLINIC | Age: 55
End: 2025-08-19
Payer: MEDICAID

## 2025-08-19 ENCOUNTER — HOSPITAL ENCOUNTER (OUTPATIENT)
Dept: PHYSICAL THERAPY | Age: 55
Setting detail: THERAPIES SERIES
Discharge: HOME | End: 2025-08-19
Payer: MEDICAID

## 2025-08-19 VITALS
DIASTOLIC BLOOD PRESSURE: 63 MMHG | SYSTOLIC BLOOD PRESSURE: 137 MMHG | WEIGHT: 173.6 LBS | BODY MASS INDEX: 35.04 KG/M2 | OXYGEN SATURATION: 95 % | TEMPERATURE: 98.1 F | HEART RATE: 62 BPM | RESPIRATION RATE: 18 BRPM

## 2025-08-19 VITALS
WEIGHT: 172 LBS | TEMPERATURE: 98.1 F | HEART RATE: 67 BPM | HEIGHT: 59 IN | RESPIRATION RATE: 16 BRPM | SYSTOLIC BLOOD PRESSURE: 128 MMHG | DIASTOLIC BLOOD PRESSURE: 84 MMHG | BODY MASS INDEX: 34.68 KG/M2

## 2025-08-19 DIAGNOSIS — R73.03 PREDIABETES: ICD-10-CM

## 2025-08-19 DIAGNOSIS — C50.412 MALIGNANT NEOPLASM OF UPPER-OUTER QUADRANT OF LEFT BREAST IN FEMALE, ESTROGEN RECEPTOR POSITIVE (HCC): Primary | ICD-10-CM

## 2025-08-19 DIAGNOSIS — R19.5 POSITIVE COLORECTAL CANCER SCREENING USING COLOGUARD TEST: ICD-10-CM

## 2025-08-19 DIAGNOSIS — Z91.89 AT RISK FOR OBSTRUCTIVE SLEEP APNEA: ICD-10-CM

## 2025-08-19 DIAGNOSIS — Z17.0 MALIGNANT NEOPLASM OF UPPER-OUTER QUADRANT OF LEFT BREAST IN FEMALE, ESTROGEN RECEPTOR POSITIVE (HCC): Primary | ICD-10-CM

## 2025-08-19 DIAGNOSIS — R25.2 CRAMPING OF HANDS: ICD-10-CM

## 2025-08-19 DIAGNOSIS — R06.83 SNORING: ICD-10-CM

## 2025-08-19 DIAGNOSIS — C50.912 INVASIVE DUCTAL CARCINOMA OF BREAST, FEMALE, LEFT (HCC): Primary | ICD-10-CM

## 2025-08-19 PROCEDURE — G9899 SCRN MAM PERF RSLTS DOC: HCPCS

## 2025-08-19 PROCEDURE — 6360000002 HC RX W HCPCS: Performed by: INTERNAL MEDICINE

## 2025-08-19 PROCEDURE — 4004F PT TOBACCO SCREEN RCVD TLK: CPT

## 2025-08-19 PROCEDURE — 3017F COLORECTAL CA SCREEN DOC REV: CPT

## 2025-08-19 PROCEDURE — 97110 THERAPEUTIC EXERCISES: CPT

## 2025-08-19 PROCEDURE — G8417 CALC BMI ABV UP PARAM F/U: HCPCS

## 2025-08-19 PROCEDURE — 99214 OFFICE O/P EST MOD 30 MIN: CPT

## 2025-08-19 PROCEDURE — 96372 THER/PROPH/DIAG INJ SC/IM: CPT

## 2025-08-19 PROCEDURE — G8427 DOCREV CUR MEDS BY ELIG CLIN: HCPCS

## 2025-08-19 RX ADMIN — PEGFILGRASTIM-JMDB 6 MG: 6 INJECTION SUBCUTANEOUS at 11:52

## 2025-08-20 ASSESSMENT — ENCOUNTER SYMPTOMS
FACIAL SWELLING: 0
CONSTIPATION: 0
NAUSEA: 0
SINUS PAIN: 0
SINUS PRESSURE: 0
DIARRHEA: 0
ABDOMINAL PAIN: 0
VOMITING: 0

## 2025-08-21 ENCOUNTER — TELEPHONE (OUTPATIENT)
Dept: ONCOLOGY | Age: 55
End: 2025-08-21

## 2025-08-29 ENCOUNTER — SOCIAL WORK (OUTPATIENT)
Dept: INFUSION THERAPY | Age: 55
End: 2025-08-29

## 2025-09-05 ENCOUNTER — HOSPITAL ENCOUNTER (OUTPATIENT)
Dept: OTHER | Age: 55
Discharge: HOME | End: 2025-09-05
Payer: MEDICAID

## 2025-09-05 DIAGNOSIS — R25.2 CRAMPING OF HANDS: ICD-10-CM

## 2025-09-05 DIAGNOSIS — R73.03 PREDIABETES: ICD-10-CM

## 2025-09-05 LAB
ALBUMIN SERPL BCG-MCNC: 3.7 G/DL (ref 3.4–4.9)
ALP SERPL-CCNC: 81 U/L (ref 38–126)
ALT SERPL W/O P-5'-P-CCNC: 20 U/L (ref 10–35)
ANION GAP SERPL CALC-SCNC: 11 MEQ/L (ref 8–16)
AST SERPL-CCNC: 21 U/L (ref 10–35)
BILIRUB SERPL-MCNC: 0.5 MG/DL (ref 0.3–1.2)
BUN SERPL-MCNC: 9 MG/DL (ref 8–23)
CALCIUM SERPL-MCNC: 9.5 MG/DL (ref 8.5–10.5)
CHLORIDE SERPL-SCNC: 104 MEQ/L (ref 98–111)
CO2 SERPL-SCNC: 26 MEQ/L (ref 22–29)
CREAT SERPL-MCNC: 0.8 MG/DL (ref 0.5–0.9)
DEPRECATED MEAN GLUCOSE BLD GHB EST-ACNC: 156 MG/DL (ref 70–126)
GFR SERPL CREATININE-BSD FRML MDRD: 87 ML/MIN/1.73M2
GLUCOSE SERPL-MCNC: 121 MG/DL (ref 74–109)
HBA1C MFR BLD HPLC: 7.2 % (ref 4–6)
MAGNESIUM SERPL-MCNC: 2.1 MG/DL (ref 1.6–2.6)
POTASSIUM SERPL-SCNC: 4.8 MEQ/L (ref 3.5–5.2)
PROT SERPL-MCNC: 6.7 G/DL (ref 6.4–8.3)
SODIUM SERPL-SCNC: 141 MEQ/L (ref 135–145)
TSH SERPL DL<=0.05 MIU/L-ACNC: 2.24 UIU/ML (ref 0.27–4.2)

## 2025-09-05 PROCEDURE — 80053 COMPREHEN METABOLIC PANEL: CPT

## 2025-09-05 PROCEDURE — 36415 COLL VENOUS BLD VENIPUNCTURE: CPT

## 2025-09-05 PROCEDURE — 83036 HEMOGLOBIN GLYCOSYLATED A1C: CPT

## 2025-09-05 PROCEDURE — 84443 ASSAY THYROID STIM HORMONE: CPT

## 2025-09-05 PROCEDURE — 83735 ASSAY OF MAGNESIUM: CPT

## 2025-09-05 RX ORDER — SENNOSIDES 8.6 MG
325 CAPSULE ORAL DAILY
Qty: 90 TABLET | Refills: 3 | Status: SHIPPED | OUTPATIENT
Start: 2025-09-05

## (undated) DEVICE — Device: Brand: SUCTION TIP

## (undated) DEVICE — BREAST HERNIA: Brand: MEDLINE INDUSTRIES, INC.

## (undated) DEVICE — ADHESIVE SKIN CLOSURE WND 8.661X1.5 IN 22 CM LIQUIBAND SECUR

## (undated) DEVICE — SHEET,DRAPE,3/4,53X77,STERILE: Brand: MEDLINE

## (undated) DEVICE — DECANTER FLD 9IN ST BG FOR ASEP TRNSF OF FLD

## (undated) DEVICE — HYPODERMIC SAFETY NEEDLE: Brand: MAGELLAN

## (undated) DEVICE — THORACIC CATHETER,STRAIGHT: Brand: ARGYLE

## (undated) DEVICE — KIT VEN DRNGE VAC ACCSRY PERF VAVD STOCK W/ SPEC TRAP

## (undated) DEVICE — PROVE COVER: Brand: UNBRANDED

## (undated) DEVICE — SPECIMEN ORIENTATION CHARMS, SIX DISTINCTLY SHAPED STERILE 10MM CHARMS: Brand: MARGINMAP

## (undated) DEVICE — KIT BLWR MISTER 5P 15L W/ TBNG SET IRRIG MIST TO IMPROVE

## (undated) DEVICE — SUTURE VICRYL + SZ 4-0 L27IN ABSRB WHT FS-2 3/8 CIR REV CUT VCP422H

## (undated) DEVICE — CANNULA PVC RCSP 15FR SLD STYL W/ HNDL OVERALL LEN 11 IN

## (undated) DEVICE — SYRINGE MED 10ML LUERLOCK TIP W/O SFTY DISP

## (undated) DEVICE — OPEN HRT CDS LF

## (undated) DEVICE — CLIP LIG M TI 6 SIL HNDL FOR OPN AND ENDOSCP SGL APPL

## (undated) DEVICE — SYSTEM ENDOSCP VES HARV W/ TOOL CANN SEAL SHT PRT BLNT TIP

## (undated) DEVICE — CANNULA PERF 15FR L12.5IN RG STPCOCK WIREWOUND BODY

## (undated) DEVICE — SUTURE VICRYL + SZ 3-0 L27IN ABSRB UD L26MM SH 1/2 CIR VCP416H

## (undated) DEVICE — GLOVE ORANGE PI 7   MSG9070

## (undated) DEVICE — TUBING INSUFFLATION SMK EVAC HI FLO SET PNEUMOCLEAR

## (undated) DEVICE — CANNULA PERF L5.5IN DIA9FR AORT ROOT AG STD TIP W/ VENT LN

## (undated) DEVICE — SUTURE VCRL + SZ 3-0 L27IN ABSRB WHT CT-1 1/2 CIR VCP258H

## (undated) DEVICE — LIQUIBAND RAPID ADHESIVE 36/CS 0.8ML: Brand: MEDLINE

## (undated) DEVICE — APPLIER LIG CLP M L11IN TI STR RNG HNDL FOR 20 CLP DISP

## (undated) DEVICE — SOLUTION IRRIG 1000ML 09% SOD CHL USP PIC PLAS CONTAINER

## (undated) DEVICE — SUTURE MONOCRYL SZ 4-0 L27IN ABSRB UD L19MM PS-2 1/2 CIR PRIM Y426H

## (undated) DEVICE — KIT ART LN 20GA L12CM FEP RADPQ 0.025X13.75IN SPR GWIRE

## (undated) DEVICE — BAG,BANDED,W/RUBBERBAND,STERILE,30X36: Brand: MEDLINE

## (undated) DEVICE — DRAIN SURG SGL COLL PT TB FOR ATS BG OASIS

## (undated) DEVICE — CLIP LIG SM TI 6 BLU HNDL FOR OPN AND ENDOSCP SGL APPL

## (undated) DEVICE — EZ GLIDE AORTIC CANNULA: Brand: EDWARDS LIFESCIENCES EZ GLIDE AORTIC CANNULA

## (undated) DEVICE — SUTURE VCRL + SZ 0 L36IN ABSRB VLT L36MM CT-1 1/2 CIR VCP346H

## (undated) DEVICE — SUTURE VICRYL + SZ 2-0 L27IN ABSRB WHT SH 1/2 CIR TAPERCUT VCP417H

## (undated) DEVICE — ATTACHMENT POSER 360DEG ARTC STBL FOR POS THE APEX OF THE

## (undated) DEVICE — PENCIL SMK EVAC ALL IN 1 DSGN ENH VISIBILITY IMPROVED AIR

## (undated) DEVICE — TOWEL,OR,DSP,ST,BLUE,STD,4/PK,20PK/CS: Brand: MEDLINE

## (undated) DEVICE — THORACIC CATHETER,RIGHT ANGLE: Brand: ARGYLE

## (undated) DEVICE — CONTAINER,SPECIMEN,PNEU TUBE,4OZ,OR STRL: Brand: MEDLINE

## (undated) DEVICE — GAUZE SPONGES,USP TYPE VII GAUZE, 12 PLY: Brand: CURITY

## (undated) DEVICE — DUAL STAGE VENOUS RETURN CANNULA: Brand: EDWARDS LIFESCIENCES DUAL STAGE VENOUS DRAINAGE CANNULA

## (undated) DEVICE — SURGICAL PROCEDURE PACK OXGNTR ST MARYS

## (undated) DEVICE — APPLIER CLP L9.38IN M LIG TI DISP STR RNG HNDL LIGACLP

## (undated) DEVICE — NEEDLE SPNL 22GA L3.5IN BLK HUB S STL REG WALL FIT STYL W/

## (undated) DEVICE — SUTURE PROL 3-0 36IN NONABSORB BLU 26MM SH 1/2 CIR P8522H

## (undated) DEVICE — NEEDLE SYR 18GA L1.5IN RED PLAS HUB S STL BLNT FILL W/O

## (undated) DEVICE — DRESSING GERM 6-12FR DIA1IN CNTR H 4MM ANTIMIC PROTCT DISK

## (undated) DEVICE — GAUZE,SPONGE,8"X4",12PLY,XRAY,STRL,LF: Brand: MEDLINE

## (undated) DEVICE — CONNECTOR PERF 3/8X3/8X3/8IN EQL WYE

## (undated) DEVICE — APPLIER CLP L9.375IN APER 2.1MM CLS L3.8MM 20 SM TI CLP

## (undated) DEVICE — PACK SURGICAL PROC CUSTOM TISSUE PERFUSION SYSTEM SPY ELITE

## (undated) DEVICE — SET AUTOTRNS C175ML BOWL BTM OUTLT RESERVOIRXTRA

## (undated) DEVICE — OPEN HEART SUPPLEMENT: Brand: MEDLINE INDUSTRIES, INC.

## (undated) DEVICE — CHLORAPREP 26ML CLEAR

## (undated) DEVICE — GLOVE ORANGE PI 7 1/2   MSG9075